# Patient Record
Sex: MALE | Race: BLACK OR AFRICAN AMERICAN | NOT HISPANIC OR LATINO
[De-identification: names, ages, dates, MRNs, and addresses within clinical notes are randomized per-mention and may not be internally consistent; named-entity substitution may affect disease eponyms.]

---

## 2017-01-03 ENCOUNTER — RX RENEWAL (OUTPATIENT)
Age: 77
End: 2017-01-03

## 2017-01-19 ENCOUNTER — APPOINTMENT (OUTPATIENT)
Dept: UROLOGY | Facility: CLINIC | Age: 77
End: 2017-01-19

## 2017-01-19 DIAGNOSIS — N28.1 CYST OF KIDNEY, ACQUIRED: ICD-10-CM

## 2017-01-19 LAB
APPEARANCE: CLEAR
BACTERIA: NEGATIVE
BILIRUBIN URINE: NEGATIVE
BLOOD URINE: NEGATIVE
COLOR: YELLOW
GLUCOSE QUALITATIVE U: NORMAL MG/DL
HYALINE CASTS: 2 /LPF
KETONES URINE: NEGATIVE
LEUKOCYTE ESTERASE URINE: NEGATIVE
MICROSCOPIC-UA: NORMAL
NITRITE URINE: NEGATIVE
PH URINE: 5.5
PROTEIN URINE: 100 MG/DL
RED BLOOD CELLS URINE: 0 /HPF
SPECIFIC GRAVITY URINE: 1.01
SQUAMOUS EPITHELIAL CELLS: 0 /HPF
UROBILINOGEN URINE: NORMAL MG/DL
WHITE BLOOD CELLS URINE: 0 /HPF

## 2017-01-20 LAB
PSA FREE FLD-MCNC: 53.4 %
PSA FREE SERPL-MCNC: 2.54 NG/ML
PSA SERPL-MCNC: 4.76 NG/ML

## 2017-01-24 LAB — CORE LAB FLUID CYTOLOGY: NORMAL

## 2017-02-03 ENCOUNTER — APPOINTMENT (OUTPATIENT)
Dept: NEPHROLOGY | Facility: CLINIC | Age: 77
End: 2017-02-03

## 2017-02-03 ENCOUNTER — MEDICATION RENEWAL (OUTPATIENT)
Age: 77
End: 2017-02-03

## 2017-02-03 VITALS
BODY MASS INDEX: 25.92 KG/M2 | SYSTOLIC BLOOD PRESSURE: 161 MMHG | OXYGEN SATURATION: 98 % | HEIGHT: 69 IN | DIASTOLIC BLOOD PRESSURE: 84 MMHG | WEIGHT: 175 LBS | HEART RATE: 66 BPM

## 2017-02-03 VITALS — DIASTOLIC BLOOD PRESSURE: 80 MMHG | SYSTOLIC BLOOD PRESSURE: 150 MMHG | HEART RATE: 64 BPM

## 2017-03-03 ENCOUNTER — MEDICATION RENEWAL (OUTPATIENT)
Age: 77
End: 2017-03-03

## 2017-03-06 ENCOUNTER — MEDICATION RENEWAL (OUTPATIENT)
Age: 77
End: 2017-03-06

## 2017-03-21 ENCOUNTER — APPOINTMENT (OUTPATIENT)
Dept: INTERNAL MEDICINE | Facility: CLINIC | Age: 77
End: 2017-03-21

## 2017-03-21 ENCOUNTER — NON-APPOINTMENT (OUTPATIENT)
Age: 77
End: 2017-03-21

## 2017-03-21 ENCOUNTER — LABORATORY RESULT (OUTPATIENT)
Age: 77
End: 2017-03-21

## 2017-03-21 VITALS
BODY MASS INDEX: 26.07 KG/M2 | DIASTOLIC BLOOD PRESSURE: 74 MMHG | RESPIRATION RATE: 16 BRPM | OXYGEN SATURATION: 98 % | HEART RATE: 60 BPM | TEMPERATURE: 98 F | WEIGHT: 176 LBS | SYSTOLIC BLOOD PRESSURE: 130 MMHG | HEIGHT: 69 IN

## 2017-03-21 DIAGNOSIS — Z23 ENCOUNTER FOR IMMUNIZATION: ICD-10-CM

## 2017-03-21 DIAGNOSIS — R97.20 ELEVATED PROSTATE, SPECIFIC ANTIGEN [PSA]: ICD-10-CM

## 2017-03-24 LAB
25(OH)D3 SERPL-MCNC: 41 NG/ML
AFP-TM SERPL-MCNC: 1.9 NG/ML
ALBUMIN MFR SERPL ELPH: 56 %
ALBUMIN SERPL ELPH-MCNC: 4.6 G/DL
ALBUMIN SERPL-MCNC: 4.6 G/DL
ALBUMIN/GLOB SERPL: 1.2 RATIO
ALP BLD-CCNC: 50 U/L
ALPHA1 GLOB MFR SERPL ELPH: 3.4 %
ALPHA1 GLOB SERPL ELPH-MCNC: 0.3 G/DL
ALPHA2 GLOB MFR SERPL ELPH: 13.3 %
ALPHA2 GLOB SERPL ELPH-MCNC: 1.1 G/DL
ALT SERPL-CCNC: 18 U/L
ANION GAP SERPL CALC-SCNC: 13 MMOL/L
APPEARANCE: CLEAR
AST SERPL-CCNC: 25 U/L
B-GLOBULIN MFR SERPL ELPH: 10.6 %
B-GLOBULIN SERPL ELPH-MCNC: 0.9 G/DL
BACTERIA: NEGATIVE
BASOPHILS # BLD AUTO: 0.02 K/UL
BASOPHILS NFR BLD AUTO: 0.3 %
BILIRUB SERPL-MCNC: 0.5 MG/DL
BILIRUBIN URINE: NEGATIVE
BLOOD URINE: NEGATIVE
BUN SERPL-MCNC: 27 MG/DL
CALCIUM SERPL-MCNC: 10.1 MG/DL
CALCIUM SERPL-MCNC: 10.1 MG/DL
CHLORIDE SERPL-SCNC: 102 MMOL/L
CHOLEST SERPL-MCNC: 136 MG/DL
CHOLEST/HDLC SERPL: 2.6 RATIO
CO2 SERPL-SCNC: 25 MMOL/L
COLOR: YELLOW
CREAT SERPL-MCNC: 1.74 MG/DL
CREAT SPEC-SCNC: 144 MG/DL
EOSINOPHIL # BLD AUTO: 0.09 K/UL
EOSINOPHIL NFR BLD AUTO: 1.4 %
GAMMA GLOB FLD ELPH-MCNC: 1.4 G/DL
GAMMA GLOB MFR SERPL ELPH: 16.7 %
GLUCOSE QUALITATIVE U: NORMAL MG/DL
GLUCOSE SERPL-MCNC: 112 MG/DL
HBA1C MFR BLD HPLC: 6.3 %
HCT VFR BLD CALC: 44.8 %
HDLC SERPL-MCNC: 53 MG/DL
HGB BLD-MCNC: 14 G/DL
HYALINE CASTS: 2 /LPF
IMM GRANULOCYTES NFR BLD AUTO: 0.2 %
INTERPRETATION SERPL IEP-IMP: NORMAL
IRON SERPL-MCNC: 87 UG/DL
KETONES URINE: NEGATIVE
LDLC SERPL CALC-MCNC: 66 MG/DL
LEUKOCYTE ESTERASE URINE: NEGATIVE
LYMPHOCYTES # BLD AUTO: 1.4 K/UL
LYMPHOCYTES NFR BLD AUTO: 21.8 %
M PROTEIN MFR SERPL ELPH: 4.6 %
MAN DIFF?: NORMAL
MCHC RBC-ENTMCNC: 28.8 PG
MCHC RBC-ENTMCNC: 31.3 GM/DL
MCV RBC AUTO: 92.2 FL
MICROALBUMIN 24H UR DL<=1MG/L-MCNC: 196.3 MG/DL
MICROALBUMIN/CREAT 24H UR-RTO: 1366 UG/MG
MICROSCOPIC-UA: NORMAL
MONOCLON BAND OBS SERPL: 0.4 G/DL
MONOCYTES # BLD AUTO: 0.34 K/UL
MONOCYTES NFR BLD AUTO: 5.3 %
NEUTROPHILS # BLD AUTO: 4.57 K/UL
NEUTROPHILS NFR BLD AUTO: 71 %
NITRITE URINE: NEGATIVE
PARATHYROID HORMONE INTACT: 28 PG/ML
PH URINE: 5.5
PLATELET # BLD AUTO: 154 K/UL
POTASSIUM SERPL-SCNC: 5.1 MMOL/L
PROT SERPL-MCNC: 8.3 G/DL
PROTEIN URINE: 300 MG/DL
PSA SERPL-MCNC: 4.91 NG/ML
RBC # BLD: 4.86 M/UL
RBC # FLD: 14.6 %
RED BLOOD CELLS URINE: 1 /HPF
SODIUM SERPL-SCNC: 140 MMOL/L
SPECIFIC GRAVITY URINE: 1.02
SQUAMOUS EPITHELIAL CELLS: 0 /HPF
TRIGL SERPL-MCNC: 87 MG/DL
TSH SERPL-ACNC: 2.39 UIU/ML
URATE SERPL-MCNC: 8.2 MG/DL
UROBILINOGEN URINE: NORMAL MG/DL
WBC # FLD AUTO: 6.43 K/UL
WHITE BLOOD CELLS URINE: 1 /HPF

## 2017-03-30 ENCOUNTER — OUTPATIENT (OUTPATIENT)
Dept: OUTPATIENT SERVICES | Facility: HOSPITAL | Age: 77
LOS: 1 days | End: 2017-03-30
Payer: MEDICARE

## 2017-03-30 ENCOUNTER — APPOINTMENT (OUTPATIENT)
Dept: CT IMAGING | Facility: IMAGING CENTER | Age: 77
End: 2017-03-30

## 2017-03-30 ENCOUNTER — MEDICATION RENEWAL (OUTPATIENT)
Age: 77
End: 2017-03-30

## 2017-03-30 DIAGNOSIS — N40.1 BENIGN PROSTATIC HYPERPLASIA WITH LOWER URINARY TRACT SYMPTOMS: ICD-10-CM

## 2017-03-30 DIAGNOSIS — Z98.89 OTHER SPECIFIED POSTPROCEDURAL STATES: Chronic | ICD-10-CM

## 2017-03-30 PROCEDURE — 74176 CT ABD & PELVIS W/O CONTRAST: CPT

## 2017-04-08 LAB — HEMOCCULT STL QL IA: NEGATIVE

## 2017-05-23 ENCOUNTER — MEDICATION RENEWAL (OUTPATIENT)
Age: 77
End: 2017-05-23

## 2017-07-20 ENCOUNTER — MEDICATION RENEWAL (OUTPATIENT)
Age: 77
End: 2017-07-20

## 2017-07-24 ENCOUNTER — LABORATORY RESULT (OUTPATIENT)
Age: 77
End: 2017-07-24

## 2017-07-25 ENCOUNTER — APPOINTMENT (OUTPATIENT)
Dept: INTERNAL MEDICINE | Facility: CLINIC | Age: 77
End: 2017-07-25

## 2017-07-25 VITALS
OXYGEN SATURATION: 98 % | HEIGHT: 69 IN | WEIGHT: 175 LBS | SYSTOLIC BLOOD PRESSURE: 120 MMHG | DIASTOLIC BLOOD PRESSURE: 70 MMHG | TEMPERATURE: 98.1 F | BODY MASS INDEX: 25.92 KG/M2 | HEART RATE: 57 BPM

## 2017-07-28 LAB
25(OH)D3 SERPL-MCNC: 53.2 NG/ML
ALBUMIN MFR SERPL ELPH: 56.7 %
ALBUMIN SERPL ELPH-MCNC: 4.7 G/DL
ALBUMIN SERPL-MCNC: 4.6 G/DL
ALBUMIN/GLOB SERPL: 1.3 RATIO
ALP BLD-CCNC: 53 U/L
ALPHA1 GLOB MFR SERPL ELPH: 3.3 %
ALPHA1 GLOB SERPL ELPH-MCNC: 0.3 G/DL
ALPHA2 GLOB MFR SERPL ELPH: 12.3 %
ALPHA2 GLOB SERPL ELPH-MCNC: 1 G/DL
ALT SERPL-CCNC: 14 U/L
ANION GAP SERPL CALC-SCNC: 16 MMOL/L
APPEARANCE: CLEAR
AST SERPL-CCNC: 22 U/L
B-GLOBULIN MFR SERPL ELPH: 10.4 %
B-GLOBULIN SERPL ELPH-MCNC: 0.9 G/DL
BACTERIA: NEGATIVE
BASOPHILS # BLD AUTO: 0.03 K/UL
BASOPHILS NFR BLD AUTO: 0.6 %
BILIRUB SERPL-MCNC: 0.4 MG/DL
BILIRUBIN URINE: NEGATIVE
BLOOD URINE: NEGATIVE
BUN SERPL-MCNC: 24 MG/DL
CALCIUM SERPL-MCNC: 10.7 MG/DL
CHLORIDE SERPL-SCNC: 103 MMOL/L
CHOLEST SERPL-MCNC: 143 MG/DL
CHOLEST/HDLC SERPL: 3 RATIO
CO2 SERPL-SCNC: 23 MMOL/L
COLOR: YELLOW
CREAT SERPL-MCNC: 1.87 MG/DL
CREAT SPEC-SCNC: 137 MG/DL
EOSINOPHIL # BLD AUTO: 0.09 K/UL
EOSINOPHIL NFR BLD AUTO: 1.8 %
GAMMA GLOB FLD ELPH-MCNC: 1.4 G/DL
GAMMA GLOB MFR SERPL ELPH: 17.3 %
GLUCOSE QUALITATIVE U: NORMAL MG/DL
GLUCOSE SERPL-MCNC: 108 MG/DL
HBA1C MFR BLD HPLC: 6.2 %
HCT VFR BLD CALC: 44.8 %
HDLC SERPL-MCNC: 48 MG/DL
HGB BLD-MCNC: 13.8 G/DL
HYALINE CASTS: 0 /LPF
IMM GRANULOCYTES NFR BLD AUTO: 0.2 %
INTERPRETATION SERPL IEP-IMP: NORMAL
KETONES URINE: NEGATIVE
LDLC SERPL CALC-MCNC: 78 MG/DL
LEUKOCYTE ESTERASE URINE: NEGATIVE
LYMPHOCYTES # BLD AUTO: 0.99 K/UL
LYMPHOCYTES NFR BLD AUTO: 20.2 %
M PROTEIN MFR SERPL ELPH: 4.1 %
MAN DIFF?: NORMAL
MCHC RBC-ENTMCNC: 28 PG
MCHC RBC-ENTMCNC: 30.8 GM/DL
MCV RBC AUTO: 91.1 FL
MICROALBUMIN 24H UR DL<=1MG/L-MCNC: 220.8 MG/DL
MICROALBUMIN/CREAT 24H UR-RTO: 1612 MG/G
MICROSCOPIC-UA: NORMAL
MONOCLON BAND OBS SERPL: 0.3 G/DL
MONOCYTES # BLD AUTO: 0.4 K/UL
MONOCYTES NFR BLD AUTO: 8.2 %
NEUTROPHILS # BLD AUTO: 3.38 K/UL
NEUTROPHILS NFR BLD AUTO: 69 %
NITRITE URINE: NEGATIVE
PH URINE: 5.5
PLATELET # BLD AUTO: 159 K/UL
POTASSIUM SERPL-SCNC: 4.4 MMOL/L
PROT SERPL-MCNC: 8.2 G/DL
PROTEIN URINE: 300 MG/DL
RBC # BLD: 4.92 M/UL
RBC # FLD: 15 %
RED BLOOD CELLS URINE: 2 /HPF
SODIUM SERPL-SCNC: 142 MMOL/L
SPECIFIC GRAVITY URINE: 1.02
SQUAMOUS EPITHELIAL CELLS: 0 /HPF
TRIGL SERPL-MCNC: 85 MG/DL
TSH SERPL-ACNC: 2.33 UIU/ML
URATE SERPL-MCNC: 7.6 MG/DL
UROBILINOGEN URINE: NORMAL MG/DL
WBC # FLD AUTO: 4.9 K/UL
WHITE BLOOD CELLS URINE: 1 /HPF

## 2017-07-31 ENCOUNTER — MEDICATION RENEWAL (OUTPATIENT)
Age: 77
End: 2017-07-31

## 2017-07-31 ENCOUNTER — APPOINTMENT (OUTPATIENT)
Dept: CARDIOLOGY | Facility: CLINIC | Age: 77
End: 2017-07-31
Payer: MEDICARE

## 2017-07-31 ENCOUNTER — NON-APPOINTMENT (OUTPATIENT)
Age: 77
End: 2017-07-31

## 2017-07-31 VITALS
BODY MASS INDEX: 25.62 KG/M2 | DIASTOLIC BLOOD PRESSURE: 80 MMHG | WEIGHT: 173 LBS | SYSTOLIC BLOOD PRESSURE: 136 MMHG | HEART RATE: 70 BPM | HEIGHT: 69 IN | OXYGEN SATURATION: 96 %

## 2017-07-31 PROCEDURE — 93000 ELECTROCARDIOGRAM COMPLETE: CPT | Mod: 59

## 2017-07-31 PROCEDURE — 99214 OFFICE O/P EST MOD 30 MIN: CPT

## 2017-07-31 PROCEDURE — 93224 XTRNL ECG REC UP TO 48 HRS: CPT

## 2017-08-02 ENCOUNTER — NON-APPOINTMENT (OUTPATIENT)
Age: 77
End: 2017-08-02

## 2017-08-08 ENCOUNTER — NON-APPOINTMENT (OUTPATIENT)
Age: 77
End: 2017-08-08

## 2017-08-08 ENCOUNTER — MEDICATION RENEWAL (OUTPATIENT)
Age: 77
End: 2017-08-08

## 2017-08-10 ENCOUNTER — APPOINTMENT (OUTPATIENT)
Dept: NEPHROLOGY | Facility: CLINIC | Age: 77
End: 2017-08-10
Payer: MEDICARE

## 2017-08-10 VITALS
DIASTOLIC BLOOD PRESSURE: 74 MMHG | HEART RATE: 60 BPM | OXYGEN SATURATION: 95 % | SYSTOLIC BLOOD PRESSURE: 128 MMHG | HEIGHT: 69 IN | WEIGHT: 175 LBS | BODY MASS INDEX: 25.92 KG/M2

## 2017-08-10 PROCEDURE — 99214 OFFICE O/P EST MOD 30 MIN: CPT

## 2017-08-11 ENCOUNTER — APPOINTMENT (OUTPATIENT)
Dept: CARDIOLOGY | Facility: CLINIC | Age: 77
End: 2017-08-11
Payer: MEDICARE

## 2017-08-11 PROCEDURE — 93306 TTE W/DOPPLER COMPLETE: CPT

## 2017-09-26 ENCOUNTER — MEDICATION RENEWAL (OUTPATIENT)
Age: 77
End: 2017-09-26

## 2017-11-08 ENCOUNTER — MEDICATION RENEWAL (OUTPATIENT)
Age: 77
End: 2017-11-08

## 2017-11-08 ENCOUNTER — APPOINTMENT (OUTPATIENT)
Dept: INTERNAL MEDICINE | Facility: CLINIC | Age: 77
End: 2017-11-08
Payer: MEDICARE

## 2017-11-08 VITALS
HEIGHT: 69 IN | DIASTOLIC BLOOD PRESSURE: 70 MMHG | SYSTOLIC BLOOD PRESSURE: 130 MMHG | WEIGHT: 177 LBS | HEART RATE: 61 BPM | BODY MASS INDEX: 26.22 KG/M2 | OXYGEN SATURATION: 98 % | TEMPERATURE: 98.2 F

## 2017-11-08 DIAGNOSIS — Z23 ENCOUNTER FOR IMMUNIZATION: ICD-10-CM

## 2017-11-08 PROCEDURE — G0008: CPT

## 2017-11-08 PROCEDURE — 90662 IIV NO PRSV INCREASED AG IM: CPT

## 2017-11-08 PROCEDURE — 36415 COLL VENOUS BLD VENIPUNCTURE: CPT

## 2017-11-08 PROCEDURE — 99214 OFFICE O/P EST MOD 30 MIN: CPT | Mod: 25

## 2017-11-10 LAB
25(OH)D3 SERPL-MCNC: 35.6 NG/ML
ALBUMIN SERPL ELPH-MCNC: 4.7 G/DL
ALP BLD-CCNC: 52 U/L
ALT SERPL-CCNC: 15 U/L
ANION GAP SERPL CALC-SCNC: 12 MMOL/L
APPEARANCE: CLEAR
AST SERPL-CCNC: 28 U/L
BACTERIA: NEGATIVE
BASOPHILS # BLD AUTO: 0.04 K/UL
BASOPHILS NFR BLD AUTO: 0.8 %
BILIRUB SERPL-MCNC: 0.3 MG/DL
BILIRUBIN URINE: NEGATIVE
BLOOD URINE: NEGATIVE
BUN SERPL-MCNC: 27 MG/DL
CALCIUM SERPL-MCNC: 10.2 MG/DL
CALCIUM SERPL-MCNC: 10.2 MG/DL
CHLORIDE SERPL-SCNC: 103 MMOL/L
CHOLEST SERPL-MCNC: 127 MG/DL
CHOLEST/HDLC SERPL: 2.8 RATIO
CO2 SERPL-SCNC: 25 MMOL/L
COLOR: YELLOW
CREAT SERPL-MCNC: 1.97 MG/DL
CREAT SPEC-SCNC: 160 MG/DL
EOSINOPHIL # BLD AUTO: 0.1 K/UL
EOSINOPHIL NFR BLD AUTO: 2 %
GLUCOSE QUALITATIVE U: NEGATIVE MG/DL
GLUCOSE SERPL-MCNC: 84 MG/DL
HBA1C MFR BLD HPLC: 6.1 %
HCT VFR BLD CALC: 42.2 %
HDLC SERPL-MCNC: 45 MG/DL
HGB BLD-MCNC: 13.3 G/DL
HYALINE CASTS: 1 /LPF
IMM GRANULOCYTES NFR BLD AUTO: 0.2 %
IRON SERPL-MCNC: 93 UG/DL
KETONES URINE: NEGATIVE
LDLC SERPL CALC-MCNC: 64 MG/DL
LEUKOCYTE ESTERASE URINE: NEGATIVE
LYMPHOCYTES # BLD AUTO: 1.29 K/UL
LYMPHOCYTES NFR BLD AUTO: 26.4 %
MAN DIFF?: NORMAL
MCHC RBC-ENTMCNC: 28.7 PG
MCHC RBC-ENTMCNC: 31.5 GM/DL
MCV RBC AUTO: 90.9 FL
MICROALBUMIN 24H UR DL<=1MG/L-MCNC: 218.2 MG/DL
MICROALBUMIN/CREAT 24H UR-RTO: 1364 MG/G
MICROSCOPIC-UA: NORMAL
MONOCYTES # BLD AUTO: 0.41 K/UL
MONOCYTES NFR BLD AUTO: 8.4 %
NEUTROPHILS # BLD AUTO: 3.03 K/UL
NEUTROPHILS NFR BLD AUTO: 62.2 %
NITRITE URINE: NEGATIVE
PARATHYROID HORMONE INTACT: 43 PG/ML
PH URINE: 5.5
PLATELET # BLD AUTO: 151 K/UL
POTASSIUM SERPL-SCNC: 4.5 MMOL/L
PROT SERPL-MCNC: 8.1 G/DL
PROTEIN URINE: 300 MG/DL
RBC # BLD: 4.64 M/UL
RBC # FLD: 14.9 %
RED BLOOD CELLS URINE: 1 /HPF
SODIUM SERPL-SCNC: 140 MMOL/L
SPECIFIC GRAVITY URINE: 1.02
SQUAMOUS EPITHELIAL CELLS: 1 /HPF
TRIGL SERPL-MCNC: 91 MG/DL
TSH SERPL-ACNC: 2.5 UIU/ML
UROBILINOGEN URINE: NEGATIVE MG/DL
WBC # FLD AUTO: 4.88 K/UL
WHITE BLOOD CELLS URINE: 0 /HPF

## 2018-01-16 ENCOUNTER — MEDICATION RENEWAL (OUTPATIENT)
Age: 78
End: 2018-01-16

## 2018-02-02 ENCOUNTER — APPOINTMENT (OUTPATIENT)
Dept: NEPHROLOGY | Facility: CLINIC | Age: 78
End: 2018-02-02
Payer: MEDICARE

## 2018-02-02 VITALS
HEART RATE: 70 BPM | DIASTOLIC BLOOD PRESSURE: 84 MMHG | BODY MASS INDEX: 28.52 KG/M2 | OXYGEN SATURATION: 98 % | SYSTOLIC BLOOD PRESSURE: 165 MMHG | WEIGHT: 177.47 LBS | HEIGHT: 66 IN

## 2018-02-02 PROCEDURE — 99214 OFFICE O/P EST MOD 30 MIN: CPT

## 2018-02-02 RX ORDER — BIMATOPROST 0.1 MG/ML
0.01 SOLUTION/ DROPS OPHTHALMIC
Qty: 5 | Refills: 0 | Status: DISCONTINUED | COMMUNITY
Start: 2017-12-06

## 2018-02-02 RX ORDER — AMOXICILLIN 875 MG/1
875 TABLET, FILM COATED ORAL
Qty: 14 | Refills: 0 | Status: DISCONTINUED | COMMUNITY
Start: 2017-09-25

## 2018-02-21 DIAGNOSIS — Z20.828 CONTACT WITH AND (SUSPECTED) EXPOSURE TO OTHER VIRAL COMMUNICABLE DISEASES: ICD-10-CM

## 2018-03-12 ENCOUNTER — APPOINTMENT (OUTPATIENT)
Dept: INTERNAL MEDICINE | Facility: CLINIC | Age: 78
End: 2018-03-12
Payer: MEDICARE

## 2018-03-12 VITALS
BODY MASS INDEX: 27.76 KG/M2 | DIASTOLIC BLOOD PRESSURE: 84 MMHG | SYSTOLIC BLOOD PRESSURE: 150 MMHG | HEART RATE: 66 BPM | WEIGHT: 172 LBS | TEMPERATURE: 98 F | OXYGEN SATURATION: 99 %

## 2018-03-12 PROCEDURE — 36415 COLL VENOUS BLD VENIPUNCTURE: CPT

## 2018-03-12 PROCEDURE — 99214 OFFICE O/P EST MOD 30 MIN: CPT | Mod: 25

## 2018-03-12 RX ORDER — OSELTAMIVIR PHOSPHATE 75 MG/1
75 CAPSULE ORAL
Qty: 10 | Refills: 0 | Status: DISCONTINUED | COMMUNITY
Start: 2018-02-21 | End: 2018-03-12

## 2018-03-13 LAB
25(OH)D3 SERPL-MCNC: 34.8 NG/ML
ALBUMIN SERPL ELPH-MCNC: 4.5 G/DL
ALP BLD-CCNC: 48 U/L
ALT SERPL-CCNC: 16 U/L
ANION GAP SERPL CALC-SCNC: 15 MMOL/L
APPEARANCE: CLEAR
AST SERPL-CCNC: 28 U/L
BACTERIA: NEGATIVE
BASOPHILS # BLD AUTO: 0.05 K/UL
BASOPHILS NFR BLD AUTO: 0.9 %
BILIRUB SERPL-MCNC: 0.5 MG/DL
BILIRUBIN URINE: NEGATIVE
BLOOD URINE: NEGATIVE
BUN SERPL-MCNC: 25 MG/DL
CALCIUM SERPL-MCNC: 10.1 MG/DL
CHLORIDE SERPL-SCNC: 101 MMOL/L
CHOLEST SERPL-MCNC: 148 MG/DL
CHOLEST/HDLC SERPL: 3.3 RATIO
CO2 SERPL-SCNC: 24 MMOL/L
COLOR: YELLOW
CREAT SERPL-MCNC: 1.81 MG/DL
CREAT SPEC-SCNC: 23 MG/DL
EOSINOPHIL # BLD AUTO: 0.12 K/UL
EOSINOPHIL NFR BLD AUTO: 2.1 %
GLUCOSE QUALITATIVE U: NEGATIVE MG/DL
GLUCOSE SERPL-MCNC: 108 MG/DL
HBA1C MFR BLD HPLC: 6.2 %
HCT VFR BLD CALC: 44.2 %
HDLC SERPL-MCNC: 45 MG/DL
HGB BLD-MCNC: 13.7 G/DL
HYALINE CASTS: 2 /LPF
IMM GRANULOCYTES NFR BLD AUTO: 0.2 %
KETONES URINE: NEGATIVE
LDLC SERPL CALC-MCNC: 81 MG/DL
LEUKOCYTE ESTERASE URINE: NEGATIVE
LYMPHOCYTES # BLD AUTO: 1.56 K/UL
LYMPHOCYTES NFR BLD AUTO: 27.7 %
MAN DIFF?: NORMAL
MCHC RBC-ENTMCNC: 28.5 PG
MCHC RBC-ENTMCNC: 31 GM/DL
MCV RBC AUTO: 91.9 FL
MICROALBUMIN 24H UR DL<=1MG/L-MCNC: 64.3 MG/DL
MICROALBUMIN/CREAT 24H UR-RTO: 2796 MG/G
MICROSCOPIC-UA: NORMAL
MONOCYTES # BLD AUTO: 0.4 K/UL
MONOCYTES NFR BLD AUTO: 7.1 %
NEUTROPHILS # BLD AUTO: 3.49 K/UL
NEUTROPHILS NFR BLD AUTO: 62 %
NITRITE URINE: NEGATIVE
PH URINE: 5.5
PLATELET # BLD AUTO: 159 K/UL
POTASSIUM SERPL-SCNC: 4.5 MMOL/L
PROT SERPL-MCNC: 8.4 G/DL
PROTEIN URINE: 100 MG/DL
RBC # BLD: 4.81 M/UL
RBC # FLD: 15.1 %
RED BLOOD CELLS URINE: 1 /HPF
SODIUM SERPL-SCNC: 140 MMOL/L
SPECIFIC GRAVITY URINE: 1.01
SQUAMOUS EPITHELIAL CELLS: 0 /HPF
TRIGL SERPL-MCNC: 110 MG/DL
TSH SERPL-ACNC: 2.99 UIU/ML
UROBILINOGEN URINE: NEGATIVE MG/DL
WBC # FLD AUTO: 5.63 K/UL
WHITE BLOOD CELLS URINE: 0 /HPF

## 2018-03-15 ENCOUNTER — MEDICATION RENEWAL (OUTPATIENT)
Age: 78
End: 2018-03-15

## 2018-04-06 ENCOUNTER — MEDICATION RENEWAL (OUTPATIENT)
Age: 78
End: 2018-04-06

## 2018-05-09 ENCOUNTER — RX RENEWAL (OUTPATIENT)
Age: 78
End: 2018-05-09

## 2018-05-21 ENCOUNTER — RX RENEWAL (OUTPATIENT)
Age: 78
End: 2018-05-21

## 2018-07-05 ENCOUNTER — RX RENEWAL (OUTPATIENT)
Age: 78
End: 2018-07-05

## 2018-07-09 ENCOUNTER — NON-APPOINTMENT (OUTPATIENT)
Age: 78
End: 2018-07-09

## 2018-07-09 ENCOUNTER — APPOINTMENT (OUTPATIENT)
Dept: INTERNAL MEDICINE | Facility: CLINIC | Age: 78
End: 2018-07-09
Payer: MEDICARE

## 2018-07-09 VITALS
SYSTOLIC BLOOD PRESSURE: 130 MMHG | HEART RATE: 58 BPM | BODY MASS INDEX: 25.17 KG/M2 | TEMPERATURE: 98.4 F | HEIGHT: 68.5 IN | OXYGEN SATURATION: 98 % | DIASTOLIC BLOOD PRESSURE: 70 MMHG | WEIGHT: 168 LBS

## 2018-07-09 PROCEDURE — G0439: CPT

## 2018-07-09 PROCEDURE — 36415 COLL VENOUS BLD VENIPUNCTURE: CPT

## 2018-07-09 PROCEDURE — 93000 ELECTROCARDIOGRAM COMPLETE: CPT | Mod: 59

## 2018-07-09 PROCEDURE — G0444 DEPRESSION SCREEN ANNUAL: CPT | Mod: 59

## 2018-07-09 NOTE — HISTORY OF PRESENT ILLNESS
[FreeTextEntry1] : Comes in for annual physical. [de-identified] : Comes in for annual physical.\par Feeling well with no complaints.\par Busy gardening and caring for his 2 dogs.

## 2018-07-09 NOTE — ASSESSMENT
[FreeTextEntry1] : See health maintenance assessment and plan outlined below.\par In addition:\par Full labs and urine sent today\par Podiatry consult for diabetic foot care\par Had colonoscopy 1/31/2011 - due in 10 years\par Wishes to do FIT testing - states that he will not do colonoscopy again\par GI f/u \par AFP sent\par Liver US\par Renal f/u\par Urology f/u\par Hematology f/u\par Continue meds, diet, exercise as outlined\par EKG done and reviewed with patient and report scanned\par Cardiology f/u - to do f/u echo there\par CXR declined\par Vaccines d/w patient \par He declined Endocrine evaluation\par To see derm, ophtho, dentist, ENT\par RTC for annual physical \par RTC 3-4 months and as needed\par To call for any medical issues\par

## 2018-07-09 NOTE — COUNSELING
[Weight management counseling provided] : Weight management [Healthy eating counseling provided] : healthy eating [Activity counseling provided] : activity [Low Fat Diet] : Low fat diet [Low Salt Diet] : Low salt diet [None] : None [Good understanding] : Patient has a good understanding of lifestyle changes and the steps needed to achieve self management goals [ - Annual Depression Screening] : Annual Depression Screening

## 2018-07-09 NOTE — HEALTH RISK ASSESSMENT
[Very Good] : ~his/her~  mood as very good [No falls in past year] : Patient reported no falls in the past year [0] : 2) Feeling down, depressed, or hopeless: Not at all (0) [Patient reported colonoscopy was normal] : Patient reported colonoscopy was normal [HIV Test offered] : HIV Test offered [Hepatitis C test offered] : Hepatitis C test offered [None] : None [With Family] : lives with family [# of Members in Household ___] :  household currently consist of [unfilled] member(s) [Retired] : retired [College] : College [] :  [# Of Children ___] : has [unfilled] children [Feels Safe at Home] : Feels safe at home [Fully functional (bathing, dressing, toileting, transferring, walking, feeding)] : Fully functional (bathing, dressing, toileting, transferring, walking, feeding) [Fully functional (using the telephone, shopping, preparing meals, housekeeping, doing laundry, using] : Fully functional and needs no help or supervision to perform IADLs (using the telephone, shopping, preparing meals, housekeeping, doing laundry, using transportation, managing medications and managing finances) [Smoke Detector] : smoke detector [Carbon Monoxide Detector] : carbon monoxide detector [Guns at Home] : guns at home [Seat Belt] :  uses seat belt [Discussed at today's visit] : Advance Directives Discussed at today's visit [DNR] : DNR [FreeTextEntry1] : none [] : No [de-identified] : none [de-identified] : podiatrist; ophtho; dentist; renal; cardiology [[id number]] : 0 [Change in mental status noted] : No change in mental status noted [Language] : denies difficulty with language [Behavior] : denies difficulty with behavior [Learning/Retaining New Information] : denies difficulty learning/retaining new information [Handling Complex Tasks] : denies difficulty handling complex tasks [Reasoning] : denies difficulty with reasoning [Spatial Ability and Orientation] : denies difficulty with spatial ability and orientation [Sexually Active] : not sexually active [Reports changes in hearing] : Reports no changes in hearing [Reports changes in vision] : Reports no changes in vision [Reports changes in dental health] : Reports no changes in dental health [Sunscreen] : does not use sunscreen [Travel to Developing Areas] : does not  travel to developing areas [TB Exposure] : is not being exposed to tuberculosis [Caregiver Concerns] : does not have caregiver concerns [MammogramDate] : NA [PapSmearDate] : NA [BoneDensityDate] : NA [ColonoscopyDate] : 1/2011 [FreeTextEntry4] : Form given to complete

## 2018-07-09 NOTE — PHYSICAL EXAM
[No Acute Distress] : no acute distress [Well Nourished] : well nourished [Well Developed] : well developed [Well-Appearing] : well-appearing [Normal Voice/Communication] : normal voice/communication [Normal Sclera/Conjunctiva] : normal sclera/conjunctiva [PERRL] : pupils equal round and reactive to light [EOMI] : extraocular movements intact [Normal Outer Ear/Nose] : the outer ears and nose were normal in appearance [Normal Oropharynx] : the oropharynx was normal [No JVD] : no jugular venous distention [Supple] : supple [No Respiratory Distress] : no respiratory distress  [Clear to Auscultation] : lungs were clear to auscultation bilaterally [No Accessory Muscle Use] : no accessory muscle use [Normal Rate] : normal rate  [Regular Rhythm] : with a regular rhythm [Normal S1, S2] : normal S1 and S2 [No Carotid Bruits] : no carotid bruits [No Edema] : there was no peripheral edema [No Extremity Clubbing/Cyanosis] : no extremity clubbing/cyanosis [Soft] : abdomen soft [Non Tender] : non-tender [Normal Bowel Sounds] : normal bowel sounds [No CVA Tenderness] : no CVA  tenderness [No Spinal Tenderness] : no spinal tenderness [Grossly Normal Strength/Tone] : grossly normal strength/tone [No Rash] : no rash [Normal Gait] : normal gait [Coordination Grossly Intact] : coordination grossly intact [No Focal Deficits] : no focal deficits [Speech Grossly Normal] : speech grossly normal [Normal Affect] : the affect was normal [Alert and Oriented x3] : oriented to person, place, and time [Normal TMs] : both tympanic membranes were normal [No Lymphadenopathy] : no lymphadenopathy [Thyroid Normal, No Nodules] : the thyroid was normal and there were no nodules present [Pedal Pulses Present] : the pedal pulses are present [Normal Appearance] : normal in appearance [No Nipple Discharge] : no nipple discharge [No Axillary Lymphadenopathy] : no axillary lymphadenopathy [Non-distended] : non-distended [No Masses] : no abdominal mass palpated [No HSM] : no HSM [Normal Supraclavicular Nodes] : no supraclavicular lymphadenopathy [Normal Axillary Nodes] : no axillary lymphadenopathy [Normal Posterior Cervical Nodes] : no posterior cervical lymphadenopathy [Normal Anterior Cervical Nodes] : no anterior cervical lymphadenopathy [Scoliosis] : scoliosis [Deep Tendon Reflexes (DTR)] : deep tendon reflexes were 2+ and symmetric [de-identified] : no ST [de-identified] : no stridor [de-identified] : R=16 [de-identified] : murmur unchanged [de-identified] : no cords; normal radial pulses [de-identified] : sees podiatry

## 2018-07-11 ENCOUNTER — RX RENEWAL (OUTPATIENT)
Age: 78
End: 2018-07-11

## 2018-07-13 LAB
25(OH)D3 SERPL-MCNC: 38.6 NG/ML
AFP-TM SERPL-MCNC: 1.9 NG/ML
ALBUMIN MFR SERPL ELPH: 57.4 %
ALBUMIN SERPL ELPH-MCNC: 4.9 G/DL
ALBUMIN SERPL-MCNC: 4.9 G/DL
ALBUMIN/GLOB SERPL: 1.3 RATIO
ALP BLD-CCNC: 53 U/L
ALPHA1 GLOB MFR SERPL ELPH: 3.1 %
ALPHA1 GLOB SERPL ELPH-MCNC: 0.3 G/DL
ALPHA2 GLOB MFR SERPL ELPH: 12.3 %
ALPHA2 GLOB SERPL ELPH-MCNC: 1.1 G/DL
ALT SERPL-CCNC: 18 U/L
ANION GAP SERPL CALC-SCNC: 15 MMOL/L
APPEARANCE: CLEAR
AST SERPL-CCNC: 26 U/L
B-GLOBULIN MFR SERPL ELPH: 10.4 %
B-GLOBULIN SERPL ELPH-MCNC: 0.9 G/DL
BACTERIA: NEGATIVE
BASOPHILS # BLD AUTO: 0.03 K/UL
BASOPHILS NFR BLD AUTO: 0.5 %
BILIRUB SERPL-MCNC: 0.5 MG/DL
BILIRUBIN URINE: NEGATIVE
BLOOD URINE: NEGATIVE
BUN SERPL-MCNC: 37 MG/DL
CALCIUM SERPL-MCNC: 10.2 MG/DL
CALCIUM SERPL-MCNC: 10.3 MG/DL
CHLORIDE SERPL-SCNC: 103 MMOL/L
CHOLEST SERPL-MCNC: 138 MG/DL
CHOLEST/HDLC SERPL: 2.5 RATIO
CK SERPL-CCNC: 120 U/L
CO2 SERPL-SCNC: 21 MMOL/L
COLOR: YELLOW
CREAT SERPL-MCNC: 2.21 MG/DL
CREAT SPEC-SCNC: 155 MG/DL
DEPRECATED KAPPA LC FREE/LAMBDA SER: 1.29 RATIO
EOSINOPHIL # BLD AUTO: 0.11 K/UL
EOSINOPHIL NFR BLD AUTO: 1.7 %
FOLATE SERPL-MCNC: >20 NG/ML
GAMMA GLOB FLD ELPH-MCNC: 1.4 G/DL
GAMMA GLOB MFR SERPL ELPH: 16.8 %
GLUCOSE QUALITATIVE U: NEGATIVE MG/DL
GLUCOSE SERPL-MCNC: 115 MG/DL
HAV IGM SER QL: NONREACTIVE
HBA1C MFR BLD HPLC: 6.3 %
HBV CORE IGM SER QL: NONREACTIVE
HBV SURFACE AG SER QL: NONREACTIVE
HCT VFR BLD CALC: 44.1 %
HCV AB SER QL: NONREACTIVE
HCV S/CO RATIO: 0.2 S/CO
HDLC SERPL-MCNC: 55 MG/DL
HGB BLD-MCNC: 14 G/DL
HIV1+2 AB SPEC QL IA.RAPID: NONREACTIVE
HYALINE CASTS: 4 /LPF
IGA SER QL IEP: 243 MG/DL
IGG SER QL IEP: 1520 MG/DL
IGM SER QL IEP: 77 MG/DL
IMM GRANULOCYTES NFR BLD AUTO: 0.3 %
INTERPRETATION SERPL IEP-IMP: NORMAL
IRON SERPL-MCNC: 91 UG/DL
KAPPA LC CSF-MCNC: 2.53 MG/DL
KAPPA LC SERPL-MCNC: 3.27 MG/DL
KETONES URINE: NEGATIVE
LDLC SERPL CALC-MCNC: 66 MG/DL
LEUKOCYTE ESTERASE URINE: NEGATIVE
LYMPHOCYTES # BLD AUTO: 1.44 K/UL
LYMPHOCYTES NFR BLD AUTO: 22.8 %
M PROTEIN MFR SERPL ELPH: NORMAL %
M PROTEIN SPEC IFE-MCNC: NORMAL
MAN DIFF?: NORMAL
MCHC RBC-ENTMCNC: 29 PG
MCHC RBC-ENTMCNC: 31.7 GM/DL
MCV RBC AUTO: 91.3 FL
MICROALBUMIN 24H UR DL<=1MG/L-MCNC: 147.5 MG/DL
MICROALBUMIN/CREAT 24H UR-RTO: 952 MG/G
MICROSCOPIC-UA: NORMAL
MONOCLON BAND OBS SERPL: NORMAL G/DL
MONOCYTES # BLD AUTO: 0.55 K/UL
MONOCYTES NFR BLD AUTO: 8.7 %
NEUTROPHILS # BLD AUTO: 4.16 K/UL
NEUTROPHILS NFR BLD AUTO: 66 %
NITRITE URINE: NEGATIVE
PARATHYROID HORMONE INTACT: 39 PG/ML
PH URINE: 5
PLATELET # BLD AUTO: 150 K/UL
POTASSIUM SERPL-SCNC: 4.9 MMOL/L
PROT SERPL-MCNC: 8.6 G/DL
PROTEIN URINE: 300 MG/DL
PSA SERPL-MCNC: 5.91 NG/ML
RBC # BLD: 4.83 M/UL
RBC # FLD: 15.4 %
RED BLOOD CELLS URINE: 2 /HPF
SODIUM SERPL-SCNC: 139 MMOL/L
SPECIFIC GRAVITY URINE: 1.02
SQUAMOUS EPITHELIAL CELLS: 0 /HPF
TRIGL SERPL-MCNC: 83 MG/DL
TSH SERPL-ACNC: 3.12 UIU/ML
URATE SERPL-MCNC: 9.6 MG/DL
UROBILINOGEN URINE: NEGATIVE MG/DL
VIT B12 SERPL-MCNC: 706 PG/ML
WBC # FLD AUTO: 6.31 K/UL
WHITE BLOOD CELLS URINE: 1 /HPF

## 2018-07-25 LAB — HEMOCCULT STL QL IA: NEGATIVE

## 2018-08-10 ENCOUNTER — APPOINTMENT (OUTPATIENT)
Dept: NEPHROLOGY | Facility: CLINIC | Age: 78
End: 2018-08-10
Payer: MEDICARE

## 2018-08-10 VITALS
SYSTOLIC BLOOD PRESSURE: 132 MMHG | WEIGHT: 167 LBS | HEART RATE: 64 BPM | DIASTOLIC BLOOD PRESSURE: 84 MMHG | BODY MASS INDEX: 25.02 KG/M2 | HEIGHT: 68.5 IN

## 2018-08-10 PROCEDURE — 36415 COLL VENOUS BLD VENIPUNCTURE: CPT

## 2018-08-10 PROCEDURE — 99214 OFFICE O/P EST MOD 30 MIN: CPT | Mod: 25

## 2018-08-10 RX ORDER — LATANOPROST/PF 0.005 %
0.01 DROPS OPHTHALMIC (EYE)
Qty: 2 | Refills: 0 | Status: ACTIVE | COMMUNITY
Start: 2018-07-03

## 2018-08-15 LAB
ALBUMIN SERPL ELPH-MCNC: 4.7 G/DL
ANION GAP SERPL CALC-SCNC: 15 MMOL/L
BUN SERPL-MCNC: 31 MG/DL
CALCIUM SERPL-MCNC: 9.9 MG/DL
CHLORIDE SERPL-SCNC: 101 MMOL/L
CO2 SERPL-SCNC: 24 MMOL/L
CREAT SERPL-MCNC: 2.24 MG/DL
GLUCOSE SERPL-MCNC: 117 MG/DL
PHOSPHATE SERPL-MCNC: 2.9 MG/DL
POTASSIUM SERPL-SCNC: 5.4 MMOL/L
SODIUM SERPL-SCNC: 140 MMOL/L

## 2018-09-05 LAB
ALBUMIN SERPL ELPH-MCNC: 4.7 G/DL
ANION GAP SERPL CALC-SCNC: 14 MMOL/L
BUN SERPL-MCNC: 28 MG/DL
CALCIUM SERPL-MCNC: 9.6 MG/DL
CHLORIDE SERPL-SCNC: 103 MMOL/L
CO2 SERPL-SCNC: 23 MMOL/L
CREAT SERPL-MCNC: 2.01 MG/DL
GLUCOSE SERPL-MCNC: 102 MG/DL
PHOSPHATE SERPL-MCNC: 3 MG/DL
POTASSIUM SERPL-SCNC: 4.6 MMOL/L
SODIUM SERPL-SCNC: 140 MMOL/L

## 2018-09-12 ENCOUNTER — RX RENEWAL (OUTPATIENT)
Age: 78
End: 2018-09-12

## 2018-09-18 ENCOUNTER — APPOINTMENT (OUTPATIENT)
Dept: ORTHOPEDIC SURGERY | Facility: CLINIC | Age: 78
End: 2018-09-18
Payer: MEDICARE

## 2018-09-18 VITALS
DIASTOLIC BLOOD PRESSURE: 65 MMHG | SYSTOLIC BLOOD PRESSURE: 126 MMHG | HEIGHT: 68.5 IN | WEIGHT: 167 LBS | BODY MASS INDEX: 25.02 KG/M2 | HEART RATE: 65 BPM

## 2018-09-18 PROCEDURE — 99214 OFFICE O/P EST MOD 30 MIN: CPT

## 2018-09-18 PROCEDURE — 73564 X-RAY EXAM KNEE 4 OR MORE: CPT | Mod: RT

## 2018-10-10 ENCOUNTER — NON-APPOINTMENT (OUTPATIENT)
Age: 78
End: 2018-10-10

## 2018-10-10 ENCOUNTER — APPOINTMENT (OUTPATIENT)
Dept: CARDIOLOGY | Facility: CLINIC | Age: 78
End: 2018-10-10
Payer: MEDICARE

## 2018-10-10 VITALS
WEIGHT: 174 LBS | TEMPERATURE: 98 F | HEART RATE: 60 BPM | HEIGHT: 68.5 IN | DIASTOLIC BLOOD PRESSURE: 73 MMHG | OXYGEN SATURATION: 97 % | SYSTOLIC BLOOD PRESSURE: 144 MMHG | BODY MASS INDEX: 26.07 KG/M2

## 2018-10-10 VITALS — SYSTOLIC BLOOD PRESSURE: 140 MMHG | DIASTOLIC BLOOD PRESSURE: 80 MMHG

## 2018-10-10 DIAGNOSIS — R01.1 CARDIAC MURMUR, UNSPECIFIED: ICD-10-CM

## 2018-10-10 PROCEDURE — 99214 OFFICE O/P EST MOD 30 MIN: CPT

## 2018-10-10 PROCEDURE — 93000 ELECTROCARDIOGRAM COMPLETE: CPT

## 2018-10-19 ENCOUNTER — APPOINTMENT (OUTPATIENT)
Dept: CARDIOLOGY | Facility: CLINIC | Age: 78
End: 2018-10-19
Payer: MEDICARE

## 2018-10-19 DIAGNOSIS — R00.2 PALPITATIONS: ICD-10-CM

## 2018-10-19 PROCEDURE — 93971 EXTREMITY STUDY: CPT

## 2018-10-19 PROCEDURE — 93923 UPR/LXTR ART STDY 3+ LVLS: CPT

## 2018-10-19 PROCEDURE — 93306 TTE W/DOPPLER COMPLETE: CPT

## 2018-10-26 ENCOUNTER — INBOUND DOCUMENT (OUTPATIENT)
Age: 78
End: 2018-10-26

## 2018-11-05 ENCOUNTER — APPOINTMENT (OUTPATIENT)
Dept: INTERNAL MEDICINE | Facility: CLINIC | Age: 78
End: 2018-11-05
Payer: MEDICARE

## 2018-11-05 ENCOUNTER — RX RENEWAL (OUTPATIENT)
Age: 78
End: 2018-11-05

## 2018-11-05 VITALS
WEIGHT: 173 LBS | HEIGHT: 69 IN | SYSTOLIC BLOOD PRESSURE: 146 MMHG | DIASTOLIC BLOOD PRESSURE: 76 MMHG | BODY MASS INDEX: 25.62 KG/M2 | HEART RATE: 60 BPM | TEMPERATURE: 97.9 F | OXYGEN SATURATION: 98 %

## 2018-11-05 DIAGNOSIS — M25.561 PAIN IN RIGHT KNEE: ICD-10-CM

## 2018-11-05 PROCEDURE — 99214 OFFICE O/P EST MOD 30 MIN: CPT | Mod: 25

## 2018-11-05 PROCEDURE — G0008: CPT

## 2018-11-05 PROCEDURE — 90662 IIV NO PRSV INCREASED AG IM: CPT

## 2018-11-05 PROCEDURE — 36415 COLL VENOUS BLD VENIPUNCTURE: CPT

## 2018-11-06 PROBLEM — M25.561 ACUTE PAIN OF RIGHT KNEE: Status: ACTIVE | Noted: 2018-09-19

## 2018-11-06 NOTE — ASSESSMENT
[FreeTextEntry1] : HTN\par BP usually well -controlled - a bit high today\par Continue present regimen\par Renal f/u\par Low sodium diet\par \par Hyperlipidemia\par Lipid profile sent today\par Low fat diet\par Continue Lipitor\par \par Diabetes\par HGBA1c sent today\par ADA diet\par Weight control\par Exercise\par Monitor FSG's\par Renal f/u\par Consider endocrine f/u\par To see podiatry \par To see ophtho for diabetic eye exam = appointment made\par Continue present meds\par \par Right leg pain and ? weakness\par ?radicular pain\par Neuro evaluation = referrals given\par Consider PT\par \par Full labs sent today\par Urine sent today\par Flu vaccine given\par Continue meds, diet, exercise as outlined\par F/U with all consulting MD's \par RTC 3 months and as needed \par To call for any medical issues

## 2018-11-06 NOTE — PHYSICAL EXAM
[No Acute Distress] : no acute distress [Well Nourished] : well nourished [Well Developed] : well developed [Well-Appearing] : well-appearing [Normal Voice/Communication] : normal voice/communication [Normal Sclera/Conjunctiva] : normal sclera/conjunctiva [PERRL] : pupils equal round and reactive to light [EOMI] : extraocular movements intact [Normal Outer Ear/Nose] : the outer ears and nose were normal in appearance [Normal Oropharynx] : the oropharynx was normal [No JVD] : no jugular venous distention [Supple] : supple [No Respiratory Distress] : no respiratory distress  [Clear to Auscultation] : lungs were clear to auscultation bilaterally [No Accessory Muscle Use] : no accessory muscle use [Normal Rate] : normal rate  [Regular Rhythm] : with a regular rhythm [Normal S1, S2] : normal S1 and S2 [No Carotid Bruits] : no carotid bruits [No Edema] : there was no peripheral edema [No Extremity Clubbing/Cyanosis] : no extremity clubbing/cyanosis [Soft] : abdomen soft [Non Tender] : non-tender [Normal Bowel Sounds] : normal bowel sounds [No CVA Tenderness] : no CVA  tenderness [No Spinal Tenderness] : no spinal tenderness [Grossly Normal Strength/Tone] : grossly normal strength/tone [No Rash] : no rash [Normal Gait] : normal gait [Coordination Grossly Intact] : coordination grossly intact [No Focal Deficits] : no focal deficits [Speech Grossly Normal] : speech grossly normal [Normal Affect] : the affect was normal [Alert and Oriented x3] : oriented to person, place, and time [de-identified] : no ST [de-identified] : no stridor [de-identified] : R=16 [de-identified] : no cords

## 2018-11-06 NOTE — HEALTH RISK ASSESSMENT
[No falls in past year] : Patient reported no falls in the past year [0] : 2) Feeling down, depressed, or hopeless: Not at all (0) [] : No [de-identified] : ortho, cardio, renal, ophtho [[id number]] : 0

## 2018-11-06 NOTE — HISTORY OF PRESENT ILLNESS
[de-identified] : See narrative below. [FreeTextEntry1] : Feeling well.  No f/c/s.\par No edema, orthopnea or PND.\par Normal BM/UO.\par Denies abdominal pain, n/v.\par Denies CP, SOB, cough, hemoptysis.\par Denies palpitations.\par Denies h/a or visual/speech disturbance.\par No new complaints.\par Still having issues with right leg pain and ?weakness.\par Saw ortho.\par Saw cardio.\par Had negative doppler.\par Saw renal.\par

## 2018-11-07 LAB
25(OH)D3 SERPL-MCNC: 37.9 NG/ML
ALBUMIN SERPL ELPH-MCNC: 4.3 G/DL
ALP BLD-CCNC: 54 U/L
ALT SERPL-CCNC: 21 U/L
ANION GAP SERPL CALC-SCNC: 14 MMOL/L
APPEARANCE: CLEAR
AST SERPL-CCNC: 29 U/L
BACTERIA: NEGATIVE
BASOPHILS # BLD AUTO: 0.03 K/UL
BASOPHILS NFR BLD AUTO: 0.5 %
BILIRUB SERPL-MCNC: 0.4 MG/DL
BILIRUBIN URINE: NEGATIVE
BLOOD URINE: NEGATIVE
BUN SERPL-MCNC: 31 MG/DL
CALCIUM SERPL-MCNC: 9.8 MG/DL
CHLORIDE SERPL-SCNC: 105 MMOL/L
CHOLEST SERPL-MCNC: 130 MG/DL
CHOLEST/HDLC SERPL: 3 RATIO
CO2 SERPL-SCNC: 25 MMOL/L
COLOR: YELLOW
CREAT SERPL-MCNC: 1.78 MG/DL
CREAT SPEC-SCNC: 149 MG/DL
EOSINOPHIL # BLD AUTO: 0.11 K/UL
EOSINOPHIL NFR BLD AUTO: 1.8 %
GLUCOSE QUALITATIVE U: NEGATIVE MG/DL
GLUCOSE SERPL-MCNC: 108 MG/DL
HBA1C MFR BLD HPLC: 6 %
HCT VFR BLD CALC: 43.3 %
HDLC SERPL-MCNC: 44 MG/DL
HGB BLD-MCNC: 13.3 G/DL
HYALINE CASTS: 2 /LPF
IMM GRANULOCYTES NFR BLD AUTO: 0.3 %
KETONES URINE: NEGATIVE
LDLC SERPL CALC-MCNC: 60 MG/DL
LEUKOCYTE ESTERASE URINE: NEGATIVE
LYMPHOCYTES # BLD AUTO: 1.46 K/UL
LYMPHOCYTES NFR BLD AUTO: 23.5 %
MAN DIFF?: NORMAL
MCHC RBC-ENTMCNC: 28.9 PG
MCHC RBC-ENTMCNC: 30.7 GM/DL
MCV RBC AUTO: 94.1 FL
MICROALBUMIN 24H UR DL<=1MG/L-MCNC: 221.8 MG/DL
MICROALBUMIN/CREAT 24H UR-RTO: 1484 MG/G
MICROSCOPIC-UA: NORMAL
MONOCYTES # BLD AUTO: 0.45 K/UL
MONOCYTES NFR BLD AUTO: 7.3 %
NEUTROPHILS # BLD AUTO: 4.13 K/UL
NEUTROPHILS NFR BLD AUTO: 66.6 %
NITRITE URINE: NEGATIVE
PH URINE: 5.5
PLATELET # BLD AUTO: 161 K/UL
POTASSIUM SERPL-SCNC: 4.4 MMOL/L
PROT SERPL-MCNC: 7.9 G/DL
PROTEIN URINE: 300 MG/DL
RBC # BLD: 4.6 M/UL
RBC # FLD: 15.1 %
RED BLOOD CELLS URINE: 1 /HPF
SODIUM SERPL-SCNC: 144 MMOL/L
SPECIFIC GRAVITY URINE: 1.02
SQUAMOUS EPITHELIAL CELLS: 0 /HPF
TRIGL SERPL-MCNC: 131 MG/DL
URATE SERPL-MCNC: 8.1 MG/DL
UROBILINOGEN URINE: NEGATIVE MG/DL
WBC # FLD AUTO: 6.2 K/UL
WHITE BLOOD CELLS URINE: 1 /HPF

## 2019-01-03 ENCOUNTER — RX RENEWAL (OUTPATIENT)
Age: 79
End: 2019-01-03

## 2019-01-08 ENCOUNTER — APPOINTMENT (OUTPATIENT)
Dept: OPHTHALMOLOGY | Facility: CLINIC | Age: 79
End: 2019-01-08

## 2019-02-08 ENCOUNTER — APPOINTMENT (OUTPATIENT)
Dept: NEPHROLOGY | Facility: CLINIC | Age: 79
End: 2019-02-08
Payer: MEDICARE

## 2019-02-08 VITALS
BODY MASS INDEX: 25.8 KG/M2 | OXYGEN SATURATION: 98 % | HEIGHT: 69 IN | WEIGHT: 174.16 LBS | SYSTOLIC BLOOD PRESSURE: 150 MMHG | HEART RATE: 57 BPM | DIASTOLIC BLOOD PRESSURE: 78 MMHG

## 2019-02-08 PROCEDURE — 99214 OFFICE O/P EST MOD 30 MIN: CPT

## 2019-02-08 NOTE — ASSESSMENT
[FreeTextEntry1] : 78-year-old with history of chronic kidney disease stage III, hypertension, diabetes, MGUS\par Chronic kidney disease stage III: Renal function at baseline.\par Mild hypercalcemia: improved\par Hypertension: BP elevated in office.  Whitecoat component.\par Diabetes: Improved\par Proteinuria: . Modest protein diet. Continue ACE. \par Hyperlipidemia: per Dr. Baca\par MGUS: Followup with Dr. Horton as scheduled. Recent Kappa lambda ratio from summer acceptable\par Consider gout as possible cause of right knee pain.  Patient will discuss with Dr. Baca regarding implementation of therapy repeat uric acid.\par 6 months follow-up with me

## 2019-02-08 NOTE — HISTORY OF PRESENT ILLNESS
[FreeTextEntry1] : 78-year-old with history of long-standing hypertension, diabetes, chronic kidney disease stage III here for follow-up. \par He is doing very well.\par He was in [place] for 3 months visiting his daughter.  Right knee intermittent discomfort and pain.  He had seen Angie O.  He also saw cardiology.\par He does have a history of gout however he was reluctant to try to take any medications.\par

## 2019-02-08 NOTE — PHYSICAL EXAM
[General Appearance - Alert] : alert [General Appearance - In No Acute Distress] : in no acute distress [Sclera] : the sclera and conjunctiva were normal [Outer Ear] : the ears and nose were normal in appearance [Neck Appearance] : the appearance of the neck was normal [Neck Cervical Mass (___cm)] : no neck mass was observed [Auscultation Breath Sounds / Voice Sounds] : lungs were clear to auscultation bilaterally [Heart Rate And Rhythm] : heart rate was normal and rhythm regular [Heart Sounds] : normal S1 and S2 [Heart Sounds Gallop] : no gallops [Heart Sounds Pericardial Friction Rub] : no pericardial rub [Edema] : there was no peripheral edema [Bowel Sounds] : normal bowel sounds [Abdomen Soft] : soft [No CVA Tenderness] : no ~M costovertebral angle tenderness [] : no rash [No Focal Deficits] : no focal deficits [Oriented To Time, Place, And Person] : oriented to person, place, and time [Affect] : the affect was normal [Mood] : the mood was normal

## 2019-02-13 ENCOUNTER — MEDICATION RENEWAL (OUTPATIENT)
Age: 79
End: 2019-02-13

## 2019-03-11 ENCOUNTER — APPOINTMENT (OUTPATIENT)
Dept: INTERNAL MEDICINE | Facility: CLINIC | Age: 79
End: 2019-03-11
Payer: MEDICARE

## 2019-03-11 VITALS
TEMPERATURE: 97.9 F | WEIGHT: 173 LBS | BODY MASS INDEX: 24.77 KG/M2 | HEART RATE: 64 BPM | SYSTOLIC BLOOD PRESSURE: 144 MMHG | DIASTOLIC BLOOD PRESSURE: 80 MMHG | HEIGHT: 70 IN | OXYGEN SATURATION: 97 %

## 2019-03-11 PROCEDURE — 36415 COLL VENOUS BLD VENIPUNCTURE: CPT

## 2019-03-11 PROCEDURE — 99214 OFFICE O/P EST MOD 30 MIN: CPT | Mod: 25

## 2019-03-11 RX ORDER — OSELTAMIVIR PHOSPHATE 75 MG/1
75 CAPSULE ORAL
Qty: 10 | Refills: 0 | Status: DISCONTINUED | COMMUNITY
Start: 2019-02-13 | End: 2019-03-11

## 2019-03-11 NOTE — PHYSICAL EXAM
[No Acute Distress] : no acute distress [Well Nourished] : well nourished [Well Developed] : well developed [Well-Appearing] : well-appearing [Normal Voice/Communication] : normal voice/communication [Normal Sclera/Conjunctiva] : normal sclera/conjunctiva [PERRL] : pupils equal round and reactive to light [EOMI] : extraocular movements intact [Normal Outer Ear/Nose] : the outer ears and nose were normal in appearance [Normal Oropharynx] : the oropharynx was normal [No JVD] : no jugular venous distention [Supple] : supple [No Respiratory Distress] : no respiratory distress  [Clear to Auscultation] : lungs were clear to auscultation bilaterally [No Accessory Muscle Use] : no accessory muscle use [Normal Rate] : normal rate  [Regular Rhythm] : with a regular rhythm [Normal S1, S2] : normal S1 and S2 [No Carotid Bruits] : no carotid bruits [No Edema] : there was no peripheral edema [No Extremity Clubbing/Cyanosis] : no extremity clubbing/cyanosis [Soft] : abdomen soft [Non Tender] : non-tender [Normal Bowel Sounds] : normal bowel sounds [No CVA Tenderness] : no CVA  tenderness [No Spinal Tenderness] : no spinal tenderness [Grossly Normal Strength/Tone] : grossly normal strength/tone [No Rash] : no rash [Normal Gait] : normal gait [Coordination Grossly Intact] : coordination grossly intact [No Focal Deficits] : no focal deficits [Speech Grossly Normal] : speech grossly normal [Normal Affect] : the affect was normal [Alert and Oriented x3] : oriented to person, place, and time [de-identified] : no ST [de-identified] : no stridor [de-identified] : R=16

## 2019-03-11 NOTE — ASSESSMENT
[FreeTextEntry1] : HTN\par BP usually well -controlled - a bit high today\par Continue present regimen\par Renal f/u\par Low sodium diet\par \par Hyperlipidemia\par Lipid profile sent today\par Low fat diet\par Continue Lipitor\par \par Diabetes\par HGBA1c sent today\par ADA diet\par Weight control\par Exercise\par Monitor FSG's\par Renal f/u\par Consider endocrine f/u\par To see podiatry \par To see ophtho for diabetic eye exam \par Continue present meds\par \par Full labs sent today\par Urine sent today\par Flu vaccine given 2018\par Continue meds, diet, exercise as outlined\par F/U with all consulting MD's \par RTC 3-4 months for CPE and as needed \par To call for any medical issues

## 2019-03-11 NOTE — HEALTH RISK ASSESSMENT
[No falls in past year] : Patient reported no falls in the past year [0] : 2) Feeling down, depressed, or hopeless: Not at all (0) [] : No [de-identified] : renal [de-identified] : goes to gym [de-identified] : low salt/low fat/ADA [[id number]] : 0

## 2019-03-11 NOTE — HISTORY OF PRESENT ILLNESS
[de-identified] : See narrative below. [FreeTextEntry1] : Feeling well.  No f/c/s.\par No edema, orthopnea or PND.\par Normal BM/UO.\par Denies abdominal pain, n/v.\par Denies CP, SOB, cough, hemoptysis.\par Denies palpitations.\par Denies h/a or visual/speech disturbance.\par No new complaints.\par Was in [place] for 3.5 months visiting family.\par Enjoys his 2 dogs.\par Never got the flu.\par

## 2019-03-13 ENCOUNTER — TRANSCRIPTION ENCOUNTER (OUTPATIENT)
Age: 79
End: 2019-03-13

## 2019-03-15 LAB
25(OH)D3 SERPL-MCNC: 37.5 NG/ML
ALBUMIN SERPL ELPH-MCNC: 4.6 G/DL
ALP BLD-CCNC: 57 U/L
ALT SERPL-CCNC: 17 U/L
ANION GAP SERPL CALC-SCNC: 11 MMOL/L
APPEARANCE: CLEAR
AST SERPL-CCNC: 20 U/L
BACTERIA: NEGATIVE
BASOPHILS # BLD AUTO: 0.06 K/UL
BASOPHILS NFR BLD AUTO: 1.1 %
BILIRUB SERPL-MCNC: 0.4 MG/DL
BILIRUBIN URINE: NEGATIVE
BLOOD URINE: NEGATIVE
BUN SERPL-MCNC: 27 MG/DL
CALCIUM SERPL-MCNC: 10 MG/DL
CHLORIDE SERPL-SCNC: 102 MMOL/L
CHOLEST SERPL-MCNC: 199 MG/DL
CHOLEST/HDLC SERPL: 4.3 RATIO
CO2 SERPL-SCNC: 26 MMOL/L
COLOR: YELLOW
CREAT SERPL-MCNC: 2.02 MG/DL
CREAT SPEC-SCNC: 174 MG/DL
EOSINOPHIL # BLD AUTO: 0.12 K/UL
EOSINOPHIL NFR BLD AUTO: 2.2 %
GLUCOSE QUALITATIVE U: NEGATIVE
GLUCOSE SERPL-MCNC: 133 MG/DL
HBA1C MFR BLD HPLC: 6.2 %
HCT VFR BLD CALC: 45.6 %
HDLC SERPL-MCNC: 46 MG/DL
HGB BLD-MCNC: 13.8 G/DL
HYALINE CASTS: 3 /LPF
IMM GRANULOCYTES NFR BLD AUTO: 0.2 %
KETONES URINE: NEGATIVE
LDLC SERPL CALC-MCNC: 129 MG/DL
LEUKOCYTE ESTERASE URINE: NEGATIVE
LYMPHOCYTES # BLD AUTO: 1.13 K/UL
LYMPHOCYTES NFR BLD AUTO: 20.5 %
MAN DIFF?: NORMAL
MCHC RBC-ENTMCNC: 28.5 PG
MCHC RBC-ENTMCNC: 30.3 GM/DL
MCV RBC AUTO: 94.2 FL
MICROALBUMIN 24H UR DL<=1MG/L-MCNC: 266.6 MG/DL
MICROALBUMIN/CREAT 24H UR-RTO: 1532 MG/G
MICROSCOPIC-UA: NORMAL
MONOCYTES # BLD AUTO: 0.43 K/UL
MONOCYTES NFR BLD AUTO: 7.8 %
NEUTROPHILS # BLD AUTO: 3.75 K/UL
NEUTROPHILS NFR BLD AUTO: 68.2 %
NITRITE URINE: NEGATIVE
PH URINE: 6
PLATELET # BLD AUTO: 167 K/UL
POTASSIUM SERPL-SCNC: 4.9 MMOL/L
PROT SERPL-MCNC: 7.6 G/DL
PROTEIN URINE: ABNORMAL
RBC # BLD: 4.84 M/UL
RBC # FLD: 15.1 %
RED BLOOD CELLS URINE: 1 /HPF
SODIUM SERPL-SCNC: 139 MMOL/L
SPECIFIC GRAVITY URINE: 1.02
SQUAMOUS EPITHELIAL CELLS: 1 /HPF
TRIGL SERPL-MCNC: 122 MG/DL
UROBILINOGEN URINE: NORMAL
WBC # FLD AUTO: 5.5 K/UL
WHITE BLOOD CELLS URINE: 1 /HPF

## 2019-03-22 ENCOUNTER — RX RENEWAL (OUTPATIENT)
Age: 79
End: 2019-03-22

## 2019-04-01 ENCOUNTER — RX RENEWAL (OUTPATIENT)
Age: 79
End: 2019-04-01

## 2019-04-02 ENCOUNTER — RX RENEWAL (OUTPATIENT)
Age: 79
End: 2019-04-02

## 2019-04-30 ENCOUNTER — RX RENEWAL (OUTPATIENT)
Age: 79
End: 2019-04-30

## 2019-05-02 ENCOUNTER — RX RENEWAL (OUTPATIENT)
Age: 79
End: 2019-05-02

## 2019-05-13 ENCOUNTER — RX RENEWAL (OUTPATIENT)
Age: 79
End: 2019-05-13

## 2019-06-21 ENCOUNTER — RX RENEWAL (OUTPATIENT)
Age: 79
End: 2019-06-21

## 2019-07-12 ENCOUNTER — RX RENEWAL (OUTPATIENT)
Age: 79
End: 2019-07-12

## 2019-07-18 ENCOUNTER — APPOINTMENT (OUTPATIENT)
Dept: INTERNAL MEDICINE | Facility: CLINIC | Age: 79
End: 2019-07-18
Payer: MEDICARE

## 2019-07-18 ENCOUNTER — NON-APPOINTMENT (OUTPATIENT)
Age: 79
End: 2019-07-18

## 2019-07-18 VITALS
SYSTOLIC BLOOD PRESSURE: 156 MMHG | HEIGHT: 68.75 IN | TEMPERATURE: 97.9 F | WEIGHT: 166.1 LBS | DIASTOLIC BLOOD PRESSURE: 80 MMHG | BODY MASS INDEX: 24.6 KG/M2 | OXYGEN SATURATION: 99 % | HEART RATE: 59 BPM

## 2019-07-18 DIAGNOSIS — I49.9 CARDIAC ARRHYTHMIA, UNSPECIFIED: ICD-10-CM

## 2019-07-18 PROCEDURE — G0439: CPT

## 2019-07-18 PROCEDURE — G0444 DEPRESSION SCREEN ANNUAL: CPT | Mod: 59

## 2019-07-18 PROCEDURE — 36415 COLL VENOUS BLD VENIPUNCTURE: CPT

## 2019-07-18 PROCEDURE — 93000 ELECTROCARDIOGRAM COMPLETE: CPT | Mod: 59

## 2019-07-18 NOTE — PHYSICAL EXAM
[No Acute Distress] : no acute distress [Well Nourished] : well nourished [Well Developed] : well developed [Well-Appearing] : well-appearing [Normal Voice/Communication] : normal voice/communication [Normal Sclera/Conjunctiva] : normal sclera/conjunctiva [PERRL] : pupils equal round and reactive to light [EOMI] : extraocular movements intact [Normal Outer Ear/Nose] : the outer ears and nose were normal in appearance [Normal Oropharynx] : the oropharynx was normal [Normal TMs] : both tympanic membranes were normal [No JVD] : no jugular venous distention [Supple] : supple [No Lymphadenopathy] : no lymphadenopathy [Thyroid Normal, No Nodules] : the thyroid was normal and there were no nodules present [No Respiratory Distress] : no respiratory distress  [Clear to Auscultation] : lungs were clear to auscultation bilaterally [No Accessory Muscle Use] : no accessory muscle use [Normal Rate] : normal rate  [Regular Rhythm] : with a regular rhythm [Normal S1, S2] : normal S1 and S2 [No Carotid Bruits] : no carotid bruits [Pedal Pulses Present] : the pedal pulses are present [No Edema] : there was no peripheral edema [No Extremity Clubbing/Cyanosis] : no extremity clubbing/cyanosis [Normal Appearance] : normal in appearance [No Nipple Discharge] : no nipple discharge [No Axillary Lymphadenopathy] : no axillary lymphadenopathy [Soft] : abdomen soft [Non Tender] : non-tender [Non-distended] : non-distended [No Masses] : no abdominal mass palpated [No HSM] : no HSM [Normal Bowel Sounds] : normal bowel sounds [Normal Supraclavicular Nodes] : no supraclavicular lymphadenopathy [Normal Axillary Nodes] : no axillary lymphadenopathy [Normal Posterior Cervical Nodes] : no posterior cervical lymphadenopathy [Normal Anterior Cervical Nodes] : no anterior cervical lymphadenopathy [No CVA Tenderness] : no CVA  tenderness [No Spinal Tenderness] : no spinal tenderness [Scoliosis] : scoliosis [Grossly Normal Strength/Tone] : grossly normal strength/tone [No Rash] : no rash [Normal Gait] : normal gait [Coordination Grossly Intact] : coordination grossly intact [No Focal Deficits] : no focal deficits [Deep Tendon Reflexes (DTR)] : deep tendon reflexes were 2+ and symmetric [Speech Grossly Normal] : speech grossly normal [Normal Affect] : the affect was normal [Alert and Oriented x3] : oriented to person, place, and time [de-identified] : no ST [de-identified] : no stridor [de-identified] : R=16 [de-identified] : murmur unchanged; extra beat on occasion [de-identified] : no cords; normal radial pulses [de-identified] : sees podiatry

## 2019-07-18 NOTE — HEALTH RISK ASSESSMENT
[No falls in past year] : Patient reported no falls in the past year [0] : 2) Feeling down, depressed, or hopeless: Not at all (0) [Patient reported colonoscopy was normal] : Patient reported colonoscopy was normal [None] : None [With Family] : lives with family [# of Members in Household ___] :  household currently consist of [unfilled] member(s) [Retired] : retired [College] : College [] :  [# Of Children ___] : has [unfilled] children [Feels Safe at Home] : Feels safe at home [Fully functional (bathing, dressing, toileting, transferring, walking, feeding)] : Fully functional (bathing, dressing, toileting, transferring, walking, feeding) [Fully functional (using the telephone, shopping, preparing meals, housekeeping, doing laundry, using] : Fully functional and needs no help or supervision to perform IADLs (using the telephone, shopping, preparing meals, housekeeping, doing laundry, using transportation, managing medications and managing finances) [Smoke Detector] : smoke detector [Carbon Monoxide Detector] : carbon monoxide detector [Guns at Home] : guns at home [Seat Belt] :  uses seat belt [DNR] : DNR [Good] : ~his/her~ current health as good [Excellent] : ~his/her~  mood as  excellent [No] : In the past 12 months have you used drugs other than those required for medical reasons? No [No Retinopathy] : No retinopathy [HIV test declined] : HIV test declined [Hepatitis C test declined] : Hepatitis C test declined [With Patient/Caregiver] : With Patient/Caregiver [Designated Healthcare Proxy] : Designated healthcare proxy [Name: ___] : Health Care Proxy's Name: [unfilled]  [Relationship: ___] : Relationship: [unfilled] [FreeTextEntry1] : irregular heartbeat on FIT bit [] : No [de-identified] : none [de-identified] : ophtho; dentist; renal; cardiology,ortho [de-identified] : exercises daily [de-identified] : regular [[id number]] : 0 [EyeExamDate] : 2018 [Change in mental status noted] : No change in mental status noted [Language] : denies difficulty with language [Behavior] : denies difficulty with behavior [Learning/Retaining New Information] : denies difficulty learning/retaining new information [Handling Complex Tasks] : denies difficulty handling complex tasks [Reasoning] : denies difficulty with reasoning [Spatial Ability and Orientation] : denies difficulty with spatial ability and orientation [Sexually Active] : not sexually active [Reports changes in hearing] : Reports no changes in hearing [Reports changes in vision] : Reports no changes in vision [Reports changes in dental health] : Reports no changes in dental health [Sunscreen] : does not use sunscreen [Travel to Developing Areas] : does not  travel to developing areas [TB Exposure] : is not being exposed to tuberculosis [Caregiver Concerns] : does not have caregiver concerns [MammogramDate] : NA [PapSmearDate] : NA [BoneDensityDate] : NA [ColonoscopyDate] : 1/2011 [AdvancecareDate] : 07/19

## 2019-07-18 NOTE — COUNSELING
[Healthy eating counseling provided] : healthy eating [Activity counseling provided] : activity [Low Fat Diet] : Low fat diet [Low Salt Diet] : Low salt diet [None] : None [Walking] : Walking [ - Annual Depression Screening] : Annual Depression Screening

## 2019-07-18 NOTE — ASSESSMENT
[FreeTextEntry1] : See health maintenance assessment and plan outlined below.\par In addition:\par Full labs and urine sent today\par Podiatry consult for diabetic foot care\par Ortho f/u as needed\par Had colonoscopy 1/31/2011 - due in 10 years\par Wishes to do FIT testing - states that he will not do colonoscopy again\par Hepatology f/u with  f/u liver US\par Renal f/u\par Urology f/u\par Hematology f/u\par Continue meds, diet, exercise as outlined\par EKG done and reviewed with patient and report scanned\par Cardiology f/u - to do f/u echo there\par CXR declined\par Vaccines d/w patient \par He declined Endocrine evaluation\par To see derm, ophtho, dentist, ENT\par RTC for annual physical \par RTC 3-4 months and as needed\par To call for any medical issues\par

## 2019-07-18 NOTE — HISTORY OF PRESENT ILLNESS
[FreeTextEntry1] : Comes in for annual physical. [de-identified] : Comes in for annual physical.\par Feeling well.\par Feels palpitation/irregular heartbeat.\par Busy gardening and caring for his 2 dogs.

## 2019-07-19 LAB
25(OH)D3 SERPL-MCNC: 40.3 NG/ML
AFP-TM SERPL-MCNC: <1.8 NG/ML
ALBUMIN SERPL ELPH-MCNC: 4.5 G/DL
ALP BLD-CCNC: 57 U/L
ALT SERPL-CCNC: 13 U/L
ANION GAP SERPL CALC-SCNC: 11 MMOL/L
APPEARANCE: CLEAR
AST SERPL-CCNC: 17 U/L
BACTERIA: NEGATIVE
BASOPHILS # BLD AUTO: 0.06 K/UL
BASOPHILS NFR BLD AUTO: 1 %
BILIRUB SERPL-MCNC: 0.4 MG/DL
BILIRUBIN URINE: NEGATIVE
BLOOD URINE: NEGATIVE
BUN SERPL-MCNC: 31 MG/DL
CALCIUM SERPL-MCNC: 9.7 MG/DL
CHLORIDE SERPL-SCNC: 104 MMOL/L
CHOLEST SERPL-MCNC: 136 MG/DL
CHOLEST/HDLC SERPL: 2.9 RATIO
CO2 SERPL-SCNC: 24 MMOL/L
COLOR: NORMAL
CREAT SERPL-MCNC: 2.01 MG/DL
EOSINOPHIL # BLD AUTO: 0.16 K/UL
EOSINOPHIL NFR BLD AUTO: 2.7 %
ESTIMATED AVERAGE GLUCOSE: 123 MG/DL
FOLATE SERPL-MCNC: >20 NG/ML
GLUCOSE QUALITATIVE U: NEGATIVE
GLUCOSE SERPL-MCNC: 114 MG/DL
HBA1C MFR BLD HPLC: 5.9 %
HCT VFR BLD CALC: 42.2 %
HDLC SERPL-MCNC: 47 MG/DL
HGB BLD-MCNC: 12.9 G/DL
HYALINE CASTS: 1 /LPF
IMM GRANULOCYTES NFR BLD AUTO: 0.3 %
IRON SERPL-MCNC: 77 UG/DL
KETONES URINE: NEGATIVE
LDLC SERPL CALC-MCNC: 69 MG/DL
LEUKOCYTE ESTERASE URINE: NEGATIVE
LYMPHOCYTES # BLD AUTO: 1.29 K/UL
LYMPHOCYTES NFR BLD AUTO: 21.5 %
MAN DIFF?: NORMAL
MCHC RBC-ENTMCNC: 28.9 PG
MCHC RBC-ENTMCNC: 30.6 GM/DL
MCV RBC AUTO: 94.4 FL
MICROSCOPIC-UA: NORMAL
MONOCYTES # BLD AUTO: 0.56 K/UL
MONOCYTES NFR BLD AUTO: 9.3 %
NEUTROPHILS # BLD AUTO: 3.92 K/UL
NEUTROPHILS NFR BLD AUTO: 65.2 %
NITRITE URINE: NEGATIVE
PH URINE: 6
PLATELET # BLD AUTO: 132 K/UL
POTASSIUM SERPL-SCNC: 4.8 MMOL/L
PROT SERPL-MCNC: 7.6 G/DL
PROTEIN URINE: ABNORMAL
PSA SERPL-MCNC: 5.78 NG/ML
RBC # BLD: 4.47 M/UL
RBC # FLD: 15.1 %
RED BLOOD CELLS URINE: 1 /HPF
SODIUM SERPL-SCNC: 139 MMOL/L
SPECIFIC GRAVITY URINE: 1.02
SQUAMOUS EPITHELIAL CELLS: 1 /HPF
TRIGL SERPL-MCNC: 98 MG/DL
TSH SERPL-ACNC: 2.52 UIU/ML
UROBILINOGEN URINE: NORMAL
VIT B12 SERPL-MCNC: 807 PG/ML
WBC # FLD AUTO: 6.01 K/UL
WHITE BLOOD CELLS URINE: 1 /HPF

## 2019-07-29 ENCOUNTER — RX RENEWAL (OUTPATIENT)
Age: 79
End: 2019-07-29

## 2019-07-29 ENCOUNTER — MEDICATION RENEWAL (OUTPATIENT)
Age: 79
End: 2019-07-29

## 2019-08-06 ENCOUNTER — APPOINTMENT (OUTPATIENT)
Dept: INTERNAL MEDICINE | Facility: CLINIC | Age: 79
End: 2019-08-06
Payer: MEDICARE

## 2019-08-06 PROCEDURE — 36415 COLL VENOUS BLD VENIPUNCTURE: CPT

## 2019-08-09 ENCOUNTER — APPOINTMENT (OUTPATIENT)
Dept: NEPHROLOGY | Facility: CLINIC | Age: 79
End: 2019-08-09
Payer: MEDICARE

## 2019-08-09 VITALS
HEART RATE: 58 BPM | SYSTOLIC BLOOD PRESSURE: 122 MMHG | WEIGHT: 165.34 LBS | OXYGEN SATURATION: 96 % | HEIGHT: 68 IN | BODY MASS INDEX: 25.06 KG/M2 | DIASTOLIC BLOOD PRESSURE: 70 MMHG

## 2019-08-09 LAB
ANION GAP SERPL CALC-SCNC: 11 MMOL/L
BASOPHILS # BLD AUTO: 0.05 K/UL
BASOPHILS NFR BLD AUTO: 0.5 %
BUN SERPL-MCNC: 31 MG/DL
CALCIUM SERPL-MCNC: 9.5 MG/DL
CHLORIDE SERPL-SCNC: 103 MMOL/L
CO2 SERPL-SCNC: 25 MMOL/L
CREAT SERPL-MCNC: 2.02 MG/DL
EOSINOPHIL # BLD AUTO: 0.15 K/UL
EOSINOPHIL NFR BLD AUTO: 1.6 %
GLUCOSE SERPL-MCNC: 130 MG/DL
HCT VFR BLD CALC: 41.2 %
HGB BLD-MCNC: 12.6 G/DL
IMM GRANULOCYTES NFR BLD AUTO: 1.1 %
LYMPHOCYTES # BLD AUTO: 2.02 K/UL
LYMPHOCYTES NFR BLD AUTO: 21 %
MAN DIFF?: NORMAL
MCHC RBC-ENTMCNC: 28.3 PG
MCHC RBC-ENTMCNC: 30.6 GM/DL
MCV RBC AUTO: 92.4 FL
MONOCYTES # BLD AUTO: 1.17 K/UL
MONOCYTES NFR BLD AUTO: 12.1 %
NEUTROPHILS # BLD AUTO: 6.14 K/UL
NEUTROPHILS NFR BLD AUTO: 63.7 %
PLATELET # BLD AUTO: 213 K/UL
POTASSIUM SERPL-SCNC: 4.8 MMOL/L
RBC # BLD: 4.46 M/UL
RBC # FLD: 13.8 %
SODIUM SERPL-SCNC: 139 MMOL/L
URATE SERPL-MCNC: 7.9 MG/DL
WBC # FLD AUTO: 9.64 K/UL

## 2019-08-09 PROCEDURE — 99214 OFFICE O/P EST MOD 30 MIN: CPT

## 2019-08-09 NOTE — HISTORY OF PRESENT ILLNESS
[FreeTextEntry1] : 79-year-old with history of long-standing hypertension, diabetes, chronic kidney disease stage III here for follow-up. \par He had a recent attack of gout in both of his feet.  Was given steroid pack by Dr. Baca.  He was also started on allopurinol 100 mg daily.

## 2019-08-09 NOTE — PHYSICAL EXAM
[General Appearance - In No Acute Distress] : in no acute distress [General Appearance - Alert] : alert [Sclera] : the sclera and conjunctiva were normal [Outer Ear] : the ears and nose were normal in appearance [Neck Appearance] : the appearance of the neck was normal [Neck Cervical Mass (___cm)] : no neck mass was observed [Heart Rate And Rhythm] : heart rate was normal and rhythm regular [Auscultation Breath Sounds / Voice Sounds] : lungs were clear to auscultation bilaterally [Heart Sounds] : normal S1 and S2 [Heart Sounds Pericardial Friction Rub] : no pericardial rub [Heart Sounds Gallop] : no gallops [Edema] : there was no peripheral edema [Bowel Sounds] : normal bowel sounds [Abdomen Soft] : soft [No CVA Tenderness] : no ~M costovertebral angle tenderness [] : no rash [No Focal Deficits] : no focal deficits [Oriented To Time, Place, And Person] : oriented to person, place, and time [Affect] : the affect was normal [Mood] : the mood was normal

## 2019-08-09 NOTE — ASSESSMENT
[FreeTextEntry1] : 79-year-old with history of chronic kidney disease stage III, hypertension, diabetes, MGUS\par Chronic kidney disease stage III: Renal function at baseline.\par Mild hypercalcemia: improved\par Hypertension: BP well controlled\par Diabetes: Improved\par Proteinuria: . Modest protein diet. Continue ACE. \par Hyperlipidemia: per Dr. Baca\par MGUS: Followup with Dr. Horton\par Gout: Now on allopurinol.  Increase hydration and also may decrease diuretic dose during the acute flare.\par 6 months follow-up with me

## 2019-09-09 ENCOUNTER — RX RENEWAL (OUTPATIENT)
Age: 79
End: 2019-09-09

## 2019-09-16 ENCOUNTER — RX RENEWAL (OUTPATIENT)
Age: 79
End: 2019-09-16

## 2019-10-11 ENCOUNTER — APPOINTMENT (OUTPATIENT)
Dept: UROLOGY | Facility: CLINIC | Age: 79
End: 2019-10-11
Payer: MEDICARE

## 2019-10-11 PROCEDURE — 99213 OFFICE O/P EST LOW 20 MIN: CPT

## 2019-10-11 NOTE — ASSESSMENT
[FreeTextEntry1] : Patient is a 80 yo M who presents with BPH/LUTS and elevated PSA\par \par -instructed pt to see PCP re LNs, appears small/subcentimeter and nontender\par -benign JASON\par -relatively stable PSA\par -d/w pt further intervention such as prostate biopsy.  MRI is an option but often not covered by insurance \par -after discussion, will plan for f/u 1 yr and serial PSA testing at this time

## 2019-10-11 NOTE — REVIEW OF SYSTEMS
[Eyesight Problems] : eyesight problems [Poor quality erections] : Poor quality erections [Seen by urologist before (Name)  ___] : Preciously seen by a urologist: [unfilled] [Joint Pain] : joint pain [Wake up at night to urinate  How many times?  ___] : wakes up to urinate [unfilled] times during the night [Negative] : Heme/Lymph

## 2019-10-11 NOTE — PHYSICAL EXAM
[General Appearance - Well Developed] : well developed [General Appearance - Well Nourished] : well nourished [Normal Appearance] : normal appearance [Well Groomed] : well groomed [General Appearance - In No Acute Distress] : no acute distress [Urethral Meatus] : meatus normal [Urinary Bladder Findings] : the bladder was normal on palpation [Scrotum] : the scrotum was normal [Testes Tenderness] : no tenderness of the testes [Prostate Tenderness] : the prostate was not tender [Testes Mass (___cm)] : there were no testicular masses [No Prostate Nodules] : no prostate nodules [Prostate Size ___ gm] : prostate size [unfilled] gm [FreeTextEntry1] : shotty subcentimeter b/l inguinal LNs

## 2019-10-11 NOTE — HISTORY OF PRESENT ILLNESS
[FreeTextEntry1] : Patient is a 78 yo M who presents with BPH/LUTS and elevated PSA.  He is a pt of Dr Hines but unable to make appt for months to see him and therefore presents to me today.  LUTS well controlled with doxazosin, mainly nocturia 2.  AUA SS 6, bother 2. \par \par He is followed by Dr Hinse for elevated PSA in the past, range ~5.\par \par Most recently PSA in 7/2019 5.78, however has been 5 in 2017 and 2016.\par \par No dysuria, incontinence, retention, hematuria.

## 2019-10-28 ENCOUNTER — RX RENEWAL (OUTPATIENT)
Age: 79
End: 2019-10-28

## 2019-11-12 ENCOUNTER — RX RENEWAL (OUTPATIENT)
Age: 79
End: 2019-11-12

## 2019-11-13 ENCOUNTER — APPOINTMENT (OUTPATIENT)
Dept: INTERNAL MEDICINE | Facility: CLINIC | Age: 79
End: 2019-11-13
Payer: MEDICARE

## 2019-11-13 VITALS
HEART RATE: 60 BPM | HEIGHT: 69 IN | SYSTOLIC BLOOD PRESSURE: 146 MMHG | BODY MASS INDEX: 25.03 KG/M2 | DIASTOLIC BLOOD PRESSURE: 70 MMHG | OXYGEN SATURATION: 99 % | TEMPERATURE: 97.8 F | WEIGHT: 169 LBS

## 2019-11-13 PROCEDURE — G0008: CPT

## 2019-11-13 PROCEDURE — 36415 COLL VENOUS BLD VENIPUNCTURE: CPT

## 2019-11-13 PROCEDURE — 90653 IIV ADJUVANT VACCINE IM: CPT

## 2019-11-13 PROCEDURE — 99214 OFFICE O/P EST MOD 30 MIN: CPT | Mod: 25

## 2019-11-13 NOTE — PHYSICAL EXAM
[No Acute Distress] : no acute distress [Well Nourished] : well nourished [Well Developed] : well developed [Well-Appearing] : well-appearing [Normal Voice/Communication] : normal voice/communication [Normal Sclera/Conjunctiva] : normal sclera/conjunctiva [PERRL] : pupils equal round and reactive to light [EOMI] : extraocular movements intact [Normal Outer Ear/Nose] : the outer ears and nose were normal in appearance [Normal Oropharynx] : the oropharynx was normal [No JVD] : no jugular venous distention [Supple] : supple [No Respiratory Distress] : no respiratory distress  [Clear to Auscultation] : lungs were clear to auscultation bilaterally [No Accessory Muscle Use] : no accessory muscle use [Normal Rate] : normal rate  [Regular Rhythm] : with a regular rhythm [Normal S1, S2] : normal S1 and S2 [No Carotid Bruits] : no carotid bruits [No Edema] : there was no peripheral edema [No Extremity Clubbing/Cyanosis] : no extremity clubbing/cyanosis [Soft] : abdomen soft [Non Tender] : non-tender [Normal Bowel Sounds] : normal bowel sounds [No CVA Tenderness] : no CVA  tenderness [No Spinal Tenderness] : no spinal tenderness [Grossly Normal Strength/Tone] : grossly normal strength/tone [No Rash] : no rash [Normal Gait] : normal gait [Coordination Grossly Intact] : coordination grossly intact [No Focal Deficits] : no focal deficits [Speech Grossly Normal] : speech grossly normal [Normal Affect] : the affect was normal [Alert and Oriented x3] : oriented to person, place, and time [Normal TMs] : both tympanic membranes were normal [de-identified] : no ST [de-identified] : no stridor [de-identified] : R=16

## 2019-11-13 NOTE — COUNSELING
[Weight management counseling provided] : Weight management [Healthy eating counseling provided] : healthy eating [Activity counseling provided] : activity [Needs reinforcement, provided] : Patient needs reinforcement on understanding of disease, goals and obesity follow-up plan; reinforcement was provided [Weight Self Once Weekly] : Weight self once weekly [Low Salt Diet] : Low salt diet [Low Fat Diet] : Low fat diet

## 2019-11-13 NOTE — ASSESSMENT
[FreeTextEntry1] : HTN\par BP fairly well -controlled \par Continue present regimen\par Renal f/u\par Low sodium diet\par \par Hyperlipidemia\par Lipid profile sent today\par Low fat diet\par Continue Lipitor\par \par Diabetes\par HGBA1c sent today\par ADA diet\par Weight control\par Exercise\par Monitor FSG's\par Renal f/u\par Consider endocrine f/u\par To see podiatry \par To see ophtho for diabetic eye exam \par Continue present meds\par \par Full labs sent today\par Urine sent today\par Flu vaccine given 2019\par Continue meds, diet, exercise as outlined\par F/U with all consulting MD's \par RTC 3-4 months  and as needed \par To call for any medical issues

## 2019-11-13 NOTE — HISTORY OF PRESENT ILLNESS
[FreeTextEntry1] : Feeling well.  No f/c/s.\par No edema, orthopnea or PND.\par Normal BM/UO.\par Denies abdominal pain, n/v.\par Denies CP, SOB, cough, hemoptysis.\par Denies palpitations.\par Denies h/a or visual/speech disturbance.\par No new complaints.\par Enjoys his 2 dogs.\par Has seen Renal and urology in f/u.\par  [de-identified] : See narrative below.

## 2019-11-13 NOTE — HEALTH RISK ASSESSMENT
[No falls in past year] : Patient reported no falls in the past year [0] : 2) Feeling down, depressed, or hopeless: Not at all (0) [No] : In the past 12 months have you used drugs other than those required for medical reasons? No [de-identified] : renal, urology [] : No [de-identified] : goes to gym [de-identified] : low salt/low fat/ADA [[id number]] : 0

## 2019-11-15 LAB
25(OH)D3 SERPL-MCNC: 42.2 NG/ML
ALBUMIN SERPL ELPH-MCNC: 4.6 G/DL
ALP BLD-CCNC: 58 U/L
ALT SERPL-CCNC: 20 U/L
ANION GAP SERPL CALC-SCNC: 14 MMOL/L
APPEARANCE: CLEAR
AST SERPL-CCNC: 21 U/L
BACTERIA: NEGATIVE
BASOPHILS # BLD AUTO: 0.05 K/UL
BASOPHILS NFR BLD AUTO: 0.9 %
BILIRUB SERPL-MCNC: 0.3 MG/DL
BILIRUBIN URINE: NEGATIVE
BLOOD URINE: NEGATIVE
BUN SERPL-MCNC: 30 MG/DL
CALCIUM SERPL-MCNC: 9.8 MG/DL
CHLORIDE SERPL-SCNC: 105 MMOL/L
CHOLEST SERPL-MCNC: 136 MG/DL
CHOLEST/HDLC SERPL: 2.6 RATIO
CO2 SERPL-SCNC: 22 MMOL/L
COLOR: NORMAL
CREAT SERPL-MCNC: 2 MG/DL
CREAT SPEC-SCNC: 104 MG/DL
EOSINOPHIL # BLD AUTO: 0.14 K/UL
EOSINOPHIL NFR BLD AUTO: 2.4 %
ESTIMATED AVERAGE GLUCOSE: 126 MG/DL
GLUCOSE QUALITATIVE U: NEGATIVE
GLUCOSE SERPL-MCNC: 105 MG/DL
HBA1C MFR BLD HPLC: 6 %
HCT VFR BLD CALC: 40.4 %
HDLC SERPL-MCNC: 53 MG/DL
HGB BLD-MCNC: 12.4 G/DL
HYALINE CASTS: 1 /LPF
IMM GRANULOCYTES NFR BLD AUTO: 0.2 %
KETONES URINE: NEGATIVE
LDLC SERPL CALC-MCNC: 67 MG/DL
LEUKOCYTE ESTERASE URINE: NEGATIVE
LYMPHOCYTES # BLD AUTO: 1.3 K/UL
LYMPHOCYTES NFR BLD AUTO: 22.6 %
MAN DIFF?: NORMAL
MCHC RBC-ENTMCNC: 28.7 PG
MCHC RBC-ENTMCNC: 30.7 GM/DL
MCV RBC AUTO: 93.5 FL
MICROALBUMIN 24H UR DL<=1MG/L-MCNC: 233.5 MG/DL
MICROALBUMIN/CREAT 24H UR-RTO: 2247 MG/G
MICROSCOPIC-UA: NORMAL
MONOCYTES # BLD AUTO: 0.47 K/UL
MONOCYTES NFR BLD AUTO: 8.2 %
NEUTROPHILS # BLD AUTO: 3.78 K/UL
NEUTROPHILS NFR BLD AUTO: 65.7 %
NITRITE URINE: NEGATIVE
PH URINE: 6
PLATELET # BLD AUTO: 154 K/UL
POTASSIUM SERPL-SCNC: 4.7 MMOL/L
PROT SERPL-MCNC: 7.3 G/DL
PROTEIN URINE: ABNORMAL
RBC # BLD: 4.32 M/UL
RBC # FLD: 16.4 %
RED BLOOD CELLS URINE: 0 /HPF
SODIUM SERPL-SCNC: 141 MMOL/L
SPECIFIC GRAVITY URINE: 1.02
SQUAMOUS EPITHELIAL CELLS: 1 /HPF
TRIGL SERPL-MCNC: 82 MG/DL
TSH SERPL-ACNC: 2.79 UIU/ML
URATE SERPL-MCNC: 5.9 MG/DL
UROBILINOGEN URINE: NORMAL
WBC # FLD AUTO: 5.75 K/UL
WHITE BLOOD CELLS URINE: 1 /HPF

## 2019-12-10 ENCOUNTER — MEDICATION RENEWAL (OUTPATIENT)
Age: 79
End: 2019-12-10

## 2020-01-02 ENCOUNTER — MEDICATION RENEWAL (OUTPATIENT)
Age: 80
End: 2020-01-02

## 2020-01-07 ENCOUNTER — RX RENEWAL (OUTPATIENT)
Age: 80
End: 2020-01-07

## 2020-01-26 ENCOUNTER — RX RENEWAL (OUTPATIENT)
Age: 80
End: 2020-01-26

## 2020-02-04 ENCOUNTER — APPOINTMENT (OUTPATIENT)
Dept: NEPHROLOGY | Facility: CLINIC | Age: 80
End: 2020-02-04
Payer: MEDICARE

## 2020-02-04 VITALS
SYSTOLIC BLOOD PRESSURE: 145 MMHG | HEART RATE: 68 BPM | WEIGHT: 169.75 LBS | BODY MASS INDEX: 25.07 KG/M2 | OXYGEN SATURATION: 98 % | DIASTOLIC BLOOD PRESSURE: 80 MMHG

## 2020-02-04 VITALS — DIASTOLIC BLOOD PRESSURE: 80 MMHG | HEART RATE: 70 BPM | SYSTOLIC BLOOD PRESSURE: 140 MMHG

## 2020-02-04 PROCEDURE — 99214 OFFICE O/P EST MOD 30 MIN: CPT

## 2020-02-04 NOTE — PHYSICAL EXAM
[General Appearance - In No Acute Distress] : in no acute distress [General Appearance - Alert] : alert [Neck Appearance] : the appearance of the neck was normal [Sclera] : the sclera and conjunctiva were normal [Outer Ear] : the ears and nose were normal in appearance [Neck Cervical Mass (___cm)] : no neck mass was observed [Auscultation Breath Sounds / Voice Sounds] : lungs were clear to auscultation bilaterally [Heart Sounds] : normal S1 and S2 [Heart Rate And Rhythm] : heart rate was normal and rhythm regular [Edema] : there was no peripheral edema [Heart Sounds Pericardial Friction Rub] : no pericardial rub [Bowel Sounds] : normal bowel sounds [Abdomen Soft] : soft [Involuntary Movements] : no involuntary movements were seen [] : no rash [Affect] : the affect was normal [Oriented To Time, Place, And Person] : oriented to person, place, and time [No Focal Deficits] : no focal deficits [Mood] : the mood was normal

## 2020-02-04 NOTE — HISTORY OF PRESENT ILLNESS
[FreeTextEntry1] : 79-year-old with history of long-standing hypertension, diabetes, chronic kidney disease stage III here for follow-up. \par One of his dogs is diabetic and he is upset and giving insulin

## 2020-02-04 NOTE — ASSESSMENT
[FreeTextEntry1] : 79-year-old with history of chronic kidney disease stage III, hypertension, diabetes, MGUS\par Chronic kidney disease stage III: Renal function at baseline.\par Mild hypercalcemia: improved\par Hypertension: BP slightly higher than baseline\par Diabetes: stable\par Proteinuria: Trend -Modest protein diet. Continue ACE. \par Hyperlipidemia: per Dr. Baca\par MGUS: Followup with Dr. Horton\par Gout: Now on allopurinol.  Increase hydration and also may decrease diuretic dose during the acute flare.\par 6 months follow-up with me

## 2020-02-12 ENCOUNTER — RX RENEWAL (OUTPATIENT)
Age: 80
End: 2020-02-12

## 2020-03-03 ENCOUNTER — RX RENEWAL (OUTPATIENT)
Age: 80
End: 2020-03-03

## 2020-03-10 ENCOUNTER — APPOINTMENT (OUTPATIENT)
Dept: INTERNAL MEDICINE | Facility: CLINIC | Age: 80
End: 2020-03-10

## 2020-03-23 ENCOUNTER — RX RENEWAL (OUTPATIENT)
Age: 80
End: 2020-03-23

## 2020-04-17 ENCOUNTER — RX RENEWAL (OUTPATIENT)
Age: 80
End: 2020-04-17

## 2020-04-20 ENCOUNTER — RX RENEWAL (OUTPATIENT)
Age: 80
End: 2020-04-20

## 2020-04-22 ENCOUNTER — APPOINTMENT (OUTPATIENT)
Dept: UROLOGY | Facility: CLINIC | Age: 80
End: 2020-04-22

## 2020-05-05 ENCOUNTER — RX RENEWAL (OUTPATIENT)
Age: 80
End: 2020-05-05

## 2020-06-16 ENCOUNTER — RX RENEWAL (OUTPATIENT)
Age: 80
End: 2020-06-16

## 2020-07-05 ENCOUNTER — RX RENEWAL (OUTPATIENT)
Age: 80
End: 2020-07-05

## 2020-07-06 ENCOUNTER — RX RENEWAL (OUTPATIENT)
Age: 80
End: 2020-07-06

## 2020-07-08 ENCOUNTER — RX RENEWAL (OUTPATIENT)
Age: 80
End: 2020-07-08

## 2020-08-03 ENCOUNTER — RX RENEWAL (OUTPATIENT)
Age: 80
End: 2020-08-03

## 2020-08-25 ENCOUNTER — APPOINTMENT (OUTPATIENT)
Dept: NEPHROLOGY | Facility: CLINIC | Age: 80
End: 2020-08-25
Payer: MEDICARE

## 2020-08-25 VITALS — HEART RATE: 60 BPM | DIASTOLIC BLOOD PRESSURE: 80 MMHG | SYSTOLIC BLOOD PRESSURE: 142 MMHG

## 2020-08-25 VITALS
BODY MASS INDEX: 24.91 KG/M2 | WEIGHT: 168.65 LBS | HEART RATE: 59 BPM | OXYGEN SATURATION: 98 % | SYSTOLIC BLOOD PRESSURE: 153 MMHG | DIASTOLIC BLOOD PRESSURE: 79 MMHG

## 2020-08-25 PROCEDURE — 99214 OFFICE O/P EST MOD 30 MIN: CPT

## 2020-08-25 NOTE — ASSESSMENT
[FreeTextEntry1] : 80-year-old with history of chronic kidney disease stage III, hypertension, diabetes, MGUS\par Chronic kidney disease stage III: Renal function at baseline.  Will check labs today.\par Mild hypercalcemia: Trend\par Hypertension: BP stable\par Diabetes: stable\par Proteinuria: Trend -Modest protein diet. Continue ACE. \par Hyperlipidemia: per Dr. Baca\par MGUS: Has not followed up with Dr. Horton recently he would like to hold off.\par Gout: Now on allopurinol.  Check uric acid level.\par 6 months follow-up with me

## 2020-08-25 NOTE — HISTORY OF PRESENT ILLNESS
[FreeTextEntry1] : 80-year-old with history of long-standing hypertension, diabetes, chronic kidney disease stage III here for follow-up. \par He has been doing well.  Staying safe in the pandemic.  He is taking care of his aging dogs.

## 2020-08-25 NOTE — PHYSICAL EXAM
[General Appearance - Alert] : alert [General Appearance - In No Acute Distress] : in no acute distress [Outer Ear] : the ears and nose were normal in appearance [Neck Appearance] : the appearance of the neck was normal [Sclera] : the sclera and conjunctiva were normal [Neck Cervical Mass (___cm)] : no neck mass was observed [Auscultation Breath Sounds / Voice Sounds] : lungs were clear to auscultation bilaterally [Heart Sounds Pericardial Friction Rub] : no pericardial rub [Heart Rate And Rhythm] : heart rate was normal and rhythm regular [Heart Sounds] : normal S1 and S2 [Abdomen Soft] : soft [Edema] : there was no peripheral edema [Bowel Sounds] : normal bowel sounds [Involuntary Movements] : no involuntary movements were seen [] : no rash [No Focal Deficits] : no focal deficits [Oriented To Time, Place, And Person] : oriented to person, place, and time [Affect] : the affect was normal [Mood] : the mood was normal

## 2020-08-27 LAB
25(OH)D3 SERPL-MCNC: 45.3 NG/ML
ALBUMIN SERPL ELPH-MCNC: 4.9 G/DL
ALP BLD-CCNC: 61 U/L
ALT SERPL-CCNC: 14 U/L
ANION GAP SERPL CALC-SCNC: 13 MMOL/L
APPEARANCE: CLEAR
AST SERPL-CCNC: 17 U/L
BACTERIA: NEGATIVE
BASOPHILS # BLD AUTO: 0.06 K/UL
BASOPHILS NFR BLD AUTO: 0.9 %
BILIRUB SERPL-MCNC: 0.4 MG/DL
BILIRUBIN URINE: NEGATIVE
BLOOD URINE: NEGATIVE
BUN SERPL-MCNC: 29 MG/DL
CALCIUM SERPL-MCNC: 10 MG/DL
CALCIUM SERPL-MCNC: 10 MG/DL
CHLORIDE SERPL-SCNC: 105 MMOL/L
CHOLEST SERPL-MCNC: 126 MG/DL
CHOLEST/HDLC SERPL: 2.9 RATIO
CO2 SERPL-SCNC: 23 MMOL/L
COLOR: YELLOW
CREAT SERPL-MCNC: 2.41 MG/DL
CREAT SPEC-SCNC: 206 MG/DL
DEPRECATED KAPPA LC FREE/LAMBDA SER: 1.92 RATIO
EOSINOPHIL # BLD AUTO: 0.2 K/UL
EOSINOPHIL NFR BLD AUTO: 3 %
ESTIMATED AVERAGE GLUCOSE: 120 MG/DL
GLUCOSE QUALITATIVE U: NEGATIVE
GLUCOSE SERPL-MCNC: 124 MG/DL
HBA1C MFR BLD HPLC: 5.8 %
HCT VFR BLD CALC: 42 %
HDLC SERPL-MCNC: 44 MG/DL
HGB BLD-MCNC: 13 G/DL
HYALINE CASTS: 4 /LPF
IMM GRANULOCYTES NFR BLD AUTO: 0.3 %
KAPPA LC CSF-MCNC: 2.6 MG/DL
KAPPA LC SERPL-MCNC: 4.98 MG/DL
KETONES URINE: NEGATIVE
LDLC SERPL CALC-MCNC: 59 MG/DL
LEUKOCYTE ESTERASE URINE: NEGATIVE
LYMPHOCYTES # BLD AUTO: 1.38 K/UL
LYMPHOCYTES NFR BLD AUTO: 20.5 %
MAGNESIUM SERPL-MCNC: 2.6 MG/DL
MAN DIFF?: NORMAL
MCHC RBC-ENTMCNC: 28.8 PG
MCHC RBC-ENTMCNC: 31 GM/DL
MCV RBC AUTO: 93.1 FL
MICROALBUMIN 24H UR DL<=1MG/L-MCNC: 244.8 MG/DL
MICROALBUMIN/CREAT 24H UR-RTO: 1187 MG/G
MICROSCOPIC-UA: NORMAL
MONOCYTES # BLD AUTO: 0.42 K/UL
MONOCYTES NFR BLD AUTO: 6.2 %
NEUTROPHILS # BLD AUTO: 4.66 K/UL
NEUTROPHILS NFR BLD AUTO: 69.1 %
NITRITE URINE: NEGATIVE
PARATHYROID HORMONE INTACT: 47 PG/ML
PH URINE: 6
PHOSPHATE SERPL-MCNC: 3.3 MG/DL
PLATELET # BLD AUTO: 158 K/UL
POTASSIUM SERPL-SCNC: 5.3 MMOL/L
PROT SERPL-MCNC: 7.6 G/DL
PROTEIN URINE: ABNORMAL
PSA SERPL-MCNC: 9.16 NG/ML
RBC # BLD: 4.51 M/UL
RBC # FLD: 14.7 %
RED BLOOD CELLS URINE: 1 /HPF
SODIUM SERPL-SCNC: 141 MMOL/L
SPECIFIC GRAVITY URINE: >=1.03
SQUAMOUS EPITHELIAL CELLS: 1 /HPF
TRIGL SERPL-MCNC: 122 MG/DL
URATE SERPL-MCNC: 5.7 MG/DL
UROBILINOGEN URINE: NORMAL
WBC # FLD AUTO: 6.74 K/UL
WHITE BLOOD CELLS URINE: 2 /HPF

## 2020-09-08 ENCOUNTER — RX RENEWAL (OUTPATIENT)
Age: 80
End: 2020-09-08

## 2020-10-28 ENCOUNTER — NON-APPOINTMENT (OUTPATIENT)
Age: 80
End: 2020-10-28

## 2020-10-28 ENCOUNTER — APPOINTMENT (OUTPATIENT)
Dept: INTERNAL MEDICINE | Facility: CLINIC | Age: 80
End: 2020-10-28
Payer: MEDICARE

## 2020-10-28 VITALS
DIASTOLIC BLOOD PRESSURE: 74 MMHG | HEART RATE: 57 BPM | HEIGHT: 68.2 IN | SYSTOLIC BLOOD PRESSURE: 144 MMHG | OXYGEN SATURATION: 98 % | WEIGHT: 164.2 LBS | BODY MASS INDEX: 24.89 KG/M2 | TEMPERATURE: 97.4 F

## 2020-10-28 DIAGNOSIS — M54.2 CERVICALGIA: ICD-10-CM

## 2020-10-28 PROCEDURE — G0444 DEPRESSION SCREEN ANNUAL: CPT | Mod: 59

## 2020-10-28 PROCEDURE — G0439: CPT

## 2020-10-28 PROCEDURE — 93000 ELECTROCARDIOGRAM COMPLETE: CPT | Mod: 59

## 2020-10-28 PROCEDURE — 36415 COLL VENOUS BLD VENIPUNCTURE: CPT

## 2020-10-28 RX ORDER — METHYLPREDNISOLONE 4 MG/1
4 TABLET ORAL
Qty: 1 | Refills: 1 | Status: DISCONTINUED | COMMUNITY
Start: 2019-12-10 | End: 2020-10-28

## 2020-10-28 RX ORDER — METHYLPREDNISOLONE 4 MG/1
4 TABLET ORAL
Qty: 1 | Refills: 1 | Status: DISCONTINUED | COMMUNITY
Start: 2019-07-29 | End: 2020-10-28

## 2020-10-28 NOTE — ASSESSMENT
[FreeTextEntry1] : See health maintenance assessment and plan outlined below.\par In addition:\par Full labs and urine sent today\par Podiatry consult for diabetic foot care\par Ortho f/u for right knee pain\par Had colonoscopy 1/31/2011 - due in 10 years\par Wishes to do FIT testing - states that he will not do colonoscopy again\par Hepatology f/u with  f/u liver US\par Renal f/u\par Urology f/u\par Hematology f/u\par Continue meds, diet, exercise as outlined\par EKG done and reviewed with patient and report scanned\par Cardiology f/u - to do f/u echo there\par CXR declined\par Vaccines d/w patient \par He declined Endocrine evaluation\par Consider neck US\par To see derm, ophtho, dentist, ENT\par RTC for annual physical \par RTC 3-4 months and as needed\par To call for any medical issues\par

## 2020-10-28 NOTE — PHYSICAL EXAM
[No Acute Distress] : no acute distress [Well Nourished] : well nourished [Well Developed] : well developed [Well-Appearing] : well-appearing [Normal Voice/Communication] : normal voice/communication [Normal Sclera/Conjunctiva] : normal sclera/conjunctiva [PERRL] : pupils equal round and reactive to light [EOMI] : extraocular movements intact [Normal Outer Ear/Nose] : the outer ears and nose were normal in appearance [Normal TMs] : both tympanic membranes were normal [No JVD] : no jugular venous distention [Supple] : supple [No Lymphadenopathy] : no lymphadenopathy [Thyroid Normal, No Nodules] : the thyroid was normal and there were no nodules present [No Respiratory Distress] : no respiratory distress  [Clear to Auscultation] : lungs were clear to auscultation bilaterally [No Accessory Muscle Use] : no accessory muscle use [Normal Rate] : normal rate  [Regular Rhythm] : with a regular rhythm [Normal S1, S2] : normal S1 and S2 [No Carotid Bruits] : no carotid bruits [Pedal Pulses Present] : the pedal pulses are present [No Edema] : there was no peripheral edema [No Extremity Clubbing/Cyanosis] : no extremity clubbing/cyanosis [Normal Appearance] : normal in appearance [No Nipple Discharge] : no nipple discharge [No Axillary Lymphadenopathy] : no axillary lymphadenopathy [Soft] : abdomen soft [Non Tender] : non-tender [Non-distended] : non-distended [No Masses] : no abdominal mass palpated [No HSM] : no HSM [Normal Bowel Sounds] : normal bowel sounds [Normal Supraclavicular Nodes] : no supraclavicular lymphadenopathy [Normal Axillary Nodes] : no axillary lymphadenopathy [Normal Posterior Cervical Nodes] : no posterior cervical lymphadenopathy [Normal Anterior Cervical Nodes] : no anterior cervical lymphadenopathy [No CVA Tenderness] : no CVA  tenderness [No Spinal Tenderness] : no spinal tenderness [Scoliosis] : scoliosis [Grossly Normal Strength/Tone] : grossly normal strength/tone [No Rash] : no rash [Normal Gait] : normal gait [Coordination Grossly Intact] : coordination grossly intact [No Focal Deficits] : no focal deficits [Deep Tendon Reflexes (DTR)] : deep tendon reflexes were 2+ and symmetric [Speech Grossly Normal] : speech grossly normal [Normal Affect] : the affect was normal [Alert and Oriented x3] : oriented to person, place, and time [Declined Rectal Exam] : declined rectal exam [de-identified] : no ST; wearing full facemask; right TM blocked with cerumen; left TM clear [de-identified] : no stridor [de-identified] : R=16 [de-identified] : murmur unchanged [de-identified] : no cords; normal radial pulses [de-identified] : As per urology [de-identified] : sees podiatry

## 2020-10-28 NOTE — REVIEW OF SYSTEMS
[Vision Problems] : vision problems [Negative] : Heme/Lymph [Joint Pain] : joint pain [Back Pain] : back pain

## 2020-10-28 NOTE — HEALTH RISK ASSESSMENT
[Good] : ~his/her~  mood as  good [No] : In the past 12 months have you used drugs other than those required for medical reasons? No [No falls in past year] : Patient reported no falls in the past year [0] : 2) Feeling down, depressed, or hopeless: Not at all (0) [No Retinopathy] : No retinopathy [Patient declined colonoscopy] : Patient declined colonoscopy [HIV Test offered] : HIV Test offered [Hepatitis C test offered] : Hepatitis C test offered [Behavioral] : behavioral [With Family] : lives with family [# of Members in Household ___] :  household currently consist of [unfilled] member(s) [Retired] : retired [High School] : high school [] :  [# Of Children ___] : has [unfilled] children [Feels Safe at Home] : Feels safe at home [Fully functional (bathing, dressing, toileting, transferring, walking, feeding)] : Fully functional (bathing, dressing, toileting, transferring, walking, feeding) [Fully functional (using the telephone, shopping, preparing meals, housekeeping, doing laundry, using] : Fully functional and needs no help or supervision to perform IADLs (using the telephone, shopping, preparing meals, housekeeping, doing laundry, using transportation, managing medications and managing finances) [Reports changes in dental health] : Reports changes in dental health [Smoke Detector] : smoke detector [Carbon Monoxide Detector] : carbon monoxide detector [Guns at Home] : guns at home [Seat Belt] :  uses seat belt [With Patient/Caregiver] : With Patient/Caregiver [Designated Healthcare Proxy] : Designated healthcare proxy [Name: ___] : Health Care Proxy's Name: [unfilled]  [Relationship: ___] : Relationship: [unfilled] [FreeTextEntry1] : right neck pain -worried about carotid disease; right knee pain [] : No [de-identified] : none [de-identified] : ophtho; dentist; renal; urology [de-identified] : exercises daily [de-identified] : regular [[id number]] : 0 [EyeExamDate] : 2018 [Change in mental status noted] : No change in mental status noted [Language] : denies difficulty with language [Behavior] : denies difficulty with behavior [Learning/Retaining New Information] : denies difficulty learning/retaining new information [Handling Complex Tasks] : denies difficulty handling complex tasks [Reasoning] : denies difficulty with reasoning [Spatial Ability and Orientation] : denies difficulty with spatial ability and orientation [Sexually Active] : not sexually active [Reports changes in hearing] : Reports no changes in hearing [Reports changes in vision] : Reports no changes in vision [Sunscreen] : does not use sunscreen [Travel to Developing Areas] : does not  travel to developing areas [TB Exposure] : is not being exposed to tuberculosis [Caregiver Concerns] : does not have caregiver concerns [MammogramDate] : NA [PapSmearDate] : NA [BoneDensityDate] : NA [ColonoscopyDate] : 1/2011 [AdvancecareDate] : 10/20

## 2020-10-28 NOTE — HISTORY OF PRESENT ILLNESS
[FreeTextEntry1] : Comes in for annual physical. [de-identified] : Feeling well.\par Denies COVID 19 illness.

## 2020-10-30 LAB
25(OH)D3 SERPL-MCNC: 48.6 NG/ML
ABO + RH PNL BLD: NORMAL
ALBUMIN SERPL ELPH-MCNC: 4.6 G/DL
ALP BLD-CCNC: 65 U/L
ALT SERPL-CCNC: 16 U/L
ANION GAP SERPL CALC-SCNC: 13 MMOL/L
APPEARANCE: CLEAR
AST SERPL-CCNC: 23 U/L
BACTERIA: NEGATIVE
BASOPHILS # BLD AUTO: 0.06 K/UL
BASOPHILS NFR BLD AUTO: 1 %
BILIRUB SERPL-MCNC: 0.4 MG/DL
BILIRUBIN URINE: NEGATIVE
BLOOD URINE: NEGATIVE
BUN SERPL-MCNC: 33 MG/DL
CALCIUM SERPL-MCNC: 9.7 MG/DL
CHLORIDE SERPL-SCNC: 104 MMOL/L
CHOLEST SERPL-MCNC: 138 MG/DL
CO2 SERPL-SCNC: 20 MMOL/L
COLOR: NORMAL
CREAT SERPL-MCNC: 2.44 MG/DL
CREAT SPEC-SCNC: 107 MG/DL
EOSINOPHIL # BLD AUTO: 0.19 K/UL
EOSINOPHIL NFR BLD AUTO: 3.1 %
ESTIMATED AVERAGE GLUCOSE: 123 MG/DL
FOLATE SERPL-MCNC: >20 NG/ML
GLUCOSE QUALITATIVE U: NEGATIVE
GLUCOSE SERPL-MCNC: 108 MG/DL
HBA1C MFR BLD HPLC: 5.9 %
HCT VFR BLD CALC: 40.2 %
HCV AB SER QL: NONREACTIVE
HCV S/CO RATIO: 0.13 S/CO
HDLC SERPL-MCNC: 47 MG/DL
HGB BLD-MCNC: 12.3 G/DL
HIV1+2 AB SPEC QL IA.RAPID: NONREACTIVE
HYALINE CASTS: 2 /LPF
IMM GRANULOCYTES NFR BLD AUTO: 0.3 %
IRON SERPL-MCNC: 78 UG/DL
KETONES URINE: NEGATIVE
LDLC SERPL CALC-MCNC: 69 MG/DL
LEUKOCYTE ESTERASE URINE: NEGATIVE
LYMPHOCYTES # BLD AUTO: 1.26 K/UL
LYMPHOCYTES NFR BLD AUTO: 20.7 %
MAN DIFF?: NORMAL
MCHC RBC-ENTMCNC: 28.8 PG
MCHC RBC-ENTMCNC: 30.6 GM/DL
MCV RBC AUTO: 94.1 FL
MICROALBUMIN 24H UR DL<=1MG/L-MCNC: 175.5 MG/DL
MICROALBUMIN/CREAT 24H UR-RTO: 1648 MG/G
MICROSCOPIC-UA: NORMAL
MONOCYTES # BLD AUTO: 0.56 K/UL
MONOCYTES NFR BLD AUTO: 9.2 %
NEUTROPHILS # BLD AUTO: 4 K/UL
NEUTROPHILS NFR BLD AUTO: 65.7 %
NITRITE URINE: NEGATIVE
NONHDLC SERPL-MCNC: 91 MG/DL
PH URINE: 6
PLATELET # BLD AUTO: 164 K/UL
POTASSIUM SERPL-SCNC: 5.4 MMOL/L
PROT SERPL-MCNC: 7.2 G/DL
PROTEIN URINE: ABNORMAL
PSA SERPL-MCNC: 7.09 NG/ML
RBC # BLD: 4.27 M/UL
RBC # FLD: 15.1 %
RED BLOOD CELLS URINE: 1 /HPF
SARS-COV-2 IGG SERPL IA-ACNC: <0.1 INDEX
SARS-COV-2 IGG SERPL QL IA: NEGATIVE
SODIUM SERPL-SCNC: 137 MMOL/L
SPECIFIC GRAVITY URINE: 1.01
SQUAMOUS EPITHELIAL CELLS: 1 /HPF
TRIGL SERPL-MCNC: 111 MG/DL
TSH SERPL-ACNC: 2.53 UIU/ML
URATE SERPL-MCNC: 6.1 MG/DL
UROBILINOGEN URINE: NORMAL
VIT B12 SERPL-MCNC: 864 PG/ML
WBC # FLD AUTO: 6.09 K/UL
WHITE BLOOD CELLS URINE: 1 /HPF

## 2020-12-01 ENCOUNTER — RX RENEWAL (OUTPATIENT)
Age: 80
End: 2020-12-01

## 2020-12-14 ENCOUNTER — RX RENEWAL (OUTPATIENT)
Age: 80
End: 2020-12-14

## 2020-12-29 ENCOUNTER — RX RENEWAL (OUTPATIENT)
Age: 80
End: 2020-12-29

## 2021-01-08 ENCOUNTER — RX RENEWAL (OUTPATIENT)
Age: 81
End: 2021-01-08

## 2021-02-16 ENCOUNTER — RX RENEWAL (OUTPATIENT)
Age: 81
End: 2021-02-16

## 2021-02-23 ENCOUNTER — APPOINTMENT (OUTPATIENT)
Dept: NEPHROLOGY | Facility: CLINIC | Age: 81
End: 2021-02-23
Payer: MEDICARE

## 2021-02-23 ENCOUNTER — APPOINTMENT (OUTPATIENT)
Dept: INTERNAL MEDICINE | Facility: CLINIC | Age: 81
End: 2021-02-23
Payer: MEDICARE

## 2021-02-23 VITALS
WEIGHT: 170 LBS | HEIGHT: 68 IN | BODY MASS INDEX: 25.76 KG/M2 | OXYGEN SATURATION: 99 % | SYSTOLIC BLOOD PRESSURE: 172 MMHG | HEART RATE: 62 BPM | DIASTOLIC BLOOD PRESSURE: 86 MMHG

## 2021-02-23 VITALS — HEART RATE: 62 BPM | SYSTOLIC BLOOD PRESSURE: 140 MMHG | DIASTOLIC BLOOD PRESSURE: 68 MMHG

## 2021-02-23 VITALS
DIASTOLIC BLOOD PRESSURE: 72 MMHG | HEART RATE: 69 BPM | HEIGHT: 69 IN | BODY MASS INDEX: 24.59 KG/M2 | OXYGEN SATURATION: 98 % | TEMPERATURE: 97.7 F | WEIGHT: 166 LBS | SYSTOLIC BLOOD PRESSURE: 148 MMHG

## 2021-02-23 DIAGNOSIS — N18.30 CHRONIC KIDNEY DISEASE, STAGE 3 UNSPECIFIED: ICD-10-CM

## 2021-02-23 DIAGNOSIS — E87.5 HYPERKALEMIA: ICD-10-CM

## 2021-02-23 PROCEDURE — 36415 COLL VENOUS BLD VENIPUNCTURE: CPT

## 2021-02-23 PROCEDURE — 99214 OFFICE O/P EST MOD 30 MIN: CPT | Mod: 25

## 2021-02-23 PROCEDURE — 99214 OFFICE O/P EST MOD 30 MIN: CPT

## 2021-02-23 NOTE — ASSESSMENT
[FreeTextEntry1] : 80-year-old with history of chronic kidney disease stage III, hypertension, diabetes, MGUS\par Chronic kidney disease stage III: Renal function at baseline.  Will check labs today with Dr Baca\par Mild hypercalcemia: Trend, Check PTH\par Mild hyperkalemia: info about K diet given to pt\par Hypertension: BP mildly elevated but home BP have been stable\par Diabetes: stable\par Proteinuria: Trend -Modest protein diet. Continue ACE. \par Hyperlipidemia: per Dr. Baca\par MGUS: follow with Heme\par Gout: Now on allopurinol. Followup uric acid level.\par 6 months follow-up with me

## 2021-02-23 NOTE — PHYSICAL EXAM
[General Appearance - Alert] : alert [General Appearance - In No Acute Distress] : in no acute distress [Sclera] : the sclera and conjunctiva were normal [Outer Ear] : the ears and nose were normal in appearance [Neck Appearance] : the appearance of the neck was normal [Neck Cervical Mass (___cm)] : no neck mass was observed [Auscultation Breath Sounds / Voice Sounds] : lungs were clear to auscultation bilaterally [Heart Rate And Rhythm] : heart rate was normal and rhythm regular [Heart Sounds] : normal S1 and S2 [Heart Sounds Pericardial Friction Rub] : no pericardial rub [Edema] : there was no peripheral edema [Bowel Sounds] : normal bowel sounds [Abdomen Soft] : soft [Involuntary Movements] : no involuntary movements were seen [] : no rash [No Focal Deficits] : no focal deficits [Oriented To Time, Place, And Person] : oriented to person, place, and time [Affect] : the affect was normal [Mood] : the mood was normal

## 2021-02-23 NOTE — PHYSICAL EXAM
[No Acute Distress] : no acute distress [Well Nourished] : well nourished [Well Developed] : well developed [Well-Appearing] : well-appearing [Normal Voice/Communication] : normal voice/communication [Normal Sclera/Conjunctiva] : normal sclera/conjunctiva [PERRL] : pupils equal round and reactive to light [EOMI] : extraocular movements intact [Normal Outer Ear/Nose] : the outer ears and nose were normal in appearance [Normal TMs] : both tympanic membranes were normal [No JVD] : no jugular venous distention [Supple] : supple [No Respiratory Distress] : no respiratory distress  [Clear to Auscultation] : lungs were clear to auscultation bilaterally [No Accessory Muscle Use] : no accessory muscle use [Normal Rate] : normal rate  [Regular Rhythm] : with a regular rhythm [Normal S1, S2] : normal S1 and S2 [No Carotid Bruits] : no carotid bruits [No Edema] : there was no peripheral edema [No Extremity Clubbing/Cyanosis] : no extremity clubbing/cyanosis [Soft] : abdomen soft [Non Tender] : non-tender [Normal Bowel Sounds] : normal bowel sounds [No CVA Tenderness] : no CVA  tenderness [No Spinal Tenderness] : no spinal tenderness [No Rash] : no rash [Normal Gait] : normal gait [Coordination Grossly Intact] : coordination grossly intact [No Focal Deficits] : no focal deficits [Speech Grossly Normal] : speech grossly normal [Normal Affect] : the affect was normal [Alert and Oriented x3] : oriented to person, place, and time [de-identified] : no ST; wearing full facemask [de-identified] : no stridor [de-identified] : R=16

## 2021-02-23 NOTE — HEALTH RISK ASSESSMENT
[No] : In the past 12 months have you used drugs other than those required for medical reasons? No [No falls in past year] : Patient reported no falls in the past year [0] : 2) Feeling down, depressed, or hopeless: Not at all (0) [] : No [de-identified] : renal [de-identified] : exercises [de-identified] : low salt/low fat/ADA [[id number]] : 0

## 2021-02-23 NOTE — ASSESSMENT
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

## 2021-02-23 NOTE — HISTORY OF PRESENT ILLNESS
[de-identified] : See narrative below. [FreeTextEntry1] : Feeling well.  No f/c/s.\par No edema, orthopnea or PND.\par Normal BM/UO.\par Denies abdominal pain, n/v.\par Denies CP, SOB, cough, hemoptysis.\par Denies palpitations.\par Denies h/a or visual/speech disturbance.\par No new complaints.\par Enjoys his 3 dogs.\par Has seen Renal in f/u today.\par Denies covid illness - trouble scheduling vaccine.

## 2021-02-23 NOTE — HISTORY OF PRESENT ILLNESS
[FreeTextEntry1] : 80-year-old with history of long-standing hypertension, diabetes, chronic kidney disease stage IV here for follow-up. \par He has been doing well.  Staying safe in the pandemic.  He is taking care of 3 dogs- got a Chihuahua named Yenifer\par BP at home have been 120-130/80s

## 2021-02-26 LAB
25(OH)D3 SERPL-MCNC: 42.2 NG/ML
ALBUMIN SERPL ELPH-MCNC: 4.5 G/DL
ALP BLD-CCNC: 71 U/L
ALT SERPL-CCNC: 18 U/L
ANION GAP SERPL CALC-SCNC: 14 MMOL/L
APPEARANCE: CLEAR
AST SERPL-CCNC: 19 U/L
BACTERIA: NEGATIVE
BASOPHILS # BLD AUTO: 0.09 K/UL
BASOPHILS NFR BLD AUTO: 1.3 %
BILIRUB SERPL-MCNC: 0.4 MG/DL
BILIRUBIN URINE: NEGATIVE
BLOOD URINE: NEGATIVE
BUN SERPL-MCNC: 36 MG/DL
CALCIUM SERPL-MCNC: 9.6 MG/DL
CALCIUM SERPL-MCNC: 9.6 MG/DL
CHLORIDE SERPL-SCNC: 104 MMOL/L
CHOLEST SERPL-MCNC: 151 MG/DL
CO2 SERPL-SCNC: 21 MMOL/L
COLOR: YELLOW
CREAT SERPL-MCNC: 2.69 MG/DL
CREAT SPEC-SCNC: 152 MG/DL
EOSINOPHIL # BLD AUTO: 0.13 K/UL
EOSINOPHIL NFR BLD AUTO: 1.9 %
ESTIMATED AVERAGE GLUCOSE: 123 MG/DL
FOLATE SERPL-MCNC: >20 NG/ML
GLUCOSE QUALITATIVE U: NEGATIVE
GLUCOSE SERPL-MCNC: 113 MG/DL
HBA1C MFR BLD HPLC: 5.9 %
HCT VFR BLD CALC: 42.2 %
HDLC SERPL-MCNC: 51 MG/DL
HGB BLD-MCNC: 12.8 G/DL
HYALINE CASTS: 0 /LPF
IMM GRANULOCYTES NFR BLD AUTO: 0.3 %
IRON SERPL-MCNC: 92 UG/DL
KETONES URINE: NEGATIVE
LDLC SERPL CALC-MCNC: 76 MG/DL
LEUKOCYTE ESTERASE URINE: NEGATIVE
LYMPHOCYTES # BLD AUTO: 1.22 K/UL
LYMPHOCYTES NFR BLD AUTO: 18.2 %
M PROTEIN SPEC IFE-MCNC: NORMAL
MAN DIFF?: NORMAL
MCHC RBC-ENTMCNC: 29 PG
MCHC RBC-ENTMCNC: 30.3 GM/DL
MCV RBC AUTO: 95.5 FL
MICROALBUMIN 24H UR DL<=1MG/L-MCNC: 393.4 MG/DL
MICROALBUMIN/CREAT 24H UR-RTO: 2593 MG/G
MICROSCOPIC-UA: NORMAL
MONOCYTES # BLD AUTO: 0.55 K/UL
MONOCYTES NFR BLD AUTO: 8.2 %
NEUTROPHILS # BLD AUTO: 4.71 K/UL
NEUTROPHILS NFR BLD AUTO: 70.1 %
NITRITE URINE: NEGATIVE
NONHDLC SERPL-MCNC: 100 MG/DL
PARATHYROID HORMONE INTACT: 71 PG/ML
PH URINE: 6
PLATELET # BLD AUTO: 165 K/UL
POTASSIUM SERPL-SCNC: 4.7 MMOL/L
PROT SERPL-MCNC: 7.4 G/DL
PROTEIN URINE: ABNORMAL
RBC # BLD: 4.42 M/UL
RBC # FLD: 15.3 %
RED BLOOD CELLS URINE: 1 /HPF
SODIUM SERPL-SCNC: 140 MMOL/L
SPECIFIC GRAVITY URINE: 1.02
SQUAMOUS EPITHELIAL CELLS: 2 /HPF
TRIGL SERPL-MCNC: 118 MG/DL
TSH SERPL-ACNC: 2.6 UIU/ML
URATE SERPL-MCNC: 5.4 MG/DL
UROBILINOGEN URINE: NORMAL
VIT B12 SERPL-MCNC: 858 PG/ML
WBC # FLD AUTO: 6.72 K/UL
WHITE BLOOD CELLS URINE: 2 /HPF

## 2021-03-02 ENCOUNTER — RX RENEWAL (OUTPATIENT)
Age: 81
End: 2021-03-02

## 2021-03-12 ENCOUNTER — OUTPATIENT (OUTPATIENT)
Dept: OUTPATIENT SERVICES | Facility: HOSPITAL | Age: 81
LOS: 1 days | End: 2021-03-12
Payer: MEDICARE

## 2021-03-12 ENCOUNTER — APPOINTMENT (OUTPATIENT)
Dept: ULTRASOUND IMAGING | Facility: IMAGING CENTER | Age: 81
End: 2021-03-12
Payer: MEDICARE

## 2021-03-12 DIAGNOSIS — N18.4 CHRONIC KIDNEY DISEASE, STAGE 4 (SEVERE): ICD-10-CM

## 2021-03-12 DIAGNOSIS — Z98.89 OTHER SPECIFIED POSTPROCEDURAL STATES: Chronic | ICD-10-CM

## 2021-03-12 PROCEDURE — 76770 US EXAM ABDO BACK WALL COMP: CPT | Mod: 26

## 2021-03-12 PROCEDURE — 76770 US EXAM ABDO BACK WALL COMP: CPT

## 2021-03-29 ENCOUNTER — LABORATORY RESULT (OUTPATIENT)
Age: 81
End: 2021-03-29

## 2021-04-01 LAB
ALBUMIN MFR SERPL ELPH: 57 %
ALBUMIN SERPL ELPH-MCNC: 4.4 G/DL
ALBUMIN SERPL-MCNC: 4.2 G/DL
ALBUMIN/GLOB SERPL: 1.4 RATIO
ALPHA1 GLOB MFR SERPL ELPH: 3.7 %
ALPHA1 GLOB SERPL ELPH-MCNC: 0.3 G/DL
ALPHA2 GLOB MFR SERPL ELPH: 13.5 %
ALPHA2 GLOB SERPL ELPH-MCNC: 1 G/DL
ANION GAP SERPL CALC-SCNC: 14 MMOL/L
B-GLOBULIN MFR SERPL ELPH: 10.7 %
B-GLOBULIN SERPL ELPH-MCNC: 0.8 G/DL
BUN SERPL-MCNC: 43 MG/DL
CALCIUM SERPL-MCNC: 10 MG/DL
CHLORIDE SERPL-SCNC: 104 MMOL/L
CO2 SERPL-SCNC: 22 MMOL/L
CREAT SERPL-MCNC: 3.04 MG/DL
CREAT SPEC-SCNC: 136 MG/DL
CREAT SPEC-SCNC: 136 MG/DL
CREAT/PROT UR: 2.9 RATIO
DEPRECATED KAPPA LC FREE/LAMBDA SER: 1.57 RATIO
DEPRECATED KAPPA LC FREE/LAMBDA SER: 1.57 RATIO
GAMMA GLOB FLD ELPH-MCNC: 1.1 G/DL
GAMMA GLOB MFR SERPL ELPH: 15.1 %
GLUCOSE SERPL-MCNC: 128 MG/DL
IGA SER QL IEP: 196 MG/DL
IGG SER QL IEP: 1159 MG/DL
IGM SER QL IEP: 65 MG/DL
INTERPRETATION SERPL IEP-IMP: NORMAL
KAPPA LC CSF-MCNC: 2.67 MG/DL
KAPPA LC CSF-MCNC: 2.67 MG/DL
KAPPA LC SERPL-MCNC: 4.18 MG/DL
KAPPA LC SERPL-MCNC: 4.18 MG/DL
M PROTEIN MFR SERPL ELPH: 4.7 %
M PROTEIN SPEC IFE-MCNC: NORMAL
MICROALBUMIN 24H UR DL<=1MG/L-MCNC: 243.3 MG/DL
MICROALBUMIN/CREAT 24H UR-RTO: 1794 MG/G
MONOCLON BAND OBS SERPL: 0.3 G/DL
PHOSPHATE SERPL-MCNC: 4 MG/DL
POTASSIUM SERPL-SCNC: 5 MMOL/L
PROT SERPL-MCNC: 7.3 G/DL
PROT SERPL-MCNC: 7.3 G/DL
PROT UR-MCNC: 387 MG/DL
SODIUM SERPL-SCNC: 141 MMOL/L

## 2021-04-02 LAB — KAPPA LC 24H UR QL: NORMAL

## 2021-04-08 ENCOUNTER — LABORATORY RESULT (OUTPATIENT)
Age: 81
End: 2021-04-08

## 2021-04-09 LAB
APTT BLD: 36.3 SEC
BASOPHILS # BLD AUTO: 0.04 K/UL
BASOPHILS NFR BLD AUTO: 0.6 %
C3 SERPL-MCNC: 134 MG/DL
C4 SERPL-MCNC: 33 MG/DL
EOSINOPHIL # BLD AUTO: 0.16 K/UL
EOSINOPHIL NFR BLD AUTO: 2.3 %
HCT VFR BLD CALC: 39.5 %
HGB BLD-MCNC: 12.1 G/DL
IMM GRANULOCYTES NFR BLD AUTO: 0.4 %
INR PPP: 1 RATIO
LYMPHOCYTES # BLD AUTO: 1.93 K/UL
LYMPHOCYTES NFR BLD AUTO: 27.4 %
MAN DIFF?: NORMAL
MCHC RBC-ENTMCNC: 28.7 PG
MCHC RBC-ENTMCNC: 30.6 GM/DL
MCV RBC AUTO: 93.6 FL
MONOCYTES # BLD AUTO: 0.62 K/UL
MONOCYTES NFR BLD AUTO: 8.8 %
NEUTROPHILS # BLD AUTO: 4.26 K/UL
NEUTROPHILS NFR BLD AUTO: 60.5 %
PLATELET # BLD AUTO: 166 K/UL
PT BLD: 11.9 SEC
RBC # BLD: 4.22 M/UL
RBC # FLD: 15 %
WBC # FLD AUTO: 7.04 K/UL

## 2021-04-10 LAB
DSDNA AB SER-ACNC: <12 IU/ML
MPO AB + PR3 PNL SER: NORMAL

## 2021-04-12 LAB
ANA SER IF-ACNC: NEGATIVE
GBM AB TITR SER IF: 7

## 2021-04-13 LAB — PHOSPHOLIPASE A2 RECEPTOR ELISA: <1.8 RU/ML

## 2021-04-27 ENCOUNTER — OUTPATIENT (OUTPATIENT)
Dept: OUTPATIENT SERVICES | Facility: HOSPITAL | Age: 81
LOS: 1 days | End: 2021-04-27
Payer: MEDICARE

## 2021-04-27 DIAGNOSIS — Z98.89 OTHER SPECIFIED POSTPROCEDURAL STATES: Chronic | ICD-10-CM

## 2021-04-27 DIAGNOSIS — Z11.52 ENCOUNTER FOR SCREENING FOR COVID-19: ICD-10-CM

## 2021-04-27 LAB — SARS-COV-2 RNA SPEC QL NAA+PROBE: SIGNIFICANT CHANGE UP

## 2021-04-27 PROCEDURE — U0005: CPT

## 2021-04-27 PROCEDURE — U0003: CPT

## 2021-04-27 PROCEDURE — C9803: CPT

## 2021-04-30 ENCOUNTER — RESULT REVIEW (OUTPATIENT)
Age: 81
End: 2021-04-30

## 2021-04-30 ENCOUNTER — APPOINTMENT (OUTPATIENT)
Dept: ULTRASOUND IMAGING | Facility: HOSPITAL | Age: 81
End: 2021-04-30
Payer: MEDICARE

## 2021-04-30 ENCOUNTER — OUTPATIENT (OUTPATIENT)
Dept: OUTPATIENT SERVICES | Facility: HOSPITAL | Age: 81
LOS: 1 days | End: 2021-04-30
Payer: MEDICARE

## 2021-04-30 DIAGNOSIS — N17.9 ACUTE KIDNEY FAILURE, UNSPECIFIED: ICD-10-CM

## 2021-04-30 DIAGNOSIS — Z00.00 ENCOUNTER FOR GENERAL ADULT MEDICAL EXAMINATION WITHOUT ABNORMAL FINDINGS: ICD-10-CM

## 2021-04-30 DIAGNOSIS — Z98.89 OTHER SPECIFIED POSTPROCEDURAL STATES: Chronic | ICD-10-CM

## 2021-04-30 DIAGNOSIS — N18.4 CHRONIC KIDNEY DISEASE, STAGE 4 (SEVERE): ICD-10-CM

## 2021-04-30 PROCEDURE — 76942 ECHO GUIDE FOR BIOPSY: CPT | Mod: 26

## 2021-04-30 PROCEDURE — 88305 TISSUE EXAM BY PATHOLOGIST: CPT

## 2021-04-30 PROCEDURE — 50200 RENAL BIOPSY PERQ: CPT

## 2021-04-30 PROCEDURE — 88350 IMFLUOR EA ADDL 1ANTB STN PX: CPT

## 2021-04-30 PROCEDURE — 88305 TISSUE EXAM BY PATHOLOGIST: CPT | Mod: 26

## 2021-04-30 PROCEDURE — 88346 IMFLUOR 1ST 1ANTB STAIN PX: CPT | Mod: 26

## 2021-04-30 PROCEDURE — 88346 IMFLUOR 1ST 1ANTB STAIN PX: CPT

## 2021-04-30 PROCEDURE — 88348 ELECTRON MICROSCOPY DX: CPT

## 2021-04-30 PROCEDURE — 88313 SPECIAL STAINS GROUP 2: CPT

## 2021-04-30 PROCEDURE — 76942 ECHO GUIDE FOR BIOPSY: CPT

## 2021-04-30 PROCEDURE — 88313 SPECIAL STAINS GROUP 2: CPT | Mod: 26

## 2021-04-30 PROCEDURE — 88312 SPECIAL STAINS GROUP 1: CPT | Mod: 26

## 2021-04-30 PROCEDURE — 88312 SPECIAL STAINS GROUP 1: CPT

## 2021-04-30 PROCEDURE — 88348 ELECTRON MICROSCOPY DX: CPT | Mod: 26

## 2021-04-30 PROCEDURE — 88350 IMFLUOR EA ADDL 1ANTB STN PX: CPT | Mod: 26

## 2021-04-30 PROCEDURE — 50200 RENAL BIOPSY PERQ: CPT | Mod: RT

## 2021-05-04 LAB — SURGICAL PATHOLOGY STUDY: SIGNIFICANT CHANGE UP

## 2021-05-24 ENCOUNTER — RX RENEWAL (OUTPATIENT)
Age: 81
End: 2021-05-24

## 2021-06-08 ENCOUNTER — RX RENEWAL (OUTPATIENT)
Age: 81
End: 2021-06-08

## 2021-06-22 ENCOUNTER — APPOINTMENT (OUTPATIENT)
Dept: INTERNAL MEDICINE | Facility: CLINIC | Age: 81
End: 2021-06-22
Payer: MEDICARE

## 2021-06-22 VITALS
WEIGHT: 164 LBS | HEIGHT: 69 IN | BODY MASS INDEX: 24.29 KG/M2 | HEART RATE: 66 BPM | DIASTOLIC BLOOD PRESSURE: 84 MMHG | SYSTOLIC BLOOD PRESSURE: 130 MMHG | TEMPERATURE: 98.2 F | OXYGEN SATURATION: 99 %

## 2021-06-22 DIAGNOSIS — D47.2 MONOCLONAL GAMMOPATHY: ICD-10-CM

## 2021-06-22 PROCEDURE — 36415 COLL VENOUS BLD VENIPUNCTURE: CPT

## 2021-06-22 PROCEDURE — 99214 OFFICE O/P EST MOD 30 MIN: CPT | Mod: 25

## 2021-06-22 NOTE — ASSESSMENT
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

## 2021-06-22 NOTE — PHYSICAL EXAM
[No Acute Distress] : no acute distress [Well Nourished] : well nourished [Well Developed] : well developed [Well-Appearing] : well-appearing [Normal Voice/Communication] : normal voice/communication [Normal Sclera/Conjunctiva] : normal sclera/conjunctiva [PERRL] : pupils equal round and reactive to light [EOMI] : extraocular movements intact [Normal Outer Ear/Nose] : the outer ears and nose were normal in appearance [Normal TMs] : both tympanic membranes were normal [No JVD] : no jugular venous distention [Supple] : supple [No Respiratory Distress] : no respiratory distress  [Clear to Auscultation] : lungs were clear to auscultation bilaterally [No Accessory Muscle Use] : no accessory muscle use [Normal Rate] : normal rate  [Regular Rhythm] : with a regular rhythm [Normal S1, S2] : normal S1 and S2 [No Carotid Bruits] : no carotid bruits [No Edema] : there was no peripheral edema [No Extremity Clubbing/Cyanosis] : no extremity clubbing/cyanosis [Soft] : abdomen soft [Non Tender] : non-tender [Normal Bowel Sounds] : normal bowel sounds [No CVA Tenderness] : no CVA  tenderness [No Spinal Tenderness] : no spinal tenderness [No Rash] : no rash [Normal Gait] : normal gait [Coordination Grossly Intact] : coordination grossly intact [No Focal Deficits] : no focal deficits [Speech Grossly Normal] : speech grossly normal [Normal Affect] : the affect was normal [Alert and Oriented x3] : oriented to person, place, and time [de-identified] : no ST; wearing full facemask [de-identified] : no stridor [de-identified] : R=16

## 2021-06-22 NOTE — HISTORY OF PRESENT ILLNESS
[de-identified] : See narrative below. [FreeTextEntry1] : Feeling well.  No f/c/s.\par No edema, orthopnea or PND.\par Normal BM/UO.\par Denies abdominal pain, n/v.\par Denies CP, SOB, cough, hemoptysis.\par Denies palpitations.\par Denies h/a or visual/speech disturbance.\par No new complaints.\par Does notice increased fatigue after activities such as dog-walking or gardening.\par Must either nap after activity or spread out the amount of activity done in one sitting.\par Denies covid illness. Had both Pfizer vaccines.\par Recently had renal biopsy.\par On low potassium diet as per Dr. Holloway.

## 2021-06-22 NOTE — HEALTH RISK ASSESSMENT
[No] : In the past 12 months have you used drugs other than those required for medical reasons? No [No falls in past year] : Patient reported no falls in the past year [0] : 2) Feeling down, depressed, or hopeless: Not at all (0) [] : No [de-identified] : renal [de-identified] : exercises [de-identified] : low salt/low fat/ADA/ low potassium [[id number]] : 0 [Reviewed no changes] : Reviewed no changes [AdvancecareDate] : 06/21

## 2021-06-23 ENCOUNTER — RX RENEWAL (OUTPATIENT)
Age: 81
End: 2021-06-23

## 2021-06-25 LAB
25(OH)D3 SERPL-MCNC: 48.8 NG/ML
ALBUMIN SERPL ELPH-MCNC: 4.4 G/DL
ALP BLD-CCNC: 80 U/L
ALT SERPL-CCNC: 15 U/L
ANION GAP SERPL CALC-SCNC: 12 MMOL/L
APPEARANCE: CLEAR
AST SERPL-CCNC: 17 U/L
BACTERIA: NEGATIVE
BASOPHILS # BLD AUTO: 0.05 K/UL
BASOPHILS NFR BLD AUTO: 0.9 %
BILIRUB SERPL-MCNC: 0.3 MG/DL
BILIRUBIN URINE: NEGATIVE
BLOOD URINE: NEGATIVE
BUN SERPL-MCNC: 34 MG/DL
CALCIUM SERPL-MCNC: 9.5 MG/DL
CALCIUM SERPL-MCNC: 9.5 MG/DL
CHLORIDE SERPL-SCNC: 105 MMOL/L
CHOLEST SERPL-MCNC: 121 MG/DL
CO2 SERPL-SCNC: 22 MMOL/L
COLOR: YELLOW
CREAT SERPL-MCNC: 3.09 MG/DL
CREAT SPEC-SCNC: 145 MG/DL
EOSINOPHIL # BLD AUTO: 0.17 K/UL
EOSINOPHIL NFR BLD AUTO: 2.9 %
ESTIMATED AVERAGE GLUCOSE: 120 MG/DL
FOLATE SERPL-MCNC: >20 NG/ML
GLUCOSE QUALITATIVE U: NEGATIVE
GLUCOSE SERPL-MCNC: 109 MG/DL
HBA1C MFR BLD HPLC: 5.8 %
HCT VFR BLD CALC: 39.1 %
HDLC SERPL-MCNC: 47 MG/DL
HGB BLD-MCNC: 11.9 G/DL
HYALINE CASTS: 2 /LPF
IMM GRANULOCYTES NFR BLD AUTO: 0.5 %
IRON SERPL-MCNC: 63 UG/DL
KETONES URINE: NEGATIVE
LDLC SERPL CALC-MCNC: 55 MG/DL
LEUKOCYTE ESTERASE URINE: NEGATIVE
LYMPHOCYTES # BLD AUTO: 1.14 K/UL
LYMPHOCYTES NFR BLD AUTO: 19.7 %
MAGNESIUM SERPL-MCNC: 2.5 MG/DL
MAN DIFF?: NORMAL
MCHC RBC-ENTMCNC: 29 PG
MCHC RBC-ENTMCNC: 30.4 GM/DL
MCV RBC AUTO: 95.1 FL
MICROALBUMIN 24H UR DL<=1MG/L-MCNC: 176.3 MG/DL
MICROALBUMIN/CREAT 24H UR-RTO: 1215 MG/G
MICROSCOPIC-UA: NORMAL
MONOCYTES # BLD AUTO: 0.5 K/UL
MONOCYTES NFR BLD AUTO: 8.6 %
NEUTROPHILS # BLD AUTO: 3.9 K/UL
NEUTROPHILS NFR BLD AUTO: 67.4 %
NITRITE URINE: NEGATIVE
NONHDLC SERPL-MCNC: 74 MG/DL
PARATHYROID HORMONE INTACT: 92 PG/ML
PH URINE: 6
PHOSPHATE SERPL-MCNC: 3 MG/DL
PLATELET # BLD AUTO: 168 K/UL
POTASSIUM SERPL-SCNC: 4.9 MMOL/L
PROT SERPL-MCNC: 7 G/DL
PROTEIN URINE: ABNORMAL
RBC # BLD: 4.11 M/UL
RBC # FLD: 15.5 %
RED BLOOD CELLS URINE: 0 /HPF
SODIUM SERPL-SCNC: 139 MMOL/L
SPECIFIC GRAVITY URINE: 1.02
SQUAMOUS EPITHELIAL CELLS: 1 /HPF
TRIGL SERPL-MCNC: 94 MG/DL
TSH SERPL-ACNC: 2.33 UIU/ML
URATE SERPL-MCNC: 6.1 MG/DL
UROBILINOGEN URINE: NORMAL
VIT B12 SERPL-MCNC: 822 PG/ML
WBC # FLD AUTO: 5.79 K/UL
WHITE BLOOD CELLS URINE: 2 /HPF

## 2021-08-10 ENCOUNTER — APPOINTMENT (OUTPATIENT)
Dept: NEPHROLOGY | Facility: CLINIC | Age: 81
End: 2021-08-10
Payer: MEDICARE

## 2021-08-10 PROCEDURE — 99214 OFFICE O/P EST MOD 30 MIN: CPT | Mod: 95

## 2021-08-10 NOTE — ASSESSMENT
[FreeTextEntry1] : 81-year-old with history of chronic kidney disease stage IV, hypertension, diabetes, MGUS\par Chronic kidney disease stage IV: Renal function progressive. Will have him go for repeat labs at [facility]\par Discussed his degree of CKD and the concerns I have for the progressive nature of his CKD\par Anemia: check hb again to trend CBC\par Mild hypercalcemia: Trend PTH ok\par Mild hyperkalemia: info about K diet given to pt\par Hypertension: BP have been stable\par Diabetes: stable\par Proteinuria: Trend -Modest protein diet. Continue ACE. \par Hyperlipidemia: per Dr. Baca\par MGUS: follow with Heme\par Gout: Now on allopurinol. No gout\par 4 months follow-up with me\par Agreeable to healthy transition program

## 2021-08-10 NOTE — HISTORY OF PRESENT ILLNESS
[Home] : at home, [unfilled] , at the time of the visit. [Medical Office: ([facility])___] : at the medical office located in  [Verbal consent obtained from patient] : the patient, [unfilled] [FreeTextEntry1] : 81-year-old with history of long-standing hypertension, diabetes, chronic kidney disease stage IV on a telehealth for follow-up. \par The video failed to function on his end.\par He had his renal bx which we discussed in April with 50% global sclerosis, HTN adaptive changes\par He has been feeling fatigued.\par Not working out as much. He is not depressed \par He is still with dogs\par -130/80s pulse 58\par Wt 164

## 2021-08-16 ENCOUNTER — RX RENEWAL (OUTPATIENT)
Age: 81
End: 2021-08-16

## 2021-08-16 ENCOUNTER — LABORATORY RESULT (OUTPATIENT)
Age: 81
End: 2021-08-16

## 2021-08-24 LAB
ALBUMIN SERPL ELPH-MCNC: 4.6 G/DL
ANION GAP SERPL CALC-SCNC: 11 MMOL/L
BASOPHILS # BLD AUTO: 0.04 K/UL
BASOPHILS NFR BLD AUTO: 0.6 %
BUN SERPL-MCNC: 55 MG/DL
CALCIUM SERPL-MCNC: 9.5 MG/DL
CHLORIDE SERPL-SCNC: 104 MMOL/L
CO2 SERPL-SCNC: 22 MMOL/L
CREAT SERPL-MCNC: 3.47 MG/DL
DEPRECATED KAPPA LC FREE/LAMBDA SER: 1.55 RATIO
EOSINOPHIL # BLD AUTO: 0.1 K/UL
EOSINOPHIL NFR BLD AUTO: 1.6 %
GLUCOSE SERPL-MCNC: 107 MG/DL
HBV CORE IGG+IGM SER QL: REACTIVE
HBV SURFACE AB SER QL: REACTIVE
HBV SURFACE AG SER QL: NONREACTIVE
HCT VFR BLD CALC: 38.3 %
HCV AB SER QL: NONREACTIVE
HCV S/CO RATIO: 0.15 S/CO
HGB BLD-MCNC: 11.9 G/DL
IMM GRANULOCYTES NFR BLD AUTO: 0.3 %
KAPPA LC CSF-MCNC: 3.19 MG/DL
KAPPA LC SERPL-MCNC: 4.95 MG/DL
LYMPHOCYTES # BLD AUTO: 1.74 K/UL
LYMPHOCYTES NFR BLD AUTO: 28.2 %
M PROTEIN SPEC IFE-MCNC: NORMAL
MAN DIFF?: NORMAL
MCHC RBC-ENTMCNC: 29 PG
MCHC RBC-ENTMCNC: 31.1 GM/DL
MCV RBC AUTO: 93.4 FL
MONOCYTES # BLD AUTO: 0.52 K/UL
MONOCYTES NFR BLD AUTO: 8.4 %
NEUTROPHILS # BLD AUTO: 3.76 K/UL
NEUTROPHILS NFR BLD AUTO: 60.9 %
PHOSPHATE SERPL-MCNC: 4 MG/DL
PLATELET # BLD AUTO: 163 K/UL
POTASSIUM SERPL-SCNC: 5.4 MMOL/L
RBC # BLD: 4.1 M/UL
RBC # FLD: 14.7 %
SODIUM SERPL-SCNC: 137 MMOL/L
URATE SERPL-MCNC: 6.2 MG/DL
WBC # FLD AUTO: 6.18 K/UL

## 2021-09-02 ENCOUNTER — RX RENEWAL (OUTPATIENT)
Age: 81
End: 2021-09-02

## 2021-09-07 ENCOUNTER — RX RENEWAL (OUTPATIENT)
Age: 81
End: 2021-09-07

## 2021-09-07 ENCOUNTER — NON-APPOINTMENT (OUTPATIENT)
Age: 81
End: 2021-09-07

## 2021-09-07 ENCOUNTER — APPOINTMENT (OUTPATIENT)
Dept: CARDIOLOGY | Facility: CLINIC | Age: 81
End: 2021-09-07
Payer: MEDICARE

## 2021-09-07 VITALS
DIASTOLIC BLOOD PRESSURE: 74 MMHG | WEIGHT: 160 LBS | SYSTOLIC BLOOD PRESSURE: 152 MMHG | HEART RATE: 62 BPM | HEIGHT: 69 IN | OXYGEN SATURATION: 100 % | BODY MASS INDEX: 23.7 KG/M2

## 2021-09-07 VITALS — SYSTOLIC BLOOD PRESSURE: 140 MMHG | DIASTOLIC BLOOD PRESSURE: 78 MMHG

## 2021-09-07 PROCEDURE — 93000 ELECTROCARDIOGRAM COMPLETE: CPT

## 2021-09-07 PROCEDURE — 99204 OFFICE O/P NEW MOD 45 MIN: CPT

## 2021-09-09 LAB
ALBUMIN SERPL ELPH-MCNC: 4.6 G/DL
ANION GAP SERPL CALC-SCNC: 11 MMOL/L
BUN SERPL-MCNC: 35 MG/DL
CALCIUM SERPL-MCNC: 9.6 MG/DL
CHLORIDE SERPL-SCNC: 104 MMOL/L
CO2 SERPL-SCNC: 26 MMOL/L
CREAT SERPL-MCNC: 2.96 MG/DL
GLUCOSE SERPL-MCNC: 107 MG/DL
PHOSPHATE SERPL-MCNC: 2.6 MG/DL
POTASSIUM SERPL-SCNC: 4.2 MMOL/L
SODIUM SERPL-SCNC: 141 MMOL/L

## 2021-09-11 NOTE — PHYSICAL EXAM
[Well Developed] : well developed [Well Nourished] : well nourished [No Acute Distress] : no acute distress [Normal Venous Pressure] : normal venous pressure [Normal Conjunctiva] : normal conjunctiva [No Carotid Bruit] : no carotid bruit [Normal S1, S2] : normal S1, S2 [No Murmur] : no murmur [No Rub] : no rub [No Gallop] : no gallop [Clear Lung Fields] : clear lung fields [Good Air Entry] : good air entry [No Respiratory Distress] : no respiratory distress  [Soft] : abdomen soft [Non Tender] : non-tender [No Masses/organomegaly] : no masses/organomegaly [Normal Bowel Sounds] : normal bowel sounds [Normal Gait] : normal gait [No Edema] : no edema [No Cyanosis] : no cyanosis [No Clubbing] : no clubbing [No Varicosities] : no varicosities [No Rash] : no rash [No Skin Lesions] : no skin lesions [Moves all extremities] : moves all extremities [No Focal Deficits] : no focal deficits [Normal Speech] : normal speech [Normal memory] : normal memory [Alert and Oriented] : alert and oriented

## 2021-09-11 NOTE — HISTORY OF PRESENT ILLNESS
[FreeTextEntry1] : Albert is a 81-year-old gentleman hypertension, CKD who presents for evaluation. He had htn from young age. BP was 109 the other day with weakness. Normally 130s. Nephrologist told him to decrease amlodipine to once a day.

## 2021-09-13 ENCOUNTER — RX RENEWAL (OUTPATIENT)
Age: 81
End: 2021-09-13

## 2021-09-14 ENCOUNTER — APPOINTMENT (OUTPATIENT)
Dept: CARDIOLOGY | Facility: CLINIC | Age: 81
End: 2021-09-14

## 2021-09-16 ENCOUNTER — APPOINTMENT (OUTPATIENT)
Dept: CARDIOLOGY | Facility: CLINIC | Age: 81
End: 2021-09-16
Payer: MEDICARE

## 2021-09-16 PROCEDURE — 93306 TTE W/DOPPLER COMPLETE: CPT

## 2021-10-26 ENCOUNTER — APPOINTMENT (OUTPATIENT)
Dept: INTERNAL MEDICINE | Facility: CLINIC | Age: 81
End: 2021-10-26
Payer: MEDICARE

## 2021-10-26 VITALS
TEMPERATURE: 96.26 F | DIASTOLIC BLOOD PRESSURE: 74 MMHG | WEIGHT: 159 LBS | HEART RATE: 62 BPM | SYSTOLIC BLOOD PRESSURE: 132 MMHG | OXYGEN SATURATION: 97 % | HEIGHT: 68.5 IN | BODY MASS INDEX: 23.82 KG/M2

## 2021-10-26 PROCEDURE — 90662 IIV NO PRSV INCREASED AG IM: CPT

## 2021-10-26 PROCEDURE — G0444 DEPRESSION SCREEN ANNUAL: CPT | Mod: 59

## 2021-10-26 PROCEDURE — G0439: CPT

## 2021-10-26 PROCEDURE — G0008: CPT

## 2021-10-26 NOTE — CURRENT MEDS
[Takes medication as prescribed] : takes [None] : Patient does not have any barriers to medication adherence [Yes] : Reviewed medication list for presence of high-risk medications. depression

## 2021-10-26 NOTE — HISTORY OF PRESENT ILLNESS
[FreeTextEntry1] : Comes in for annual physical. [de-identified] : Feeling well.\par Denies COVID 19 illness.\par Was vaccinated.

## 2021-10-26 NOTE — ASSESSMENT
[FreeTextEntry1] : See health maintenance assessment and plan outlined below.\par In addition:\par Full labs and urine testing done today\par Podiatry consult for diabetic foot care\par Ortho f/u as needed\par Had colonoscopy 1/31/2011 - due in 10 years =2021 = advised and he refuses further colonoscopy or FIT testing\par Hepatology f/u with  f/u liver US 2021\par Renal f/u 2021\par Urology f/u 2021\par Hematology f/u 2021\par Continue meds, diet, exercise as outlined\par EKG declined here today as just had with Dr. Garcia\par Cardiology f/u 2021\par CXR declined\par Vaccines d/w patient \par He declined Endocrine evaluation\par To see ENT 2021 for cerumen removal\par To see derm, ophtho, dentist 2021\par RTC for annual physical \par RTC 3-4 months and as needed\par To call for any medical issues\par All of the above was discussed in detail with the patient and all of his questions were answered\par He verbally confirmed understanding of all of the above and agreement with the above plan

## 2021-10-26 NOTE — PHYSICAL EXAM
[No Acute Distress] : no acute distress [Well Nourished] : well nourished [Well Developed] : well developed [Well-Appearing] : well-appearing [Normal Voice/Communication] : normal voice/communication [Normal Sclera/Conjunctiva] : normal sclera/conjunctiva [PERRL] : pupils equal round and reactive to light [EOMI] : extraocular movements intact [Normal Outer Ear/Nose] : the outer ears and nose were normal in appearance [No JVD] : no jugular venous distention [Supple] : supple [No Lymphadenopathy] : no lymphadenopathy [No Respiratory Distress] : no respiratory distress  [Clear to Auscultation] : lungs were clear to auscultation bilaterally [No Accessory Muscle Use] : no accessory muscle use [Normal Rate] : normal rate  [Regular Rhythm] : with a regular rhythm [Normal S1, S2] : normal S1 and S2 [No Carotid Bruits] : no carotid bruits [Pedal Pulses Present] : the pedal pulses are present [No Edema] : there was no peripheral edema [No Extremity Clubbing/Cyanosis] : no extremity clubbing/cyanosis [Normal Appearance] : normal in appearance [No Nipple Discharge] : no nipple discharge [No Axillary Lymphadenopathy] : no axillary lymphadenopathy [Soft] : abdomen soft [Non Tender] : non-tender [Non-distended] : non-distended [No Masses] : no abdominal mass palpated [No HSM] : no HSM [Normal Bowel Sounds] : normal bowel sounds [Declined Rectal Exam] : declined rectal exam [Normal Supraclavicular Nodes] : no supraclavicular lymphadenopathy [Normal Axillary Nodes] : no axillary lymphadenopathy [Normal Posterior Cervical Nodes] : no posterior cervical lymphadenopathy [Normal Anterior Cervical Nodes] : no anterior cervical lymphadenopathy [No CVA Tenderness] : no CVA  tenderness [No Spinal Tenderness] : no spinal tenderness [Scoliosis] : scoliosis [Grossly Normal Strength/Tone] : grossly normal strength/tone [No Rash] : no rash [Normal Gait] : normal gait [Coordination Grossly Intact] : coordination grossly intact [No Focal Deficits] : no focal deficits [Speech Grossly Normal] : speech grossly normal [Normal Affect] : the affect was normal [Alert and Oriented x3] : oriented to person, place, and time [de-identified] : no ST; wearing full facemask; right TM blocked with cerumen; left TM clear [de-identified] : no stridor or TM [de-identified] : R=16 [de-identified] : murmur unchanged [de-identified] : no cords; normal radial pulses [de-identified] : As per urology [de-identified] : sees podiatry

## 2021-10-26 NOTE — HEALTH RISK ASSESSMENT
[Good] : ~his/her~ current health as good [No] : In the past 12 months have you used drugs other than those required for medical reasons? No [No falls in past year] : Patient reported no falls in the past year [0] : 2) Feeling down, depressed, or hopeless: Not at all (0) [Patient declined colonoscopy] : Patient declined colonoscopy [HIV Test offered] : HIV Test offered [Hepatitis C test offered] : Hepatitis C test offered [Behavioral] : behavioral [Retired] : retired [High School] : high school [] :  [# Of Children ___] : has [unfilled] children [Feels Safe at Home] : Feels safe at home [Fully functional (bathing, dressing, toileting, transferring, walking, feeding)] : Fully functional (bathing, dressing, toileting, transferring, walking, feeding) [Fully functional (using the telephone, shopping, preparing meals, housekeeping, doing laundry, using] : Fully functional and needs no help or supervision to perform IADLs (using the telephone, shopping, preparing meals, housekeeping, doing laundry, using transportation, managing medications and managing finances) [Smoke Detector] : smoke detector [Carbon Monoxide Detector] : carbon monoxide detector [Guns at Home] : guns at home [Seat Belt] :  uses seat belt [Very Good] : ~his/her~  mood as very good [PHQ-2 Negative - No further assessment needed] : PHQ-2 Negative - No further assessment needed [Patient declined Retinal Exam] : Patient declined Retinal Exam. [Alone] : lives alone [# of Members in Household ___] :  household currently consist of [unfilled] member(s) [With Patient/Caregiver] : , with patient/caregiver [Designated Healthcare Proxy] : Designated healthcare proxy [Name: ___] : Health Care Proxy's Name: [unfilled]  [Relationship: ___] : Relationship: [unfilled] [FreeTextEntry1] : none [] : No [de-identified] : none [de-identified] : renal, cardiology [de-identified] : exercises daily [de-identified] : regular [[id number]] : 0 [Change in mental status noted] : No change in mental status noted [Language] : denies difficulty with language [Behavior] : denies difficulty with behavior [Learning/Retaining New Information] : denies difficulty learning/retaining new information [Handling Complex Tasks] : denies difficulty handling complex tasks [Reasoning] : denies difficulty with reasoning [Spatial Ability and Orientation] : denies difficulty with spatial ability and orientation [Reports changes in hearing] : Reports no changes in hearing [Reports changes in vision] : Reports no changes in vision [Reports changes in dental health] : Reports no changes in dental health [Sunscreen] : does not use sunscreen [Travel to Developing Areas] : does not  travel to developing areas [TB Exposure] : is not being exposed to tuberculosis [Caregiver Concerns] : does not have caregiver concerns [MammogramDate] : NA [PapSmearDate] : NA [ColonoscopyDate] : 1/2011 [BoneDensityDate] : NA [AdvancecareDate] : 10/21

## 2021-10-27 ENCOUNTER — RX RENEWAL (OUTPATIENT)
Age: 81
End: 2021-10-27

## 2021-10-29 LAB
25(OH)D3 SERPL-MCNC: 51.1 NG/ML
ALBUMIN SERPL ELPH-MCNC: 4.6 G/DL
ALP BLD-CCNC: 77 U/L
ALT SERPL-CCNC: 14 U/L
ANION GAP SERPL CALC-SCNC: 13 MMOL/L
APPEARANCE: CLEAR
AST SERPL-CCNC: 18 U/L
BACTERIA: NEGATIVE
BASOPHILS # BLD AUTO: 0.08 K/UL
BASOPHILS NFR BLD AUTO: 1.2 %
BILIRUB SERPL-MCNC: 0.4 MG/DL
BILIRUBIN URINE: NEGATIVE
BLOOD URINE: NEGATIVE
BUN SERPL-MCNC: 45 MG/DL
CALCIUM SERPL-MCNC: 9.9 MG/DL
CALCIUM SERPL-MCNC: 9.9 MG/DL
CHLORIDE SERPL-SCNC: 104 MMOL/L
CHOLEST SERPL-MCNC: 158 MG/DL
CO2 SERPL-SCNC: 23 MMOL/L
COLOR: NORMAL
CREAT SERPL-MCNC: 3.21 MG/DL
CREAT SPEC-SCNC: 60 MG/DL
EOSINOPHIL # BLD AUTO: 0.16 K/UL
EOSINOPHIL NFR BLD AUTO: 2.4 %
ESTIMATED AVERAGE GLUCOSE: 111 MG/DL
FOLATE SERPL-MCNC: >20 NG/ML
GLUCOSE QUALITATIVE U: NEGATIVE
GLUCOSE SERPL-MCNC: 116 MG/DL
HBA1C MFR BLD HPLC: 5.5 %
HCT VFR BLD CALC: 38 %
HDLC SERPL-MCNC: 54 MG/DL
HGB BLD-MCNC: 11.8 G/DL
HYALINE CASTS: 2 /LPF
IMM GRANULOCYTES NFR BLD AUTO: 0.5 %
IRON SERPL-MCNC: 73 UG/DL
KETONES URINE: NEGATIVE
LDLC SERPL CALC-MCNC: 87 MG/DL
LEUKOCYTE ESTERASE URINE: NEGATIVE
LYMPHOCYTES # BLD AUTO: 1.2 K/UL
LYMPHOCYTES NFR BLD AUTO: 18.2 %
MAN DIFF?: NORMAL
MCHC RBC-ENTMCNC: 28.6 PG
MCHC RBC-ENTMCNC: 31.1 GM/DL
MCV RBC AUTO: 92.2 FL
MICROALBUMIN 24H UR DL<=1MG/L-MCNC: 115.4 MG/DL
MICROALBUMIN/CREAT 24H UR-RTO: 1933 MG/G
MICROSCOPIC-UA: NORMAL
MONOCYTES # BLD AUTO: 0.43 K/UL
MONOCYTES NFR BLD AUTO: 6.5 %
NEUTROPHILS # BLD AUTO: 4.71 K/UL
NEUTROPHILS NFR BLD AUTO: 71.2 %
NITRITE URINE: NEGATIVE
NONHDLC SERPL-MCNC: 105 MG/DL
PARATHYROID HORMONE INTACT: 102 PG/ML
PH URINE: 6
PLATELET # BLD AUTO: 183 K/UL
POTASSIUM SERPL-SCNC: 4.6 MMOL/L
PROT SERPL-MCNC: 7.5 G/DL
PROTEIN URINE: ABNORMAL
PSA SERPL-MCNC: 9.24 NG/ML
RBC # BLD: 4.12 M/UL
RBC # FLD: 15.5 %
RED BLOOD CELLS URINE: 0 /HPF
SODIUM SERPL-SCNC: 140 MMOL/L
SPECIFIC GRAVITY URINE: 1.01
SQUAMOUS EPITHELIAL CELLS: 0 /HPF
TRIGL SERPL-MCNC: 87 MG/DL
TSH SERPL-ACNC: 3.06 UIU/ML
URATE SERPL-MCNC: 6.7 MG/DL
UROBILINOGEN URINE: NORMAL
VIT B12 SERPL-MCNC: 969 PG/ML
WBC # FLD AUTO: 6.61 K/UL
WHITE BLOOD CELLS URINE: 1 /HPF

## 2021-11-08 ENCOUNTER — RESULT CHARGE (OUTPATIENT)
Age: 81
End: 2021-11-08

## 2021-11-09 ENCOUNTER — APPOINTMENT (OUTPATIENT)
Dept: CARDIOLOGY | Facility: CLINIC | Age: 81
End: 2021-11-09
Payer: MEDICARE

## 2021-11-09 ENCOUNTER — NON-APPOINTMENT (OUTPATIENT)
Age: 81
End: 2021-11-09

## 2021-11-09 VITALS
HEART RATE: 66 BPM | SYSTOLIC BLOOD PRESSURE: 138 MMHG | OXYGEN SATURATION: 100 % | BODY MASS INDEX: 23.82 KG/M2 | WEIGHT: 159 LBS | HEIGHT: 68.5 IN | DIASTOLIC BLOOD PRESSURE: 69 MMHG

## 2021-11-09 PROCEDURE — 93000 ELECTROCARDIOGRAM COMPLETE: CPT

## 2021-11-09 PROCEDURE — 99214 OFFICE O/P EST MOD 30 MIN: CPT

## 2021-11-09 NOTE — HISTORY OF PRESENT ILLNESS
[FreeTextEntry1] : Albert exercises daily walking and elliptical. BP low after. No CP, palpitations or SOB.

## 2021-11-09 NOTE — DISCUSSION/SUMMARY
[FreeTextEntry1] : The patient is a 81-year-old gentleman hypertension, CKD, DM with low BP after exercise\par #1 CV- no acute ischemia, echo normal, exercise stress test\par #2 Htn- continue amlodipine/benzapril 5/20, torsemide 20 day,atenolol 100mg, continue doxazosin\par #3 Lipids- continue atorvastatin, coQ 10\par #4 DM- on januvia\par #5 Gout- on allopurinol\par #6 Renal- f/u Dr. Holloway, increased torsemide and reverted back to prior dosing of meds

## 2021-11-09 NOTE — REVIEW OF SYSTEMS
[Negative] : Heme/Lymph [SOB] : no shortness of breath [Dyspnea on exertion] : not dyspnea during exertion [Chest Discomfort] : no chest discomfort [Lower Ext Edema] : no extremity edema [Leg Claudication] : no intermittent leg claudication [Syncope] : no syncope

## 2021-11-19 ENCOUNTER — APPOINTMENT (OUTPATIENT)
Dept: UROLOGY | Facility: CLINIC | Age: 81
End: 2021-11-19
Payer: MEDICARE

## 2021-11-19 PROCEDURE — 99213 OFFICE O/P EST LOW 20 MIN: CPT

## 2021-11-23 NOTE — HISTORY OF PRESENT ILLNESS
[FreeTextEntry1] : Patient is a 80 yo M who presents with BPH/LUTS and elevated PSA.  He is a pt of Dr Hines but unable to make appt for months to see him and therefore presents to me today.  LUTS well controlled with doxazosin, mainly nocturia 2.  AUA SS 6, bother 2.  He recalls a prostate procedure in the past.\par He is followed by Dr Hines for elevated PSA in the past, range ~5.\par He was last seen by me once in 2019, he did not follow up with Dr Hines.\par \par Most recently PSA in 10/2021 -9.24.  Cr 3.21, eGFR 20 \par Recent renal ULT showed renal parenchymal disease and b/l renal cysts.\par Prostatomegaly.\par No dysuria, incontinence, retention, hematuria.

## 2021-11-23 NOTE — ASSESSMENT
[FreeTextEntry1] : Patient is a 82 yo M who presents with BPH/LUTS and elevated PSA\par \par -given rising PSA, would obtain prostate MRI.  His Cr is elevated, eGFR is 20. However based on ACR recommendation, group 2 agents would be extremely low risk for NSF.  D/w pt that there is a potential concern for NSF and instructed pt to discuss with radiology. But would plan for no contrast\par -f/u after MRI

## 2021-11-23 NOTE — PHYSICAL EXAM
[General Appearance - Well Developed] : well developed [General Appearance - Well Nourished] : well nourished [Normal Appearance] : normal appearance [Well Groomed] : well groomed [General Appearance - In No Acute Distress] : no acute distress [Scrotum] : the scrotum was normal [Testes Mass (___cm)] : there were no testicular masses [Prostate Tenderness] : the prostate was not tender [Prostate Size ___ gm] : prostate size [unfilled] gm [Inguinal Lymph Nodes Enlarged Bilaterally] : inguinal [FreeTextEntry1] : JASON with small nodularity at L apex

## 2021-11-23 NOTE — ADDENDUM
[FreeTextEntry1] : Emailed with Dr Farhad Taylor of [facility] - ok for MRI with contrast with his Cr/GFR

## 2021-12-01 ENCOUNTER — RX RENEWAL (OUTPATIENT)
Age: 81
End: 2021-12-01

## 2021-12-01 ENCOUNTER — APPOINTMENT (OUTPATIENT)
Dept: MRI IMAGING | Facility: IMAGING CENTER | Age: 81
End: 2021-12-01
Payer: MEDICARE

## 2021-12-01 ENCOUNTER — OUTPATIENT (OUTPATIENT)
Dept: OUTPATIENT SERVICES | Facility: HOSPITAL | Age: 81
LOS: 1 days | End: 2021-12-01
Payer: MEDICARE

## 2021-12-01 ENCOUNTER — RESULT REVIEW (OUTPATIENT)
Age: 81
End: 2021-12-01

## 2021-12-01 DIAGNOSIS — R97.20 ELEVATED PROSTATE SPECIFIC ANTIGEN [PSA]: ICD-10-CM

## 2021-12-01 DIAGNOSIS — Z98.89 OTHER SPECIFIED POSTPROCEDURAL STATES: Chronic | ICD-10-CM

## 2021-12-01 PROCEDURE — G1004: CPT

## 2021-12-01 PROCEDURE — 76498P: CUSTOM | Mod: 26,MH

## 2021-12-01 PROCEDURE — 72197 MRI PELVIS W/O & W/DYE: CPT | Mod: 26,ME

## 2021-12-01 PROCEDURE — 76498 UNLISTED MR PROCEDURE: CPT

## 2021-12-01 PROCEDURE — A9585: CPT

## 2021-12-01 PROCEDURE — 72197 MRI PELVIS W/O & W/DYE: CPT | Mod: ME

## 2021-12-06 ENCOUNTER — NON-APPOINTMENT (OUTPATIENT)
Age: 81
End: 2021-12-06

## 2021-12-06 ENCOUNTER — APPOINTMENT (OUTPATIENT)
Age: 81
End: 2021-12-06

## 2021-12-13 ENCOUNTER — RX RENEWAL (OUTPATIENT)
Age: 81
End: 2021-12-13

## 2021-12-14 ENCOUNTER — APPOINTMENT (OUTPATIENT)
Dept: NEPHROLOGY | Facility: CLINIC | Age: 81
End: 2021-12-14
Payer: MEDICARE

## 2021-12-14 VITALS
OXYGEN SATURATION: 98 % | BODY MASS INDEX: 24.61 KG/M2 | TEMPERATURE: 97.5 F | SYSTOLIC BLOOD PRESSURE: 172 MMHG | HEART RATE: 64 BPM | DIASTOLIC BLOOD PRESSURE: 80 MMHG | HEIGHT: 68.5 IN | WEIGHT: 164.24 LBS

## 2021-12-14 VITALS — HEART RATE: 64 BPM | DIASTOLIC BLOOD PRESSURE: 80 MMHG | SYSTOLIC BLOOD PRESSURE: 160 MMHG

## 2021-12-14 PROCEDURE — 99214 OFFICE O/P EST MOD 30 MIN: CPT

## 2021-12-15 NOTE — ASSESSMENT
[FreeTextEntry1] : 81-year-old with history of chronic kidney disease stage IV, hypertension, diabetes, MGUS\par Chronic kidney disease stage IV: Renal function progressive. \par Discussed his degree of CKD and the concerns I have for the progressive nature of his CKD\par Anemia: trend CBC\par Mild hypercalcemia: Trend PTH \par Mild hyperkalemia: low K diet\par Hypertension: BP have been elevated. Increase cardura 8mg and decrease atenolol to 50mg\par Diabetes: stable\par Proteinuria: Trend -Modest protein diet. Continue ACE. \par Hyperlipidemia: per Dr. Baca\par MGUS: follow with Heme\par Gout: Now on allopurinol. No gout\par 4 months follow-up with me\par Enrolled in healthy transition program

## 2021-12-15 NOTE — HISTORY OF PRESENT ILLNESS
[FreeTextEntry1] : 81-year-old with history of long-standing hypertension, diabetes, chronic kidney disease stage IV for follow-up. \par He had his renal bx which we discussed in April with 50% global sclerosis, HTN adaptive changes\par Not working out as much. He is sad bc of loss of his dog Dinesh \par He is still with dogs\par He followed with urology regarding elevated PSA\par

## 2021-12-15 NOTE — PHYSICAL EXAM
[General Appearance - Alert] : alert [General Appearance - In No Acute Distress] : in no acute distress [Sclera] : the sclera and conjunctiva were normal [Neck Appearance] : the appearance of the neck was normal [Auscultation Breath Sounds / Voice Sounds] : lungs were clear to auscultation bilaterally [Heart Rate And Rhythm] : heart rate was normal and rhythm regular [Heart Sounds] : normal S1 and S2 [Edema] : there was no peripheral edema [Bowel Sounds] : normal bowel sounds [Abdomen Soft] : soft [No CVA Tenderness] : no ~M costovertebral angle tenderness [Involuntary Movements] : no involuntary movements were seen [] : no rash [No Focal Deficits] : no focal deficits [Oriented To Time, Place, And Person] : oriented to person, place, and time [Affect] : the affect was normal [Mood] : the mood was normal

## 2022-01-14 ENCOUNTER — RX RENEWAL (OUTPATIENT)
Age: 82
End: 2022-01-14

## 2022-01-18 ENCOUNTER — APPOINTMENT (OUTPATIENT)
Dept: INTERNAL MEDICINE | Facility: CLINIC | Age: 82
End: 2022-01-18
Payer: MEDICARE

## 2022-01-18 ENCOUNTER — LABORATORY RESULT (OUTPATIENT)
Age: 82
End: 2022-01-18

## 2022-01-18 VITALS
DIASTOLIC BLOOD PRESSURE: 70 MMHG | BODY MASS INDEX: 23.05 KG/M2 | HEIGHT: 70 IN | OXYGEN SATURATION: 96 % | SYSTOLIC BLOOD PRESSURE: 140 MMHG | HEART RATE: 71 BPM | TEMPERATURE: 97.9 F | WEIGHT: 161 LBS

## 2022-01-18 DIAGNOSIS — Z20.822 CONTACT WITH AND (SUSPECTED) EXPOSURE TO COVID-19: ICD-10-CM

## 2022-01-18 PROCEDURE — 99214 OFFICE O/P EST MOD 30 MIN: CPT | Mod: 25

## 2022-01-19 NOTE — ASSESSMENT
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 Holloway to assess elevated blood pressure with change in medication as per patient\par Low potassium diet\par Hematology f/u for MGUS\par \par \par Full labs sent today = blood drawn here in the office\par Urine sent today\par Had flu vaccine and pneumonia vaccines and covid vaccines\par Continue meds, diet, exercise as outlined\par F/U with all consulting MD's \par RTC 3-4 months and as needed \par To call for any medical issues\par All of the above was discussed in detail with him and all of his questions were answered\par He verbally confirmed understanding of all of the above and agreement with the above plan

## 2022-01-19 NOTE — HISTORY OF PRESENT ILLNESS
[de-identified] : See narrative below. [FreeTextEntry1] : Feeling well.  No f/c/s.\par No edema, orthopnea or PND.\par Normal BM/UO.\par Denies abdominal pain, n/v.\par Denies CP, SOB, cough, hemoptysis.\par Denies palpitations or diaphoresis.  No calf pain.\par Reports that his blood pressure which he monitors at home has been running higher with his recent change in medications by Dr. Holloway.\par Denies h/a or visual/speech disturbance.\par No new complaints.\par Very sad and tearful as his longtime pet dog Dinesh .

## 2022-01-19 NOTE — HEALTH RISK ASSESSMENT
[No] : In the past 12 months have you used drugs other than those required for medical reasons? No [No falls in past year] : Patient reported no falls in the past year [0] : 2) Feeling down, depressed, or hopeless: Not at all (0) [de-identified] : renal, urology, cardiology [de-identified] : exercises [de-identified] : low salt/low fat/ADA/ low potassium [[id number]] : 0 [With Patient/Caregiver] : , with patient/caregiver [Reviewed no changes] : Reviewed, no changes [Designated Healthcare Proxy] : Designated healthcare proxy [Name: ___] : Health Care Proxy's Name: [unfilled]  [Relationship: ___] : Relationship: [unfilled] [AdvancecareDate] : 01/22

## 2022-01-19 NOTE — PHYSICAL EXAM
[No Acute Distress] : no acute distress [Well Nourished] : well nourished [Well Developed] : well developed [Well-Appearing] : well-appearing [Normal Voice/Communication] : normal voice/communication [Normal Sclera/Conjunctiva] : normal sclera/conjunctiva [PERRL] : pupils equal round and reactive to light [EOMI] : extraocular movements intact [Normal Outer Ear/Nose] : the outer ears and nose were normal in appearance [Normal TMs] : both tympanic membranes were normal [No JVD] : no jugular venous distention [Supple] : supple [No Respiratory Distress] : no respiratory distress  [Clear to Auscultation] : lungs were clear to auscultation bilaterally [No Accessory Muscle Use] : no accessory muscle use [Normal Rate] : normal rate  [Regular Rhythm] : with a regular rhythm [Normal S1, S2] : normal S1 and S2 [No Carotid Bruits] : no carotid bruits [No Edema] : there was no peripheral edema [No Extremity Clubbing/Cyanosis] : no extremity clubbing/cyanosis [Soft] : abdomen soft [Non Tender] : non-tender [Normal Bowel Sounds] : normal bowel sounds [No CVA Tenderness] : no CVA  tenderness [No Spinal Tenderness] : no spinal tenderness [No Rash] : no rash [Normal Gait] : normal gait [No Focal Deficits] : no focal deficits [Speech Grossly Normal] : speech grossly normal [Normal Affect] : the affect was normal [Alert and Oriented x3] : oriented to person, place, and time [Declined Breast Exam] : declined breast exam  [Declined Rectal Exam] : declined rectal exam [de-identified] : no ST; wearing full facemask [de-identified] : no stridor [de-identified] : R=16 [de-identified] : As per urology

## 2022-01-20 ENCOUNTER — RX RENEWAL (OUTPATIENT)
Age: 82
End: 2022-01-20

## 2022-01-21 ENCOUNTER — RX RENEWAL (OUTPATIENT)
Age: 82
End: 2022-01-21

## 2022-01-21 LAB
ALBUMIN SERPL ELPH-MCNC: 4.5 G/DL
ALP BLD-CCNC: 80 U/L
ALT SERPL-CCNC: 13 U/L
ANION GAP SERPL CALC-SCNC: 13 MMOL/L
AST SERPL-CCNC: 20 U/L
BASOPHILS # BLD AUTO: 0.05 K/UL
BASOPHILS NFR BLD AUTO: 0.9 %
BILIRUB SERPL-MCNC: 0.3 MG/DL
BUN SERPL-MCNC: 44 MG/DL
CALCIUM SERPL-MCNC: 10 MG/DL
CALCIUM SERPL-MCNC: 10 MG/DL
CHLORIDE SERPL-SCNC: 104 MMOL/L
CHOLEST SERPL-MCNC: 151 MG/DL
CO2 SERPL-SCNC: 21 MMOL/L
COVID-19 NUCLEOCAPSID  GAM ANTIBODY INTERPRETATION: NEGATIVE
COVID-19 SPIKE DOMAIN ANTIBODY INTERPRETATION: POSITIVE
CREAT SERPL-MCNC: 3.32 MG/DL
CREAT SPEC-SCNC: 127 MG/DL
EOSINOPHIL # BLD AUTO: 0.13 K/UL
EOSINOPHIL NFR BLD AUTO: 2.4 %
ESTIMATED AVERAGE GLUCOSE: 114 MG/DL
FOLATE SERPL-MCNC: 14.2 NG/ML
GLUCOSE SERPL-MCNC: 99 MG/DL
HBA1C MFR BLD HPLC: 5.6 %
HCT VFR BLD CALC: 37.9 %
HDLC SERPL-MCNC: 55 MG/DL
HGB BLD-MCNC: 11.8 G/DL
IMM GRANULOCYTES NFR BLD AUTO: 0.4 %
IRON SERPL-MCNC: 59 UG/DL
LDLC SERPL CALC-MCNC: 74 MG/DL
LYMPHOCYTES # BLD AUTO: 1 K/UL
LYMPHOCYTES NFR BLD AUTO: 18.2 %
MAN DIFF?: NORMAL
MCHC RBC-ENTMCNC: 28.8 PG
MCHC RBC-ENTMCNC: 31.1 GM/DL
MCV RBC AUTO: 92.4 FL
MICROALBUMIN 24H UR DL<=1MG/L-MCNC: 311.2 MG/DL
MICROALBUMIN/CREAT 24H UR-RTO: 2459 MG/G
MONOCYTES # BLD AUTO: 0.43 K/UL
MONOCYTES NFR BLD AUTO: 7.8 %
NEUTROPHILS # BLD AUTO: 3.85 K/UL
NEUTROPHILS NFR BLD AUTO: 70.3 %
NONHDLC SERPL-MCNC: 96 MG/DL
PARATHYROID HORMONE INTACT: 99 PG/ML
PLATELET # BLD AUTO: 167 K/UL
POTASSIUM SERPL-SCNC: 4.8 MMOL/L
PROT SERPL-MCNC: 7.4 G/DL
PSA SERPL-MCNC: 8.2 NG/ML
RBC # BLD: 4.1 M/UL
RBC # FLD: 15.9 %
SARS-COV-2 AB SERPL IA-ACNC: >250 U/ML
SARS-COV-2 AB SERPL QL IA: 0.12 INDEX
SODIUM SERPL-SCNC: 139 MMOL/L
TRIGL SERPL-MCNC: 110 MG/DL
URATE SERPL-MCNC: 6.2 MG/DL
VIT B12 SERPL-MCNC: 719 PG/ML
WBC # FLD AUTO: 5.48 K/UL

## 2022-01-21 RX ORDER — ATENOLOL 100 MG/1
100 TABLET ORAL
Qty: 90 | Refills: 0 | Status: DISCONTINUED | COMMUNITY
Start: 2021-10-27 | End: 2022-01-21

## 2022-01-24 LAB
ALBUMIN MFR SERPL ELPH: 57.6 %
ALBUMIN SERPL-MCNC: 4.3 G/DL
ALBUMIN/GLOB SERPL: 1.4 RATIO
ALPHA1 GLOB MFR SERPL ELPH: 3.6 %
ALPHA1 GLOB SERPL ELPH-MCNC: 0.3 G/DL
ALPHA2 GLOB MFR SERPL ELPH: 13.1 %
ALPHA2 GLOB SERPL ELPH-MCNC: 1 G/DL
B-GLOBULIN MFR SERPL ELPH: 10 %
B-GLOBULIN SERPL ELPH-MCNC: 0.7 G/DL
GAMMA GLOB FLD ELPH-MCNC: 1.2 G/DL
GAMMA GLOB MFR SERPL ELPH: 15.7 %
INTERPRETATION SERPL IEP-IMP: NORMAL
M PROTEIN MFR SERPL ELPH: 4.5 %
MONOCLON BAND OBS SERPL: 0.3 G/DL
PROT SERPL-MCNC: 7.4 G/DL
PROT SERPL-MCNC: 7.4 G/DL

## 2022-02-07 ENCOUNTER — RX RENEWAL (OUTPATIENT)
Age: 82
End: 2022-02-07

## 2022-03-03 ENCOUNTER — RX RENEWAL (OUTPATIENT)
Age: 82
End: 2022-03-03

## 2022-03-11 LAB
ALBUMIN SERPL ELPH-MCNC: 4.4 G/DL
ANION GAP SERPL CALC-SCNC: 12 MMOL/L
BUN SERPL-MCNC: 43 MG/DL
CALCIUM SERPL-MCNC: 9.4 MG/DL
CHLORIDE SERPL-SCNC: 106 MMOL/L
CO2 SERPL-SCNC: 21 MMOL/L
CREAT SERPL-MCNC: 3.3 MG/DL
EGFR: 18 ML/MIN/1.73M2
GLUCOSE SERPL-MCNC: 116 MG/DL
PHOSPHATE SERPL-MCNC: 3.7 MG/DL
POTASSIUM SERPL-SCNC: 5 MMOL/L
SODIUM SERPL-SCNC: 139 MMOL/L

## 2022-04-07 ENCOUNTER — RX RENEWAL (OUTPATIENT)
Age: 82
End: 2022-04-07

## 2022-04-21 LAB
25(OH)D3 SERPL-MCNC: 32.6 NG/ML
ALBUMIN SERPL ELPH-MCNC: 4.3 G/DL
ANION GAP SERPL CALC-SCNC: 12 MMOL/L
APPEARANCE: CLEAR
BACTERIA: NEGATIVE
BASOPHILS # BLD AUTO: 0.04 K/UL
BASOPHILS NFR BLD AUTO: 0.7 %
BILIRUBIN URINE: NEGATIVE
BLOOD URINE: NEGATIVE
BUN SERPL-MCNC: 49 MG/DL
CALCIUM SERPL-MCNC: 9.5 MG/DL
CALCIUM SERPL-MCNC: 9.5 MG/DL
CHLORIDE SERPL-SCNC: 104 MMOL/L
CO2 SERPL-SCNC: 23 MMOL/L
COLOR: YELLOW
CREAT SERPL-MCNC: 3.68 MG/DL
CREAT SPEC-SCNC: 62 MG/DL
EGFR: 16 ML/MIN/1.73M2
EOSINOPHIL # BLD AUTO: 0.15 K/UL
EOSINOPHIL NFR BLD AUTO: 2.6 %
ESTIMATED AVERAGE GLUCOSE: 114 MG/DL
FERRITIN SERPL-MCNC: 252 NG/ML
GLUCOSE QUALITATIVE U: NEGATIVE
GLUCOSE SERPL-MCNC: 102 MG/DL
HBA1C MFR BLD HPLC: 5.6 %
HCT VFR BLD CALC: 34.9 %
HGB BLD-MCNC: 10.8 G/DL
HYALINE CASTS: 2 /LPF
IMM GRANULOCYTES NFR BLD AUTO: 0.5 %
IRON SATN MFR SERPL: 36 %
IRON SERPL-MCNC: 86 UG/DL
KETONES URINE: NEGATIVE
LEUKOCYTE ESTERASE URINE: NEGATIVE
LYMPHOCYTES # BLD AUTO: 1.74 K/UL
LYMPHOCYTES NFR BLD AUTO: 29.6 %
MAGNESIUM SERPL-MCNC: 2.5 MG/DL
MAN DIFF?: NORMAL
MCHC RBC-ENTMCNC: 28.6 PG
MCHC RBC-ENTMCNC: 30.9 GM/DL
MCV RBC AUTO: 92.6 FL
MICROALBUMIN 24H UR DL<=1MG/L-MCNC: 158.3 MG/DL
MICROALBUMIN/CREAT 24H UR-RTO: 2541 MG/G
MICROSCOPIC-UA: NORMAL
MONOCYTES # BLD AUTO: 0.39 K/UL
MONOCYTES NFR BLD AUTO: 6.6 %
NEUTROPHILS # BLD AUTO: 3.53 K/UL
NEUTROPHILS NFR BLD AUTO: 60 %
NITRITE URINE: NEGATIVE
PARATHYROID HORMONE INTACT: 148 PG/ML
PH URINE: 6
PHOSPHATE SERPL-MCNC: 4 MG/DL
PLATELET # BLD AUTO: 165 K/UL
POTASSIUM SERPL-SCNC: 4.8 MMOL/L
PROTEIN URINE: ABNORMAL
RBC # BLD: 3.77 M/UL
RBC # FLD: 15.5 %
RED BLOOD CELLS URINE: 2 /HPF
SODIUM SERPL-SCNC: 139 MMOL/L
SPECIFIC GRAVITY URINE: 1.01
SQUAMOUS EPITHELIAL CELLS: 0 /HPF
TIBC SERPL-MCNC: 242 UG/DL
UIBC SERPL-MCNC: 156 UG/DL
URATE SERPL-MCNC: 6.8 MG/DL
UROBILINOGEN URINE: NORMAL
WBC # FLD AUTO: 5.88 K/UL
WHITE BLOOD CELLS URINE: 1 /HPF

## 2022-04-26 ENCOUNTER — APPOINTMENT (OUTPATIENT)
Dept: NEPHROLOGY | Facility: CLINIC | Age: 82
End: 2022-04-26
Payer: MEDICARE

## 2022-04-26 VITALS
TEMPERATURE: 97.6 F | BODY MASS INDEX: 23.48 KG/M2 | OXYGEN SATURATION: 98 % | HEIGHT: 70 IN | WEIGHT: 164 LBS | HEART RATE: 59 BPM | DIASTOLIC BLOOD PRESSURE: 80 MMHG | SYSTOLIC BLOOD PRESSURE: 164 MMHG

## 2022-04-26 VITALS — DIASTOLIC BLOOD PRESSURE: 80 MMHG | HEART RATE: 64 BPM | SYSTOLIC BLOOD PRESSURE: 154 MMHG

## 2022-04-26 PROCEDURE — 99214 OFFICE O/P EST MOD 30 MIN: CPT

## 2022-04-26 NOTE — HISTORY OF PRESENT ILLNESS
[FreeTextEntry1] : 81-year-old with history of long-standing hypertension, diabetes, chronic kidney disease stage IV for follow-up. \par He had his renal bx which we discussed in April with 50% global sclerosis, HTN adaptive changes\par Not working out as much.\par He still has nocturia and freq\par BP at home in 150s systolic\par

## 2022-04-26 NOTE — ASSESSMENT
[FreeTextEntry1] : 81-year-old with history of chronic kidney disease stage IV, hypertension, diabetes, MGUS\par Chronic kidney disease stage IV: Renal function progressive and confirmed on renal biopsy chronic changes\par Anemia: trend CBC\par Mild hypercalcemia: Trend PTH \par Mild hyperkalemia: improved on low K diet\par Hypertension: BP have been elevated. Continue cardura 12mg and increase atenolol to 50mg BID\par Diabetes: stable\par Proteinuria: Trend -Modest protein diet. Continue ACE. \par Hyperlipidemia: per Dr. Baca\par MGUS: follow with Heme- no signs of MGRS on renal biopsy\par Gout: Now on allopurinol. No gout\par BPH- he is agreeable to start finasteride as well for his BPH symptoms- will prescribe 5mg daily\par 3 months follow-up with me\par Enrolled in healthy transition program

## 2022-04-26 NOTE — PHYSICAL EXAM
[General Appearance - Alert] : alert [General Appearance - In No Acute Distress] : in no acute distress [Sclera] : the sclera and conjunctiva were normal [Neck Appearance] : the appearance of the neck was normal [Auscultation Breath Sounds / Voice Sounds] : lungs were clear to auscultation bilaterally [Heart Rate And Rhythm] : heart rate was normal and rhythm regular [Heart Sounds] : normal S1 and S2 [Edema] : there was no peripheral edema [Bowel Sounds] : normal bowel sounds [Abdomen Soft] : soft [No CVA Tenderness] : no ~M costovertebral angle tenderness [Involuntary Movements] : no involuntary movements were seen [No Focal Deficits] : no focal deficits [] : no rash [Affect] : the affect was normal [Oriented To Time, Place, And Person] : oriented to person, place, and time [Mood] : the mood was normal

## 2022-05-12 ENCOUNTER — RX RENEWAL (OUTPATIENT)
Age: 82
End: 2022-05-12

## 2022-05-17 ENCOUNTER — APPOINTMENT (OUTPATIENT)
Dept: INTERNAL MEDICINE | Facility: CLINIC | Age: 82
End: 2022-05-17
Payer: MEDICARE

## 2022-05-17 VITALS
HEART RATE: 56 BPM | WEIGHT: 159 LBS | DIASTOLIC BLOOD PRESSURE: 74 MMHG | HEIGHT: 69.5 IN | SYSTOLIC BLOOD PRESSURE: 132 MMHG | OXYGEN SATURATION: 99 % | BODY MASS INDEX: 23.02 KG/M2 | TEMPERATURE: 97.6 F

## 2022-05-17 PROCEDURE — 99214 OFFICE O/P EST MOD 30 MIN: CPT | Mod: 25

## 2022-05-17 PROCEDURE — 36415 COLL VENOUS BLD VENIPUNCTURE: CPT

## 2022-05-17 NOTE — HEALTH RISK ASSESSMENT
[No] : In the past 12 months have you used drugs other than those required for medical reasons? No [No falls in past year] : Patient reported no falls in the past year [0] : 2) Feeling down, depressed, or hopeless: Not at all (0) [With Patient/Caregiver] : , with patient/caregiver [Reviewed no changes] : Reviewed, no changes [Designated Healthcare Proxy] : Designated healthcare proxy [Name: ___] : Health Care Proxy's Name: [unfilled]  [Relationship: ___] : Relationship: [unfilled] [Never] : Never [PHQ-2 Negative - No further assessment needed] : PHQ-2 Negative - No further assessment needed [de-identified] : renal [de-identified] : exercises [de-identified] : low salt/low fat/ADA/ low potassium [[id number]] : 0 [AdvancecareDate] : 05/22

## 2022-05-17 NOTE — ASSESSMENT
[FreeTextEntry1] : HTN\par BP fairly well -controlled \par Continue present regimen\par Renal f/u\par Cardiology f/u /// needs f/u echo for f/u of murmur\par Low sodium diet\par \par Hyperlipidemia\par Lipid profile sent today\par Low fat diet\par Continue Lipitor\par \par Diabetes with associated chronic kidney disease/proteinuria\par HGBA1c and urine for microalbumin sent today\par ADA diet\par Weight control\par Exercise\par Monitor FSG's\par Renal f/u\par Consider endocrine f/u = he continues to refuse\par To see podiatry \par To see ophtho for diabetic eye exam \par Continue Januvia\par \par CKD\par s/p renal biopsy\par Renal f/u\par Monitor creatinine/GFR\par Low potassium diet\par Hematology f/u for MGUS\par \par BPH\par Continue present meds\par Must see Urology in f/u ASAP\par \par \par Full labs sent today\par Urine sent today\par Had flu vaccine and pneumonia vaccines and covid vaccines\par Continue meds, diet, exercise as outlined\par F/U with all consulting MD's \par RTC 3-4 months and as needed \par To call for any medical issues\par All of the above was discussed in detail with him and all of his questions were answered\par He verbally confirmed understanding of all of the above and agreement with the above plan

## 2022-05-17 NOTE — REVIEW OF SYSTEMS
[Fatigue] : fatigue [Vision Problems] : vision problems [Negative] : Heme/Lymph [Hesitancy] : hesitancy [Nocturia] : nocturia

## 2022-05-17 NOTE — PHYSICAL EXAM
[No Acute Distress] : no acute distress [Well Nourished] : well nourished [Well Developed] : well developed [Well-Appearing] : well-appearing [Normal Voice/Communication] : normal voice/communication [Normal Sclera/Conjunctiva] : normal sclera/conjunctiva [PERRL] : pupils equal round and reactive to light [EOMI] : extraocular movements intact [Normal Outer Ear/Nose] : the outer ears and nose were normal in appearance [No JVD] : no jugular venous distention [Supple] : supple [No Respiratory Distress] : no respiratory distress  [Clear to Auscultation] : lungs were clear to auscultation bilaterally [No Accessory Muscle Use] : no accessory muscle use [Normal Rate] : normal rate  [Regular Rhythm] : with a regular rhythm [Normal S1, S2] : normal S1 and S2 [No Carotid Bruits] : no carotid bruits [No Edema] : there was no peripheral edema [No Extremity Clubbing/Cyanosis] : no extremity clubbing/cyanosis [Declined Breast Exam] : declined breast exam  [Soft] : abdomen soft [Normal Bowel Sounds] : normal bowel sounds [Declined Rectal Exam] : declined rectal exam [No CVA Tenderness] : no CVA  tenderness [No Spinal Tenderness] : no spinal tenderness [No Rash] : no rash [Normal Gait] : normal gait [No Focal Deficits] : no focal deficits [Speech Grossly Normal] : speech grossly normal [Normal Affect] : the affect was normal [Alert and Oriented x3] : oriented to person, place, and time [de-identified] : no ST; wearing full facemask [de-identified] : no stridor [de-identified] : R=16 [de-identified] : Loud murmur unchanged [de-identified] : No cords [de-identified] : As per urology

## 2022-05-17 NOTE — HISTORY OF PRESENT ILLNESS
[de-identified] : See narrative below. [FreeTextEntry1] : Feeling well.  No f/c/s.\par No edema, orthopnea or PND.\par Normal BM.\par Plans to see urology as has reduced urinary stream and nocturia related to BPH.\par Denies abdominal pain, n/v.\par Denies CP, SOB, cough, hemoptysis.\par Denies palpitations or diaphoresis.  No calf pain.\par Dr. Holloway has been adjusting his BP meds for better control of HTN.\par Denies h/a or visual/speech disturbance.\par No new complaints.\par Enjoying his dogs.\par Plans to start planting his garden with help of family.

## 2022-05-20 LAB
25(OH)D3 SERPL-MCNC: 28.8 NG/ML
ALBUMIN SERPL ELPH-MCNC: 4.4 G/DL
ALP BLD-CCNC: 82 U/L
ALT SERPL-CCNC: 13 U/L
ANION GAP SERPL CALC-SCNC: 14 MMOL/L
APPEARANCE: CLEAR
AST SERPL-CCNC: 18 U/L
BACTERIA UR CULT: NORMAL
BACTERIA: NEGATIVE
BASOPHILS # BLD AUTO: 0.08 K/UL
BASOPHILS NFR BLD AUTO: 1.4 %
BILIRUB SERPL-MCNC: 0.4 MG/DL
BILIRUBIN URINE: NEGATIVE
BLOOD URINE: NEGATIVE
BUN SERPL-MCNC: 37 MG/DL
CALCIUM SERPL-MCNC: 9.8 MG/DL
CALCIUM SERPL-MCNC: 9.8 MG/DL
CHLORIDE SERPL-SCNC: 106 MMOL/L
CHOLEST SERPL-MCNC: 151 MG/DL
CO2 SERPL-SCNC: 22 MMOL/L
COLOR: NORMAL
COVID-19 NUCLEOCAPSID  GAM ANTIBODY INTERPRETATION: NEGATIVE
COVID-19 SPIKE DOMAIN ANTIBODY INTERPRETATION: POSITIVE
CREAT SERPL-MCNC: 3.54 MG/DL
CREAT SPEC-SCNC: 58 MG/DL
EGFR: 17 ML/MIN/1.73M2
EOSINOPHIL # BLD AUTO: 0.15 K/UL
EOSINOPHIL NFR BLD AUTO: 2.6 %
ESTIMATED AVERAGE GLUCOSE: 111 MG/DL
FOLATE SERPL-MCNC: 16.7 NG/ML
GLUCOSE QUALITATIVE U: NEGATIVE
GLUCOSE SERPL-MCNC: 98 MG/DL
HBA1C MFR BLD HPLC: 5.5 %
HCT VFR BLD CALC: 34.7 %
HDLC SERPL-MCNC: 62 MG/DL
HGB BLD-MCNC: 10.7 G/DL
HYALINE CASTS: 1 /LPF
IMM GRANULOCYTES NFR BLD AUTO: 0.3 %
IRON SERPL-MCNC: 80 UG/DL
KETONES URINE: NEGATIVE
LDLC SERPL CALC-MCNC: 75 MG/DL
LEUKOCYTE ESTERASE URINE: NEGATIVE
LYMPHOCYTES # BLD AUTO: 0.97 K/UL
LYMPHOCYTES NFR BLD AUTO: 16.9 %
MAN DIFF?: NORMAL
MCHC RBC-ENTMCNC: 28.6 PG
MCHC RBC-ENTMCNC: 30.8 GM/DL
MCV RBC AUTO: 92.8 FL
MICROALBUMIN 24H UR DL<=1MG/L-MCNC: 137.4 MG/DL
MICROALBUMIN/CREAT 24H UR-RTO: 2362 MG/G
MICROSCOPIC-UA: NORMAL
MONOCYTES # BLD AUTO: 0.44 K/UL
MONOCYTES NFR BLD AUTO: 7.7 %
NEUTROPHILS # BLD AUTO: 4.08 K/UL
NEUTROPHILS NFR BLD AUTO: 71.1 %
NITRITE URINE: NEGATIVE
NONHDLC SERPL-MCNC: 88 MG/DL
PARATHYROID HORMONE INTACT: 126 PG/ML
PH URINE: 6
PLATELET # BLD AUTO: 160 K/UL
POTASSIUM SERPL-SCNC: 4.4 MMOL/L
PROT SERPL-MCNC: 6.7 G/DL
PROTEIN URINE: ABNORMAL
PSA SERPL-MCNC: 29 NG/ML
RBC # BLD: 3.74 M/UL
RBC # FLD: 15.7 %
RED BLOOD CELLS URINE: 1 /HPF
SARS-COV-2 AB SERPL IA-ACNC: >250 U/ML
SARS-COV-2 AB SERPL QL IA: 0.12 INDEX
SODIUM SERPL-SCNC: 142 MMOL/L
SPECIFIC GRAVITY URINE: 1.01
SQUAMOUS EPITHELIAL CELLS: 1 /HPF
TRIGL SERPL-MCNC: 64 MG/DL
TSH SERPL-ACNC: 2.92 UIU/ML
URATE SERPL-MCNC: 6.3 MG/DL
UROBILINOGEN URINE: NORMAL
VIT B12 SERPL-MCNC: 744 PG/ML
WBC # FLD AUTO: 5.74 K/UL
WHITE BLOOD CELLS URINE: 2 /HPF

## 2022-05-27 ENCOUNTER — APPOINTMENT (OUTPATIENT)
Dept: UROLOGY | Facility: CLINIC | Age: 82
End: 2022-05-27
Payer: MEDICARE

## 2022-05-27 PROCEDURE — 99213 OFFICE O/P EST LOW 20 MIN: CPT

## 2022-05-27 NOTE — HISTORY OF PRESENT ILLNESS
[FreeTextEntry1] : Patient is a 80 yo M who presents with BPH/LUTS and elevated PSA.  He is a pt of Dr Hines but unable to make appt for months to see him and therefore presents to me today.  LUTS well controlled with doxazosin, mainly nocturia 2.  AUA SS 6, bother 2.  He recalls a prostate procedure in the past.\par He is followed by Dr Hines for elevated PSA in the past, range ~5.\par He was last seen by me once in 2019, he did not follow up with Dr Hines.\par \par PSA in 10/2021 -9.24.  Cr 3.21, eGFR 20 \par 2021 renal ULT showed renal parenchymal disease and b/l renal cysts.\par Prostate MRI in 12/2021 showed 125cc prostate, PiRADs 2.\par \par He was started on finasteride by Dr Holloway in 4/2022.  Also on doxazosin.\par No dysuria, incontinence, retention, hematuria.  He notes he was having more urinary frequency and urgency and weak stream lately.\par PSA in 5/17/2022 was elevated 29.  Neg UA/cx.  Denies any sexual activity, colonoscopy prior his routine labs with PCP.\par \par PVR today 17cc

## 2022-05-27 NOTE — PHYSICAL EXAM
[General Appearance - Well Developed] : well developed [General Appearance - Well Nourished] : well nourished [Normal Appearance] : normal appearance [Well Groomed] : well groomed [General Appearance - In No Acute Distress] : no acute distress [Urinary Bladder Findings] : the bladder was normal on palpation [] : no respiratory distress [Respiration, Rhythm And Depth] : normal respiratory rhythm and effort [Exaggerated Use Of Accessory Muscles For Inspiration] : no accessory muscle use [Oriented To Time, Place, And Person] : oriented to person, place, and time [Affect] : the affect was normal [Mood] : the mood was normal Folliculitis [Not Anxious] : not anxious

## 2022-05-27 NOTE — ASSESSMENT
[FreeTextEntry1] : Patient is a 80 yo M who presents with BPH/LUTS and elevated PSA\par \par Recent PSA in the 8-9 range, until 10 days ago PSA was noted to be 29\par Given prostate MRI PiRads 2 in 12/2021 - would repeat PSA today.  If PSA downtrending would continue to observe PSA and repeat in 6 wks\par Will contact w results of PSA\par F/u 6 wks

## 2022-06-01 ENCOUNTER — APPOINTMENT (OUTPATIENT)
Age: 82
End: 2022-06-01

## 2022-06-01 LAB — PSA SERPL-MCNC: 28.1 NG/ML

## 2022-06-30 ENCOUNTER — APPOINTMENT (OUTPATIENT)
Dept: GASTROENTEROLOGY | Facility: CLINIC | Age: 82
End: 2022-06-30

## 2022-06-30 VITALS
BODY MASS INDEX: 23 KG/M2 | WEIGHT: 158 LBS | DIASTOLIC BLOOD PRESSURE: 70 MMHG | TEMPERATURE: 98.9 F | SYSTOLIC BLOOD PRESSURE: 140 MMHG

## 2022-06-30 DIAGNOSIS — K59.09 OTHER CONSTIPATION: ICD-10-CM

## 2022-06-30 PROCEDURE — 99204 OFFICE O/P NEW MOD 45 MIN: CPT

## 2022-06-30 NOTE — ASSESSMENT
[FreeTextEntry1] : 1. constipation\par \par plan miralax daily\par \par 2. change in bowel habits and weight loss, recommend colonoscopy to evaluate\par \par Discussed risks including but not limited to bleeding,infection,drug reaction, perforation,missed lesion,benefits and alternatives of colonoscopy/egd with patient  including no\par treatment and patient consents to procedure.\par \par plan; colonoscopy to be scheduled\par \par 3. weight loss,unexplained, recommend egd to r/o esophagitis,gastriitis ,pud etc.\par \par plan egd to be scheduled \par

## 2022-06-30 NOTE — PHYSICAL EXAM
[General Appearance - Alert] : alert [General Appearance - In No Acute Distress] : in no acute distress [General Appearance - Well Nourished] : well nourished [General Appearance - Well Developed] : well developed [Sclera] : the sclera and conjunctiva were normal [Hearing Threshold Finger Rub Not Pettis] : hearing was normal [Auscultation Breath Sounds / Voice Sounds] : lungs were clear to auscultation bilaterally [Heart Sounds] : normal S1 and S2 [No CVA Tenderness] : no ~M costovertebral angle tenderness [Abnormal Walk] : normal gait [No Focal Deficits] : no focal deficits [Oriented To Time, Place, And Person] : oriented to person, place, and time [Bowel Sounds] : normal bowel sounds [Abdomen Soft] : soft [Abdomen Tenderness] : non-tender [Nail Clubbing] : no clubbing  or cyanosis of the fingernails [] : no rash [Skin Lesions] : no skin lesions

## 2022-06-30 NOTE — HISTORY OF PRESENT ILLNESS
[FreeTextEntry1] : 83 yo male s/p colonoscopy in 2011 normal, no family h/o colon cancer, and pt being followed by urology for elevated PSA. Pt reports h/o constipation, last bm today, large soft brown, patient lost 20lb weight loss in past 6 months, no nv/ . nogerd. good appeitie, no brbpr, no melena\par \par

## 2022-06-30 NOTE — REVIEW OF SYSTEMS
[As Noted in HPI] : as noted in HPI [Fever] : no fever [Chills] : no chills [Eyesight Problems] : no eyesight problems [Discharge From Eyes] : no purulent discharge from the eyes [Nosebleeds] : no nosebleeds [Chest Pain] : no chest pain [Cough] : no cough [SOB on Exertion] : no shortness of breath during exertion [Hesitancy] : no urinary hesitancy [Nocturia] : no nocturia [Limb Pain] : no limb pain [Dizziness] : no dizziness [Fainting] : no fainting [Anxiety] : no anxiety [Muscle Weakness] : no muscle weakness

## 2022-07-12 ENCOUNTER — APPOINTMENT (OUTPATIENT)
Dept: UROLOGY | Facility: CLINIC | Age: 82
End: 2022-07-12

## 2022-07-12 DIAGNOSIS — R97.20 ELEVATED PROSTATE, SPECIFIC ANTIGEN [PSA]: ICD-10-CM

## 2022-07-12 PROCEDURE — 99213 OFFICE O/P EST LOW 20 MIN: CPT

## 2022-07-12 NOTE — PHYSICAL EXAM
[General Appearance - Well Developed] : well developed [General Appearance - Well Nourished] : well nourished [Normal Appearance] : normal appearance [Well Groomed] : well groomed [General Appearance - In No Acute Distress] : no acute distress [Prostate Tenderness] : the prostate was not tender [No Prostate Nodules] : no prostate nodules [Prostate Size ___ (0-4)] : prostate size [unfilled] (scale: 0-4) [Oriented To Time, Place, And Person] : oriented to person, place, and time [Affect] : the affect was normal [Mood] : the mood was normal [Not Anxious] : not anxious

## 2022-07-12 NOTE — HISTORY OF PRESENT ILLNESS
[FreeTextEntry1] : Patient is a 83 yo M who presents with BPH/LUTS and elevated PSA.  He is a pt of Dr Hines but unable to make appt for months to see him and therefore presents to me today.  LUTS well controlled with doxazosin, mainly nocturia 2.  AUA SS 6, bother 2.  He recalls a prostate procedure in the past.\par He is followed by Dr Hines for elevated PSA in the past, range ~5.\par He was last seen by me once in 2019, he did not follow up with Dr Hines.\par \par PSA in 10/2021 -9.24.  Cr 3.21, eGFR 20 \par 2021 renal ULT showed renal parenchymal disease and b/l renal cysts.\par Prostate MRI in 12/2021 showed 125cc prostate, PiRADs 2.\par \par He was started on finasteride by Dr Holloway in 4/2022.  Also on doxazosin.\par No dysuria, incontinence, retention, hematuria.  He notes he was having more urinary frequency and urgency and weak stream lately.\par PSA in 5/17/2022 was elevated 29.  Neg UA/cx.  Denies any sexual activity, colonoscopy prior his routine labs with PCP.\par Repeat PSA 5/27/22 28.\par \par Here for f/u.  No significant complaints, no bony pain.  But he has lost 15 lbs over past half year.  He typically only eats twice daily, but he has less appetite.

## 2022-07-12 NOTE — ASSESSMENT
[FreeTextEntry1] : Patient is a 83 yo M who presents with BPH/LUTS and elevated PSA\par \par Recent PSA in the 8-9 range, in May 2020 PSA up to 29\par Given prostate MRI PiRads 2 in 12/2021, would repeat PSA today.  If repeat PSA today remains elevated would refer pt to partner who specializes in prostate biopsy for bx.\par

## 2022-07-14 ENCOUNTER — APPOINTMENT (OUTPATIENT)
Age: 82
End: 2022-07-14

## 2022-07-14 LAB — PSA SERPL-MCNC: 4.8 NG/ML

## 2022-07-22 ENCOUNTER — RX RENEWAL (OUTPATIENT)
Age: 82
End: 2022-07-22

## 2022-07-26 ENCOUNTER — APPOINTMENT (OUTPATIENT)
Dept: NEPHROLOGY | Facility: CLINIC | Age: 82
End: 2022-07-26

## 2022-07-26 VITALS
BODY MASS INDEX: 23.4 KG/M2 | OXYGEN SATURATION: 100 % | HEIGHT: 69 IN | DIASTOLIC BLOOD PRESSURE: 70 MMHG | HEART RATE: 61 BPM | SYSTOLIC BLOOD PRESSURE: 137 MMHG | WEIGHT: 158 LBS | TEMPERATURE: 98.5 F

## 2022-07-26 VITALS — DIASTOLIC BLOOD PRESSURE: 78 MMHG | SYSTOLIC BLOOD PRESSURE: 140 MMHG

## 2022-07-26 DIAGNOSIS — Z87.898 PERSONAL HISTORY OF OTHER SPECIFIED CONDITIONS: ICD-10-CM

## 2022-07-26 PROCEDURE — 99214 OFFICE O/P EST MOD 30 MIN: CPT

## 2022-07-26 NOTE — HISTORY OF PRESENT ILLNESS
[FreeTextEntry1] : JCARLOS PARIKH is a 82 year male with a history of long-standing hypertension, diabetes, chronic kidney disease stage IV for follow-up. \par Renal bx April 2021 with 50% global sclerosis, HTN adaptive changes\par Nocturia and BP improved with addition of Finasteride. Home BPs 120/70s\par Feeling more fatigued lately and loosing weight unintentionally\par Was seen by GI and EGD and colonoscopy recommended. Patient has not scheduled yet. \par PSA elevated in May but now improved at 4.8.

## 2022-07-26 NOTE — REVIEW OF SYSTEMS
[Feeling Tired] : feeling tired [Recent Weight Loss (___ Lbs)] : recent [unfilled] ~Ulb weight loss [Chest Pain] : no chest pain [Lower Ext Edema] : no extremity edema [Shortness Of Breath] : no shortness of breath [Melena] : no melena [As Noted in HPI] : as noted in HPI [Negative] : Heme/Lymph [FreeTextEntry8] : n [de-identified] : Yvette DM

## 2022-07-26 NOTE — PHYSICAL EXAM
[General Appearance - Alert] : alert [General Appearance - In No Acute Distress] : in no acute distress [Sclera] : the sclera and conjunctiva were normal [Outer Ear] : the ears and nose were normal in appearance [Neck Appearance] : the appearance of the neck was normal [Auscultation Breath Sounds / Voice Sounds] : lungs were clear to auscultation bilaterally [Heart Rate And Rhythm] : heart rate was normal and rhythm regular [Heart Sounds] : normal S1 and S2 [Murmurs] : no murmurs [Edema] : there was no peripheral edema [Bowel Sounds] : normal bowel sounds [Abdomen Soft] : soft [No CVA Tenderness] : no ~M costovertebral angle tenderness [Abnormal Walk] : normal gait [] : no rash [No Focal Deficits] : no focal deficits [Oriented To Time, Place, And Person] : oriented to person, place, and time

## 2022-07-28 LAB
25(OH)D3 SERPL-MCNC: 37.4 NG/ML
ALBUMIN SERPL ELPH-MCNC: 4.1 G/DL
ANION GAP SERPL CALC-SCNC: 13 MMOL/L
APPEARANCE: CLEAR
BACTERIA: NEGATIVE
BASOPHILS # BLD AUTO: 0.05 K/UL
BASOPHILS NFR BLD AUTO: 0.8 %
BILIRUBIN URINE: NEGATIVE
BLOOD URINE: NEGATIVE
BUN SERPL-MCNC: 39 MG/DL
CALCIUM SERPL-MCNC: 9.2 MG/DL
CALCIUM SERPL-MCNC: 9.2 MG/DL
CHLORIDE SERPL-SCNC: 105 MMOL/L
CO2 SERPL-SCNC: 22 MMOL/L
COLOR: YELLOW
CREAT SERPL-MCNC: 3.36 MG/DL
CREAT SPEC-SCNC: 138 MG/DL
EGFR: 18 ML/MIN/1.73M2
EOSINOPHIL # BLD AUTO: 0.14 K/UL
EOSINOPHIL NFR BLD AUTO: 2.3 %
ESTIMATED AVERAGE GLUCOSE: 117 MG/DL
FERRITIN SERPL-MCNC: 315 NG/ML
FOLATE SERPL-MCNC: >20 NG/ML
GLUCOSE QUALITATIVE U: NEGATIVE
GLUCOSE SERPL-MCNC: 117 MG/DL
HBA1C MFR BLD HPLC: 5.7 %
HCT VFR BLD CALC: 32.9 %
HGB BLD-MCNC: 10.8 G/DL
HYALINE CASTS: 4 /LPF
IMM GRANULOCYTES NFR BLD AUTO: 0.5 %
IRON SATN MFR SERPL: 26 %
IRON SERPL-MCNC: 68 UG/DL
KETONES URINE: NEGATIVE
LEUKOCYTE ESTERASE URINE: NEGATIVE
LYMPHOCYTES # BLD AUTO: 1.26 K/UL
LYMPHOCYTES NFR BLD AUTO: 20.9 %
MAGNESIUM SERPL-MCNC: 2.4 MG/DL
MAN DIFF?: NORMAL
MCHC RBC-ENTMCNC: 29.1 PG
MCHC RBC-ENTMCNC: 32.8 GM/DL
MCV RBC AUTO: 88.7 FL
MICROALBUMIN 24H UR DL<=1MG/L-MCNC: 386.9 MG/DL
MICROALBUMIN/CREAT 24H UR-RTO: NORMAL MG/G
MICROSCOPIC-UA: NORMAL
MONOCYTES # BLD AUTO: 0.47 K/UL
MONOCYTES NFR BLD AUTO: 7.8 %
NEUTROPHILS # BLD AUTO: 4.08 K/UL
NEUTROPHILS NFR BLD AUTO: 67.7 %
NITRITE URINE: NEGATIVE
PARATHYROID HORMONE INTACT: 137 PG/ML
PH URINE: 6.5
PHOSPHATE SERPL-MCNC: 3.5 MG/DL
PLATELET # BLD AUTO: 135 K/UL
POTASSIUM SERPL-SCNC: 4.8 MMOL/L
PROTEIN URINE: ABNORMAL
RBC # BLD: 3.71 M/UL
RBC # FLD: 15.7 %
RED BLOOD CELLS URINE: 1 /HPF
SODIUM SERPL-SCNC: 140 MMOL/L
SPECIFIC GRAVITY URINE: 1.02
SQUAMOUS EPITHELIAL CELLS: 3 /HPF
TIBC SERPL-MCNC: 267 UG/DL
UIBC SERPL-MCNC: 199 UG/DL
UROBILINOGEN URINE: NORMAL
VIT B12 SERPL-MCNC: 1156 PG/ML
WBC # FLD AUTO: 6.03 K/UL
WHITE BLOOD CELLS URINE: 5 /HPF

## 2022-07-29 ENCOUNTER — NON-APPOINTMENT (OUTPATIENT)
Age: 82
End: 2022-07-29

## 2022-07-29 LAB
DEPRECATED KAPPA LC FREE/LAMBDA SER: 1.79 RATIO
KAPPA LC CSF-MCNC: 4.63 MG/DL
KAPPA LC SERPL-MCNC: 8.28 MG/DL

## 2022-08-08 ENCOUNTER — RX RENEWAL (OUTPATIENT)
Age: 82
End: 2022-08-08

## 2022-08-19 ENCOUNTER — RX RENEWAL (OUTPATIENT)
Age: 82
End: 2022-08-19

## 2022-08-26 ENCOUNTER — RX RENEWAL (OUTPATIENT)
Age: 82
End: 2022-08-26

## 2022-09-20 ENCOUNTER — RESULT REVIEW (OUTPATIENT)
Age: 82
End: 2022-09-20

## 2022-09-20 ENCOUNTER — APPOINTMENT (OUTPATIENT)
Dept: INTERNAL MEDICINE | Facility: CLINIC | Age: 82
End: 2022-09-20

## 2022-09-20 VITALS
DIASTOLIC BLOOD PRESSURE: 70 MMHG | WEIGHT: 155 LBS | SYSTOLIC BLOOD PRESSURE: 148 MMHG | TEMPERATURE: 97.5 F | BODY MASS INDEX: 22.96 KG/M2 | HEART RATE: 58 BPM | HEIGHT: 69 IN | OXYGEN SATURATION: 99 %

## 2022-09-20 DIAGNOSIS — R63.4 ABNORMAL WEIGHT LOSS: ICD-10-CM

## 2022-09-20 PROCEDURE — 90662 IIV NO PRSV INCREASED AG IM: CPT

## 2022-09-20 PROCEDURE — 99214 OFFICE O/P EST MOD 30 MIN: CPT | Mod: 25

## 2022-09-20 PROCEDURE — G0008: CPT

## 2022-09-20 NOTE — ASSESSMENT
[FreeTextEntry1] : Abnormal weight loss of unclear etiology\par To do CT of chest, abdomen, and pelvis = noncontrast to exclude malignancy\par Advised cardiology follow-up with echo\par Advised hepatology follow-up for chronic liver disease\par To do FIT testing/to arrange colonoscopy\par To see hematology given MGUS\par \par HTN\par BP fairly well -controlled \par Continue present regimen\par Renal f/u\par Cardiology f/u /// needs f/u echo for f/u of murmur\par Low sodium diet\par \par Hyperlipidemia\par Lipid profile sent today\par Low fat diet\par Continue Lipitor\par \par Diabetes with associated chronic kidney disease/proteinuria\par HGBA1c and urine for microalbumin sent today\par ADA diet\par Weight control\par Exercise\par Monitor FSG's\par Renal f/u\par Consider endocrine f/u = he continues to refuse\par To see podiatry \par To see ophtho for diabetic eye exam \par Continue Januvia\par \par CKD\par s/p renal biopsy\par Renal f/u\par Monitor creatinine/GFR\par Low potassium diet\par Hematology f/u for MGUS\par HD planned\par \par \par Full labs sent today\par Urine sent today\par Flu vaccine given today\par Up-to-date with pneumococcal vaccination\par COVID booster vaccination encouraged\par Continue meds, diet, exercise as outlined\par F/U with all consulting MD's \par RTC 4 to 6 weeks and as needed \par To call for any medical issues\par All of the above was discussed in detail with him and all of his questions were answered\par Written directions were given\par He verbally confirmed understanding of all of the above and agreement with the above plan

## 2022-09-20 NOTE — HEALTH RISK ASSESSMENT
[Never] : Never [No] : In the past 12 months have you used drugs other than those required for medical reasons? No [No falls in past year] : Patient reported no falls in the past year [0] : 2) Feeling down, depressed, or hopeless: Not at all (0) [PHQ-2 Negative - No further assessment needed] : PHQ-2 Negative - No further assessment needed [With Patient/Caregiver] : , with patient/caregiver [Reviewed no changes] : Reviewed, no changes [Designated Healthcare Proxy] : Designated healthcare proxy [Name: ___] : Health Care Proxy's Name: [unfilled]  [Relationship: ___] : Relationship: [unfilled] [de-identified] : renal, urology, GI [de-identified] : exercises [de-identified] : low salt/low fat/ADA/ low potassium [[id number]] : 0 [AdvancecareDate] : 09/22

## 2022-09-20 NOTE — HISTORY OF PRESENT ILLNESS
[de-identified] : See narrative below. [FreeTextEntry1] : Feeling well. \par Reports that he saw urology for elevated PSA and was felt to have prostatitis.\par Also saw GI for possible colonoscopy which he has put on hold.\par Recently saw nephrology.  He is very upset as he is moving toward hemodialysis given his progressive CKD.\par He is concerned that he has had significant weight loss recently.  He has not been dieting.\par No f/c/s.\par No edema, orthopnea or PND.\par Normal BM.\par Denies abdominal pain, n/v.\par Denies CP, SOB, cough, hemoptysis.\par Denies palpitations or diaphoresis.  No calf pain.\par Denies h/a or visual/speech disturbance.

## 2022-09-20 NOTE — PHYSICAL EXAM
[No Acute Distress] : no acute distress [Well Nourished] : well nourished [Well Developed] : well developed [Well-Appearing] : well-appearing [Normal Voice/Communication] : normal voice/communication [Normal Sclera/Conjunctiva] : normal sclera/conjunctiva [PERRL] : pupils equal round and reactive to light [EOMI] : extraocular movements intact [Normal Outer Ear/Nose] : the outer ears and nose were normal in appearance [No JVD] : no jugular venous distention [Supple] : supple [No Respiratory Distress] : no respiratory distress  [Clear to Auscultation] : lungs were clear to auscultation bilaterally [No Accessory Muscle Use] : no accessory muscle use [Normal Rate] : normal rate  [Regular Rhythm] : with a regular rhythm [Normal S1, S2] : normal S1 and S2 [No Carotid Bruits] : no carotid bruits [No Edema] : there was no peripheral edema [No Extremity Clubbing/Cyanosis] : no extremity clubbing/cyanosis [Declined Breast Exam] : declined breast exam  [Soft] : abdomen soft [Normal Bowel Sounds] : normal bowel sounds [Declined Rectal Exam] : declined rectal exam [No CVA Tenderness] : no CVA  tenderness [No Spinal Tenderness] : no spinal tenderness [No Rash] : no rash [Normal Gait] : normal gait [No Focal Deficits] : no focal deficits [Speech Grossly Normal] : speech grossly normal [Normal Affect] : the affect was normal [Alert and Oriented x3] : oriented to person, place, and time [de-identified] : no ST; wearing full facemask [de-identified] : no stridor [de-identified] : R=16 [de-identified] : Loud murmur unchanged [de-identified] : No cords [de-identified] : As per urology

## 2022-09-20 NOTE — REVIEW OF SYSTEMS
[Vision Problems] : vision problems [Hesitancy] : hesitancy [Nocturia] : nocturia [Negative] : Heme/Lymph [Recent Change In Weight] : ~T recent weight change

## 2022-09-23 LAB
25(OH)D3 SERPL-MCNC: 40.3 NG/ML
ALBUMIN SERPL ELPH-MCNC: 4.5 G/DL
ALP BLD-CCNC: 78 U/L
ALT SERPL-CCNC: 13 U/L
ANION GAP SERPL CALC-SCNC: 14 MMOL/L
AST SERPL-CCNC: 18 U/L
BASOPHILS # BLD AUTO: 0.07 K/UL
BASOPHILS NFR BLD AUTO: 1.2 %
BILIRUB SERPL-MCNC: 0.4 MG/DL
BUN SERPL-MCNC: 39 MG/DL
CALCIUM SERPL-MCNC: 9.6 MG/DL
CALCIUM SERPL-MCNC: 9.6 MG/DL
CHLORIDE SERPL-SCNC: 108 MMOL/L
CHOLEST SERPL-MCNC: 157 MG/DL
CO2 SERPL-SCNC: 22 MMOL/L
COVID-19 NUCLEOCAPSID  GAM ANTIBODY INTERPRETATION: NEGATIVE
COVID-19 SPIKE DOMAIN ANTIBODY INTERPRETATION: POSITIVE
CREAT SERPL-MCNC: 3.72 MG/DL
CREAT SPEC-SCNC: 192 MG/DL
EGFR: 16 ML/MIN/1.73M2
EOSINOPHIL # BLD AUTO: 0.17 K/UL
EOSINOPHIL NFR BLD AUTO: 3 %
ESTIMATED AVERAGE GLUCOSE: 114 MG/DL
FOLATE SERPL-MCNC: 14.5 NG/ML
GLUCOSE SERPL-MCNC: 106 MG/DL
HBA1C MFR BLD HPLC: 5.6 %
HCT VFR BLD CALC: 34.1 %
HDLC SERPL-MCNC: 59 MG/DL
HEMOCCULT STL QL IA: NEGATIVE
HGB BLD-MCNC: 10.9 G/DL
IMM GRANULOCYTES NFR BLD AUTO: 0.2 %
IRON SERPL-MCNC: 85 UG/DL
LDLC SERPL CALC-MCNC: 79 MG/DL
LYMPHOCYTES # BLD AUTO: 1.19 K/UL
LYMPHOCYTES NFR BLD AUTO: 20.7 %
MAGNESIUM SERPL-MCNC: 2.5 MG/DL
MAN DIFF?: NORMAL
MCHC RBC-ENTMCNC: 29.4 PG
MCHC RBC-ENTMCNC: 32 GM/DL
MCV RBC AUTO: 91.9 FL
MICROALBUMIN 24H UR DL<=1MG/L-MCNC: 575.2 MG/DL
MICROALBUMIN/CREAT 24H UR-RTO: NORMAL MG/G
MONOCYTES # BLD AUTO: 0.37 K/UL
MONOCYTES NFR BLD AUTO: 6.4 %
NEUTROPHILS # BLD AUTO: 3.93 K/UL
NEUTROPHILS NFR BLD AUTO: 68.5 %
NONHDLC SERPL-MCNC: 98 MG/DL
PARATHYROID HORMONE INTACT: 97 PG/ML
PHOSPHATE SERPL-MCNC: 3.6 MG/DL
PLATELET # BLD AUTO: 160 K/UL
POTASSIUM SERPL-SCNC: 4 MMOL/L
PROT SERPL-MCNC: 7.2 G/DL
PSA SERPL-MCNC: 4.81 NG/ML
RBC # BLD: 3.71 M/UL
RBC # FLD: 16.9 %
SARS-COV-2 AB SERPL IA-ACNC: >250 U/ML
SARS-COV-2 AB SERPL QL IA: 0.14 INDEX
SODIUM SERPL-SCNC: 144 MMOL/L
TRIGL SERPL-MCNC: 96 MG/DL
TSH SERPL-ACNC: 2.54 UIU/ML
URATE SERPL-MCNC: 6.3 MG/DL
VIT B12 SERPL-MCNC: 984 PG/ML
WBC # FLD AUTO: 5.74 K/UL

## 2022-09-28 LAB
ALBUMIN MFR SERPL ELPH: 56.6 %
ALBUMIN SERPL-MCNC: 4.1 G/DL
ALBUMIN/GLOB SERPL: 1.3 RATIO
ALPHA1 GLOB MFR SERPL ELPH: 4 %
ALPHA1 GLOB SERPL ELPH-MCNC: 0.3 G/DL
ALPHA2 GLOB MFR SERPL ELPH: 13.1 %
ALPHA2 GLOB SERPL ELPH-MCNC: 0.9 G/DL
B-GLOBULIN MFR SERPL ELPH: 10.6 %
B-GLOBULIN SERPL ELPH-MCNC: 0.8 G/DL
GAMMA GLOB FLD ELPH-MCNC: 1.1 G/DL
GAMMA GLOB MFR SERPL ELPH: 15.7 %
INTERPRETATION SERPL IEP-IMP: NORMAL
M PROTEIN MFR SERPL ELPH: 4.5 %
MONOCLON BAND OBS SERPL: 0.3 G/DL
PROT SERPL-MCNC: 7.2 G/DL
PROT SERPL-MCNC: 7.2 G/DL

## 2022-09-29 ENCOUNTER — APPOINTMENT (OUTPATIENT)
Dept: CT IMAGING | Facility: IMAGING CENTER | Age: 82
End: 2022-09-29

## 2022-09-29 ENCOUNTER — OUTPATIENT (OUTPATIENT)
Dept: OUTPATIENT SERVICES | Facility: HOSPITAL | Age: 82
LOS: 1 days | End: 2022-09-29
Payer: MEDICARE

## 2022-09-29 DIAGNOSIS — R63.4 ABNORMAL WEIGHT LOSS: ICD-10-CM

## 2022-09-29 DIAGNOSIS — Z98.89 OTHER SPECIFIED POSTPROCEDURAL STATES: Chronic | ICD-10-CM

## 2022-09-29 PROCEDURE — 71250 CT THORAX DX C-: CPT

## 2022-09-29 PROCEDURE — 74176 CT ABD & PELVIS W/O CONTRAST: CPT | Mod: 26,MH

## 2022-09-29 PROCEDURE — 71250 CT THORAX DX C-: CPT | Mod: 26,MH

## 2022-09-29 PROCEDURE — 74176 CT ABD & PELVIS W/O CONTRAST: CPT

## 2022-10-04 ENCOUNTER — NON-APPOINTMENT (OUTPATIENT)
Age: 82
End: 2022-10-04

## 2022-10-05 ENCOUNTER — APPOINTMENT (OUTPATIENT)
Dept: ULTRASOUND IMAGING | Facility: IMAGING CENTER | Age: 82
End: 2022-10-05

## 2022-10-05 ENCOUNTER — OUTPATIENT (OUTPATIENT)
Dept: OUTPATIENT SERVICES | Facility: HOSPITAL | Age: 82
LOS: 1 days | End: 2022-10-05
Payer: MEDICARE

## 2022-10-05 DIAGNOSIS — N28.9 DISORDER OF KIDNEY AND URETER, UNSPECIFIED: ICD-10-CM

## 2022-10-05 DIAGNOSIS — Z98.89 OTHER SPECIFIED POSTPROCEDURAL STATES: Chronic | ICD-10-CM

## 2022-10-05 PROCEDURE — 76775 US EXAM ABDO BACK WALL LIM: CPT | Mod: 26

## 2022-10-05 PROCEDURE — 76775 US EXAM ABDO BACK WALL LIM: CPT

## 2022-10-17 ENCOUNTER — NON-APPOINTMENT (OUTPATIENT)
Age: 82
End: 2022-10-17

## 2022-10-18 ENCOUNTER — APPOINTMENT (OUTPATIENT)
Dept: NEPHROLOGY | Facility: CLINIC | Age: 82
End: 2022-10-18

## 2022-10-18 VITALS
BODY MASS INDEX: 22.81 KG/M2 | WEIGHT: 154 LBS | DIASTOLIC BLOOD PRESSURE: 70 MMHG | HEIGHT: 69 IN | HEART RATE: 60 BPM | SYSTOLIC BLOOD PRESSURE: 160 MMHG

## 2022-10-18 PROCEDURE — 99214 OFFICE O/P EST MOD 30 MIN: CPT

## 2022-10-19 NOTE — HISTORY OF PRESENT ILLNESS
[FreeTextEntry1] : JCARLOS PARIKH is a 82 year male with a history of long-standing hypertension, diabetes, chronic kidney disease stage IV for follow-up. \par Renal bx April 2021 with 50% global sclerosis, HTN adaptive changes\par Nocturia and BP improved with addition of Finasteride. Home BP 130s \par Less fatigued lately and wt loss stabilized\par PSA improved. He is following with urology regarding renal lesions. Unable to be fully characterized by the non-contrast CT and sono. Pt question of ladonna agent with his CKD

## 2022-10-19 NOTE — ASSESSMENT
[FreeTextEntry1] : 82-year-old with history of chronic kidney disease stage IV, hypertension, diabetes, MGUS\par Chronic kidney disease stage IV: Renal function reviewed with patient. Advanced and has progressed. Will send repeat renal panel today. \par Anemia: trend CBC, \par Mild hyperkalemia: improved on low K diet.\par Hypertension: BP higher than goal. Continue Low Na diet. \par Diabetes: stable\par Proteinuria:ACE for antiproteinuric effect, modest protein diet.\par Hyperlipidemia: per Dr. Baca\par MGUS: follow with Heme- no signs of MGRS on renal biopsy. \par Gout: Now on allopurinol. No gout\par BPH and renal lesions: f/u with Dr. Bullard. Can proceed with MRI with ladonna to determine renal lesions. Need to characterize lesions better. Discussed the new ladonna agents and ability to use in CKD pts. No objection on my end to pursue with ladonna. \par Follow up 3 months\par Continue education with [facility] for decision on modality. PD likely very good candidate. \par

## 2022-10-19 NOTE — REVIEW OF SYSTEMS
[As Noted in HPI] : as noted in HPI [Negative] : Heme/Lymph [Chest Pain] : no chest pain [Lower Ext Edema] : no extremity edema [Shortness Of Breath] : no shortness of breath [Melena] : no melena [de-identified] : Yvette DM

## 2022-10-19 NOTE — PHYSICAL EXAM
[General Appearance - Alert] : alert [General Appearance - In No Acute Distress] : in no acute distress [Sclera] : the sclera and conjunctiva were normal [Outer Ear] : the ears and nose were normal in appearance [Neck Appearance] : the appearance of the neck was normal [Auscultation Breath Sounds / Voice Sounds] : lungs were clear to auscultation bilaterally [Heart Rate And Rhythm] : heart rate was normal and rhythm regular [Heart Sounds] : normal S1 and S2 [Murmurs] : no murmurs [Edema] : there was no peripheral edema [Bowel Sounds] : normal bowel sounds [Abdomen Soft] : soft [No CVA Tenderness] : no ~M costovertebral angle tenderness [Abnormal Walk] : normal gait [] : no rash [No Focal Deficits] : no focal deficits [Oriented To Time, Place, And Person] : oriented to person, place, and time [Affect] : the affect was normal [Mood] : the mood was normal

## 2022-10-20 DIAGNOSIS — N28.9 DISORDER OF KIDNEY AND URETER, UNSPECIFIED: ICD-10-CM

## 2022-10-21 LAB
ALBUMIN SERPL ELPH-MCNC: 4.3 G/DL
ANION GAP SERPL CALC-SCNC: 11 MMOL/L
BUN SERPL-MCNC: 38 MG/DL
CALCIUM SERPL-MCNC: 9.4 MG/DL
CHLORIDE SERPL-SCNC: 106 MMOL/L
CO2 SERPL-SCNC: 24 MMOL/L
CREAT SERPL-MCNC: 3.91 MG/DL
EGFR: 15 ML/MIN/1.73M2
GLUCOSE SERPL-MCNC: 101 MG/DL
PHOSPHATE SERPL-MCNC: 4 MG/DL
POTASSIUM SERPL-SCNC: 4.8 MMOL/L
SODIUM SERPL-SCNC: 140 MMOL/L

## 2022-11-05 ENCOUNTER — RX RENEWAL (OUTPATIENT)
Age: 82
End: 2022-11-05

## 2022-11-16 ENCOUNTER — APPOINTMENT (OUTPATIENT)
Dept: CARDIOLOGY | Facility: CLINIC | Age: 82
End: 2022-11-16

## 2022-11-16 ENCOUNTER — NON-APPOINTMENT (OUTPATIENT)
Age: 82
End: 2022-11-16

## 2022-11-16 VITALS
WEIGHT: 154 LBS | SYSTOLIC BLOOD PRESSURE: 167 MMHG | BODY MASS INDEX: 22.81 KG/M2 | OXYGEN SATURATION: 99 % | HEIGHT: 69 IN | HEART RATE: 62 BPM | DIASTOLIC BLOOD PRESSURE: 79 MMHG

## 2022-11-16 DIAGNOSIS — Z87.891 PERSONAL HISTORY OF NICOTINE DEPENDENCE: ICD-10-CM

## 2022-11-16 PROCEDURE — 99205 OFFICE O/P NEW HI 60 MIN: CPT

## 2022-11-16 PROCEDURE — 93000 ELECTROCARDIOGRAM COMPLETE: CPT

## 2022-11-21 NOTE — PHYSICAL EXAM

## 2022-11-21 NOTE — ASSESSMENT
[FreeTextEntry1] : HTN, with mild concentric LVH and mild diastolic LV dysfunction.\par \par Mild mitral valve insufficiency, mild aortic valve insufficiency.  Mild tricuspid and pulmonic valve insufficiency.\par \par Hx. of palpitations\par \par CRI\par \par DM\par \par HLD

## 2022-11-21 NOTE — REASON FOR VISIT
[FreeTextEntry1] : PRELIMINARY NOTE\par \par Albert Flores presents for cardiac evaluation regarding hypertension, hyperlipidemia and diabetes.

## 2022-11-21 NOTE — HISTORY OF PRESENT ILLNESS
[FreeTextEntry1] : Mr. Flores is 82 years old.  He has a hx. of HTN and HLD as well as DM.  Cardiac history has also included palpitations.\par \par Prior cardiac testing included an echocardiogram in September 2021.  At that time, mild mitral valve insufficiency and mild aortic valve insufficiency were seen.  The left atrium was moderately dilated.  LV systolic function was normal with an ejection fraction of 70%.  Mild concentric LVH was seen with evidence of mild diastolic LV dysfunction.  Doppler assessment also reported mild tricuspid and pulmonic valve insufficiency.\par \par Cardiac stress testing with isotope imaging was performed on August 11, 2014.  At that time, he walked for 9-1/2 minutes on a Colby protocol to a heart rate of 122 bpm.  Perfusion images were unremarkable; left ventricular ejection fraction was 62%.\par \par He also has a history of chronic renal insufficiency and sees Dr. Pam Holloway. \par \par As I see him today, he remains active without limitation.  He reports no episodes of exertional chest discomfort or exertional dyspnea.  He is sometimes aware of increased heart rate with his activities but does not seem to describe palpitations.  There have been no episodes of orthopnea or PND.  He has never suffered loss of consciousness.\par \par His father required a pacemaker later in life but lived to age 92.  There is no other history of any heart disease in immediate family members.  Mr. Flores has a remote history of cigarette use but stopped smoking more than 40 years ago.

## 2022-11-21 NOTE — DISCUSSION/SUMMARY
[EKG obtained to assist in diagnosis and management of assessed problem(s)] : EKG obtained to assist in diagnosis and management of assessed problem(s) [FreeTextEntry1] : I reassured Mr. Flores that his physical exam today shows no significant abnormalities other than mild elevation of his systolic blood pressure.  EKG shows evidence of ventricular hypertrophy but no evidence of prior cardiac injury.\par \par As he sees Dr. Holloway, I will not make any changes to his blood pressure regimen.  He will continue his current meds and will try to adhere to a low salt diet.\par \par As echocardiography was done in September of last year, I do not think he needs to be repeated at this time.\par \par He does have risk factors of coronary artery disease including diabetes mellitus.  A stress testing was last done in 2014, I do think he should return at his convenience for stress test with isotope imaging.\par \par Blood work was checked recently by Dr. Baca with favorable findings overall.\par \par I will call him when the results of the stress test are available; I asked him to return for follow-up visit in 3 months.\par \par 70 minutes spent on today's office visit.

## 2022-11-22 ENCOUNTER — APPOINTMENT (OUTPATIENT)
Dept: INTERNAL MEDICINE | Facility: CLINIC | Age: 82
End: 2022-11-22

## 2022-11-22 ENCOUNTER — LABORATORY RESULT (OUTPATIENT)
Age: 82
End: 2022-11-22

## 2022-11-22 VITALS
HEIGHT: 68.5 IN | BODY MASS INDEX: 23.37 KG/M2 | TEMPERATURE: 97.6 F | WEIGHT: 156 LBS | DIASTOLIC BLOOD PRESSURE: 74 MMHG | OXYGEN SATURATION: 99 % | HEART RATE: 82 BPM | SYSTOLIC BLOOD PRESSURE: 140 MMHG

## 2022-11-22 DIAGNOSIS — Z00.00 ENCOUNTER FOR GENERAL ADULT MEDICAL EXAMINATION W/OUT ABNORMAL FINDINGS: ICD-10-CM

## 2022-11-22 PROCEDURE — G0444 DEPRESSION SCREEN ANNUAL: CPT

## 2022-11-22 PROCEDURE — 36415 COLL VENOUS BLD VENIPUNCTURE: CPT

## 2022-11-22 PROCEDURE — G0439: CPT

## 2022-11-22 RX ORDER — MULTIVITAMIN
TABLET ORAL
Refills: 0 | Status: ACTIVE | COMMUNITY

## 2022-11-22 NOTE — HEALTH RISK ASSESSMENT
[No] : In the past 12 months have you used drugs other than those required for medical reasons? No [No falls in past year] : Patient reported no falls in the past year [0] : 2) Feeling down, depressed, or hopeless: Not at all (0) [PHQ-2 Negative - No further assessment needed] : PHQ-2 Negative - No further assessment needed [Patient declined colonoscopy] : Patient declined colonoscopy [HIV Test offered] : HIV Test offered [Hepatitis C test offered] : Hepatitis C test offered [Behavioral] : behavioral [Alone] : lives alone [# of Members in Household ___] :  household currently consist of [unfilled] member(s) [Retired] : retired [High School] : high school [] :  [# Of Children ___] : has [unfilled] children [Feels Safe at Home] : Feels safe at home [Fully functional (bathing, dressing, toileting, transferring, walking, feeding)] : Fully functional (bathing, dressing, toileting, transferring, walking, feeding) [Fully functional (using the telephone, shopping, preparing meals, housekeeping, doing laundry, using] : Fully functional and needs no help or supervision to perform IADLs (using the telephone, shopping, preparing meals, housekeeping, doing laundry, using transportation, managing medications and managing finances) [Smoke Detector] : smoke detector [Carbon Monoxide Detector] : carbon monoxide detector [Guns at Home] : guns at home [Seat Belt] :  uses seat belt [With Patient/Caregiver] : , with patient/caregiver [Designated Healthcare Proxy] : Designated healthcare proxy [Name: ___] : Health Care Proxy's Name: [unfilled]  [Relationship: ___] : Relationship: [unfilled] [Fair] : ~his/her~ current health as fair  [Good] : ~his/her~  mood as  good [Former] : Former [15-19] : 15-19 [FreeTextEntry1] : ?neuropathy, BP up lately [de-identified] : none [de-identified] : renal, cardiology, urology, GI, ophtho [YearQuit] : age 32 [de-identified] : exercises daily [de-identified] : regular [[id number]] : 0 [Change in mental status noted] : No change in mental status noted [Language] : denies difficulty with language [Behavior] : denies difficulty with behavior [Learning/Retaining New Information] : denies difficulty learning/retaining new information [Handling Complex Tasks] : denies difficulty handling complex tasks [Reasoning] : denies difficulty with reasoning [Spatial Ability and Orientation] : denies difficulty with spatial ability and orientation [Reports changes in hearing] : Reports no changes in hearing [Reports changes in vision] : Reports no changes in vision [Reports changes in dental health] : Reports no changes in dental health [Sunscreen] : does not use sunscreen [Travel to Developing Areas] : does not  travel to developing areas [TB Exposure] : is not being exposed to tuberculosis [Caregiver Concerns] : does not have caregiver concerns [MammogramDate] : NA [PapSmearDate] : NA [BoneDensityDate] : NA [ColonoscopyDate] : 1/2011 [AdvancecareDate] : 11/22

## 2022-11-22 NOTE — REVIEW OF SYSTEMS
[Vision Problems] : vision problems [Joint Pain] : joint pain [Back Pain] : back pain [Negative] : Heme/Lymph [Muscle Pain] : muscle pain

## 2022-11-22 NOTE — HISTORY OF PRESENT ILLNESS
[FreeTextEntry1] : Comes in for annual physical. [de-identified] : Feeling well.\par Denies COVID 19 illness.\par

## 2022-11-22 NOTE — PHYSICAL EXAM
[No Acute Distress] : no acute distress [Well Nourished] : well nourished [Well Developed] : well developed [Well-Appearing] : well-appearing [Normal Voice/Communication] : normal voice/communication [Normal Sclera/Conjunctiva] : normal sclera/conjunctiva [PERRL] : pupils equal round and reactive to light [EOMI] : extraocular movements intact [Normal Outer Ear/Nose] : the outer ears and nose were normal in appearance [No JVD] : no jugular venous distention [Supple] : supple [No Lymphadenopathy] : no lymphadenopathy [No Respiratory Distress] : no respiratory distress  [Clear to Auscultation] : lungs were clear to auscultation bilaterally [No Accessory Muscle Use] : no accessory muscle use [Normal Rate] : normal rate  [Regular Rhythm] : with a regular rhythm [Normal S1, S2] : normal S1 and S2 [No Carotid Bruits] : no carotid bruits [Pedal Pulses Present] : the pedal pulses are present [No Edema] : there was no peripheral edema [No Extremity Clubbing/Cyanosis] : no extremity clubbing/cyanosis [Normal Appearance] : normal in appearance [No Nipple Discharge] : no nipple discharge [No Axillary Lymphadenopathy] : no axillary lymphadenopathy [Soft] : abdomen soft [Non Tender] : non-tender [Non-distended] : non-distended [No Masses] : no abdominal mass palpated [No HSM] : no HSM [Normal Bowel Sounds] : normal bowel sounds [Declined Rectal Exam] : declined rectal exam [Normal Supraclavicular Nodes] : no supraclavicular lymphadenopathy [Normal Axillary Nodes] : no axillary lymphadenopathy [Normal Posterior Cervical Nodes] : no posterior cervical lymphadenopathy [Normal Anterior Cervical Nodes] : no anterior cervical lymphadenopathy [No CVA Tenderness] : no CVA  tenderness [No Spinal Tenderness] : no spinal tenderness [Scoliosis] : scoliosis [Grossly Normal Strength/Tone] : grossly normal strength/tone [No Rash] : no rash [Normal Gait] : normal gait [Coordination Grossly Intact] : coordination grossly intact [No Focal Deficits] : no focal deficits [Speech Grossly Normal] : speech grossly normal [Normal Affect] : the affect was normal [Alert and Oriented x3] : oriented to person, place, and time [de-identified] : no ST; wearing full facemask; right and left TM blocked with cerumen [de-identified] : no stridor or TM [de-identified] : R=16 [de-identified] : murmur unchanged [de-identified] : no cords; normal radial pulses [de-identified] : As per urology [de-identified] : sees podiatry

## 2022-11-22 NOTE — ASSESSMENT
[FreeTextEntry1] : See health maintenance assessment and plan outlined below.\par In addition:\par Full labs and urine testing done today\par Podiatry consult for diabetic foot care 2022\par Hold atorvastatin for 2 weeks to determine if any improvement in lower extremity symptoms/serologies sent\par May need follow-up with Ortho versus vascular versus neurology 2022\par Had colonoscopy 1/31/2011 - due in 10 years =2021 as per GI/GI follow-up for colonoscopy 2022\par Recent FIT testing =  negative September 2022\par Hepatology f/u 2022\par Renal f/u 2022 for end-stage renal disease\par Urology f/u 2022 for elevated PSA\par 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 Agarwal\par Cardiology f/u 2022\par Just had CT of chest/abdomen/pelvis 2022\par Vaccines d/w patient \par He declined Endocrine evaluation for diabetes management\par To see ENT 2022 for cerumen removal\par Dental follow-up 2022\par Ophthalmology evaluation 2022 for diabetic eye care\par Dermatology evaluation 2022 for skin check\par RTC for annual physical \par RTC 3-4 months and as needed\par To call for any medical issues or if his status worsens\par All of the above was discussed in detail with the patient and all of his questions were answered\par He verbally confirmed understanding of all of the above and agreement with the above plan

## 2022-11-28 LAB
25(OH)D3 SERPL-MCNC: 30 NG/ML
AFP-TM SERPL-MCNC: <1.8 NG/ML
ALBUMIN SERPL ELPH-MCNC: 4.3 G/DL
ALP BLD-CCNC: 79 U/L
ALT SERPL-CCNC: 14 U/L
ANION GAP SERPL CALC-SCNC: 13 MMOL/L
APPEARANCE: CLEAR
AST SERPL-CCNC: 18 U/L
BACTERIA: NEGATIVE
BASOPHILS # BLD AUTO: 0.03 K/UL
BASOPHILS NFR BLD AUTO: 0.6 %
BILIRUB SERPL-MCNC: 0.4 MG/DL
BILIRUBIN URINE: NEGATIVE
BLOOD URINE: NEGATIVE
BUN SERPL-MCNC: 40 MG/DL
CALCIUM SERPL-MCNC: 9.6 MG/DL
CALCIUM SERPL-MCNC: 9.6 MG/DL
CCP AB SER IA-ACNC: <8 UNITS
CHLORIDE SERPL-SCNC: 104 MMOL/L
CHOLEST SERPL-MCNC: 179 MG/DL
CK SERPL-CCNC: 136 U/L
CO2 SERPL-SCNC: 23 MMOL/L
COLOR: YELLOW
COVID-19 NUCLEOCAPSID  GAM ANTIBODY INTERPRETATION: NEGATIVE
COVID-19 SPIKE DOMAIN ANTIBODY INTERPRETATION: POSITIVE
CREAT SERPL-MCNC: 4.03 MG/DL
CREAT SPEC-SCNC: 89 MG/DL
CRP SERPL-MCNC: <3 MG/L
DSDNA AB SER-ACNC: <12 IU/ML
EGFR: 14 ML/MIN/1.73M2
EOSINOPHIL # BLD AUTO: 0.12 K/UL
EOSINOPHIL NFR BLD AUTO: 2.2 %
ERYTHROCYTE [SEDIMENTATION RATE] IN BLOOD BY WESTERGREN METHOD: 72 MM/HR
ESTIMATED AVERAGE GLUCOSE: 108 MG/DL
FOLATE SERPL-MCNC: >20 NG/ML
GLUCOSE QUALITATIVE U: NEGATIVE
GLUCOSE SERPL-MCNC: 103 MG/DL
HAV IGM SER QL: NONREACTIVE
HBA1C MFR BLD HPLC: 5.4 %
HBV CORE IGM SER QL: NONREACTIVE
HBV SURFACE AG SER QL: NONREACTIVE
HCT VFR BLD CALC: 33.3 %
HCV AB SER QL: NONREACTIVE
HCV AB SER QL: NONREACTIVE
HCV S/CO RATIO: 0.14 S/CO
HCV S/CO RATIO: 0.15 S/CO
HDLC SERPL-MCNC: 75 MG/DL
HGB BLD-MCNC: 10.2 G/DL
HIV1+2 AB SPEC QL IA.RAPID: NONREACTIVE
HYALINE CASTS: 4 /LPF
IMM GRANULOCYTES NFR BLD AUTO: 0.4 %
IRON SERPL-MCNC: 62 UG/DL
KETONES URINE: NEGATIVE
LDLC SERPL CALC-MCNC: 89 MG/DL
LEUKOCYTE ESTERASE URINE: NEGATIVE
LYMPHOCYTES # BLD AUTO: 1.1 K/UL
LYMPHOCYTES NFR BLD AUTO: 20.2 %
MAGNESIUM SERPL-MCNC: 2.5 MG/DL
MAN DIFF?: NORMAL
MCHC RBC-ENTMCNC: 28.7 PG
MCHC RBC-ENTMCNC: 30.6 GM/DL
MCV RBC AUTO: 93.5 FL
MICROALBUMIN 24H UR DL<=1MG/L-MCNC: 421.9 MG/DL
MICROALBUMIN/CREAT 24H UR-RTO: 4737 MG/G
MICROSCOPIC-UA: NORMAL
MONOCYTES # BLD AUTO: 0.35 K/UL
MONOCYTES NFR BLD AUTO: 6.4 %
NEUTROPHILS # BLD AUTO: 3.82 K/UL
NEUTROPHILS NFR BLD AUTO: 70.2 %
NITRITE URINE: NEGATIVE
NONHDLC SERPL-MCNC: 105 MG/DL
PARATHYROID HORMONE INTACT: 153 PG/ML
PH URINE: 6.5
PHOSPHATE SERPL-MCNC: 4.2 MG/DL
PLATELET # BLD AUTO: 190 K/UL
POTASSIUM SERPL-SCNC: 4.4 MMOL/L
PROT SERPL-MCNC: 7.1 G/DL
PROTEIN URINE: ABNORMAL
PSA SERPL-MCNC: 3.79 NG/ML
RBC # BLD: 3.56 M/UL
RBC # FLD: 16.5 %
RED BLOOD CELLS URINE: 1 /HPF
RF+CCP IGG SER-IMP: NEGATIVE
SARS-COV-2 AB SERPL IA-ACNC: >250 U/ML
SARS-COV-2 AB SERPL QL IA: 0.12 INDEX
SODIUM SERPL-SCNC: 139 MMOL/L
SPECIFIC GRAVITY URINE: 1.01
SQUAMOUS EPITHELIAL CELLS: 3 /HPF
TRIGL SERPL-MCNC: 78 MG/DL
TSH SERPL-ACNC: 2.4 UIU/ML
URATE SERPL-MCNC: 5.6 MG/DL
UROBILINOGEN URINE: NORMAL
VIT B12 SERPL-MCNC: 879 PG/ML
WBC # FLD AUTO: 5.44 K/UL
WHITE BLOOD CELLS URINE: 5 /HPF

## 2022-11-29 LAB
A/G RATIO: 1.1
ALBUMIN: 3.6 G/DL
ALPHA-1-GLOBULIN: 0.2 G/DL
ALPHA-2-GLOBULIN: 1 G/DL
BETA GLOBULIN: 0.9 G/DL
FREE KAPPA LT CHAINS,S: 82.6 MG/L
FREE LAMBDA LT CHAINS,S: 43.7 MG/L
GAMMA GLOBULIN: 1.3 G/DL
GLOBULIN, TOTAL: 3.4 G/DL
IGG SUBSET TOTAL IGG: 1143 MG/DL
IMMUNOFIXATION RESULT, SERUM: ABNORMAL
IMMUNOGLOBULIN A, QN, SERUM: 208 MG/DL
IMMUNOGLOBULIN M, QN, SERUM: 68 MG/DL
KAPPA/LAMBDA RATIO,S: 1.89
Lab: NORMAL
M-SPIKE: 0.6 G/DL
PROTEIN, TOTAL: 7 G/DL

## 2022-12-09 ENCOUNTER — RX RENEWAL (OUTPATIENT)
Age: 82
End: 2022-12-09

## 2023-01-17 ENCOUNTER — RX RENEWAL (OUTPATIENT)
Age: 83
End: 2023-01-17

## 2023-01-18 ENCOUNTER — RX RENEWAL (OUTPATIENT)
Age: 83
End: 2023-01-18

## 2023-02-14 ENCOUNTER — APPOINTMENT (OUTPATIENT)
Dept: NEPHROLOGY | Facility: CLINIC | Age: 83
End: 2023-02-14
Payer: MEDICARE

## 2023-02-14 ENCOUNTER — LABORATORY RESULT (OUTPATIENT)
Age: 83
End: 2023-02-14

## 2023-02-14 VITALS
BODY MASS INDEX: 24.56 KG/M2 | DIASTOLIC BLOOD PRESSURE: 79 MMHG | SYSTOLIC BLOOD PRESSURE: 179 MMHG | WEIGHT: 162.04 LBS | TEMPERATURE: 96.7 F | HEART RATE: 59 BPM | OXYGEN SATURATION: 99 % | HEIGHT: 68 IN

## 2023-02-14 PROCEDURE — 99214 OFFICE O/P EST MOD 30 MIN: CPT

## 2023-02-15 NOTE — REVIEW OF SYSTEMS
[Chest Pain] : no chest pain [Lower Ext Edema] : no extremity edema [Shortness Of Breath] : no shortness of breath [Melena] : no melena [As Noted in HPI] : as noted in HPI [Nocturia] : nocturia [Negative] : Heme/Lymph [de-identified] : Yvette DM

## 2023-02-15 NOTE — ASSESSMENT
[FreeTextEntry1] : 82-year-old with history of chronic kidney disease stage IV, hypertension, diabetes, MGUS\par Chronic kidney disease stage IV: Renal function reviewed with patient. Advanced and has progressed. Discussed at great length conservative vs PD vs hemo. Pt opting to only conservative care and will continue to be supportive in decision making. Son asked many relevant questions and we will all be supportive in his care\par Anemia: trend CBC, \par Mild hyperkalemia: improved on low K diet.\par Hypertension: BP higher than goal. Continue Low Na diet.  Increase torsemide to 20mg daily\par Diabetes: stable\par Proteinuria:ACE for antiproteinuric effect, modest protein diet.\par Hyperlipidemia: per Dr. Baca\par MGUS: follow with Heme- no signs of MGRS on renal biopsy. \par Gout: Now on allopurinol. No gout\par BPH and renal lesions: f/u with Dr. Bullard. MRI no suspicious lesions\par Follow up 2-3 months\par Continue education with [facility] for decision on modality education. \par

## 2023-02-15 NOTE — HISTORY OF PRESENT ILLNESS
[FreeTextEntry1] : JCARLOS PARIKH is a 82 year male with a history of long-standing hypertension, diabetes, chronic kidney disease stage IV for follow-up. \par Medical student in room with me\par He is here with his son who wanted to be supportive with information \par Renal bx April 2021 with 50% global sclerosis, HTN adaptive changes\par BP at home higher as of late\par He has more edema of legs

## 2023-02-15 NOTE — REASON FOR VISIT
[Follow-Up] : a follow-up visit [Family Member] : family member [FreeTextEntry1] : CKD-4; hypertension

## 2023-02-15 NOTE — PHYSICAL EXAM
[General Appearance - Alert] : alert [General Appearance - In No Acute Distress] : in no acute distress [Sclera] : the sclera and conjunctiva were normal [Outer Ear] : the ears and nose were normal in appearance [Neck Appearance] : the appearance of the neck was normal [Auscultation Breath Sounds / Voice Sounds] : lungs were clear to auscultation bilaterally [Heart Rate And Rhythm] : heart rate was normal and rhythm regular [Heart Sounds] : normal S1 and S2 [Murmurs] : no murmurs [FreeTextEntry1] : 1-2+edema [Bowel Sounds] : normal bowel sounds [Abdomen Soft] : soft [No CVA Tenderness] : no ~M costovertebral angle tenderness [Abnormal Walk] : normal gait [] : no rash [No Focal Deficits] : no focal deficits [Oriented To Time, Place, And Person] : oriented to person, place, and time [Affect] : the affect was normal [Mood] : the mood was normal

## 2023-02-17 LAB
25(OH)D3 SERPL-MCNC: 26.8 NG/ML
ALBUMIN SERPL ELPH-MCNC: 4 G/DL
ALP BLD-CCNC: 80 U/L
ALT SERPL-CCNC: 12 U/L
ANION GAP SERPL CALC-SCNC: 14 MMOL/L
AST SERPL-CCNC: 14 U/L
BASOPHILS # BLD AUTO: 0.06 K/UL
BASOPHILS NFR BLD AUTO: 1 %
BILIRUB SERPL-MCNC: 0.2 MG/DL
BUN SERPL-MCNC: 54 MG/DL
CALCIUM SERPL-MCNC: 9.3 MG/DL
CALCIUM SERPL-MCNC: 9.3 MG/DL
CHLORIDE SERPL-SCNC: 105 MMOL/L
CHOLEST SERPL-MCNC: 197 MG/DL
CO2 SERPL-SCNC: 20 MMOL/L
CREAT SERPL-MCNC: 5.55 MG/DL
CREAT SPEC-SCNC: 81 MG/DL
EGFR: 10 ML/MIN/1.73M2
EOSINOPHIL # BLD AUTO: 0.18 K/UL
EOSINOPHIL NFR BLD AUTO: 2.9 %
ESTIMATED AVERAGE GLUCOSE: 108 MG/DL
FERRITIN SERPL-MCNC: 318 NG/ML
GLUCOSE SERPL-MCNC: 99 MG/DL
HBA1C MFR BLD HPLC: 5.4 %
HBV CORE IGG+IGM SER QL: REACTIVE
HBV SURFACE AB SER QL: REACTIVE
HBV SURFACE AG SER QL: NONREACTIVE
HCT VFR BLD CALC: 30.6 %
HCV AB SER QL: NONREACTIVE
HCV S/CO RATIO: 0.09 S/CO
HDLC SERPL-MCNC: 50 MG/DL
HGB BLD-MCNC: 9.9 G/DL
IMM GRANULOCYTES NFR BLD AUTO: 0.3 %
IRON SATN MFR SERPL: 24 %
IRON SERPL-MCNC: 61 UG/DL
LDLC SERPL CALC-MCNC: 116 MG/DL
LYMPHOCYTES # BLD AUTO: 1.36 K/UL
LYMPHOCYTES NFR BLD AUTO: 22.1 %
MAGNESIUM SERPL-MCNC: 2.6 MG/DL
MAN DIFF?: NORMAL
MCHC RBC-ENTMCNC: 29.6 PG
MCHC RBC-ENTMCNC: 32.4 GM/DL
MCV RBC AUTO: 91.3 FL
MICROALBUMIN 24H UR DL<=1MG/L-MCNC: 331.9 MG/DL
MICROALBUMIN/CREAT 24H UR-RTO: 4110 MG/G
MONOCYTES # BLD AUTO: 0.46 K/UL
MONOCYTES NFR BLD AUTO: 7.5 %
NEUTROPHILS # BLD AUTO: 4.08 K/UL
NEUTROPHILS NFR BLD AUTO: 66.2 %
NONHDLC SERPL-MCNC: 148 MG/DL
PARATHYROID HORMONE INTACT: 191 PG/ML
PHOSPHATE SERPL-MCNC: 4.2 MG/DL
PLATELET # BLD AUTO: 181 K/UL
POTASSIUM SERPL-SCNC: 5 MMOL/L
PROT SERPL-MCNC: 6.6 G/DL
RBC # BLD: 3.35 M/UL
RBC # FLD: 16.4 %
SODIUM SERPL-SCNC: 139 MMOL/L
TIBC SERPL-MCNC: 250 UG/DL
TRIGL SERPL-MCNC: 159 MG/DL
UIBC SERPL-MCNC: 189 UG/DL
URATE SERPL-MCNC: 5.8 MG/DL
WBC # FLD AUTO: 6.16 K/UL

## 2023-02-21 ENCOUNTER — NON-APPOINTMENT (OUTPATIENT)
Age: 83
End: 2023-02-21

## 2023-02-21 LAB
M TB IFN-G BLD-IMP: POSITIVE
QUANTIFERON TB PLUS MITOGEN MINUS NIL: >10 IU/ML
QUANTIFERON TB PLUS NIL: 0.02 IU/ML
QUANTIFERON TB PLUS TB1 MINUS NIL: 0.38 IU/ML
QUANTIFERON TB PLUS TB2 MINUS NIL: 0.43 IU/ML

## 2023-02-28 ENCOUNTER — APPOINTMENT (OUTPATIENT)
Dept: CARDIOLOGY | Facility: CLINIC | Age: 83
End: 2023-02-28
Payer: MEDICARE

## 2023-02-28 ENCOUNTER — NON-APPOINTMENT (OUTPATIENT)
Age: 83
End: 2023-02-28

## 2023-02-28 VITALS
RESPIRATION RATE: 17 BRPM | BODY MASS INDEX: 23.49 KG/M2 | HEIGHT: 68 IN | SYSTOLIC BLOOD PRESSURE: 166 MMHG | DIASTOLIC BLOOD PRESSURE: 80 MMHG | OXYGEN SATURATION: 99 % | WEIGHT: 155 LBS | HEART RATE: 62 BPM

## 2023-02-28 DIAGNOSIS — R94.31 ABNORMAL ELECTROCARDIOGRAM [ECG] [EKG]: ICD-10-CM

## 2023-02-28 PROCEDURE — 99214 OFFICE O/P EST MOD 30 MIN: CPT

## 2023-02-28 PROCEDURE — 93000 ELECTROCARDIOGRAM COMPLETE: CPT

## 2023-02-28 NOTE — REASON FOR VISIT
[FreeTextEntry1] : \par Albert Flores presents for cardiac evaluation regarding hypertension, hyperlipidemia and diabetes.

## 2023-02-28 NOTE — PHYSICAL EXAM

## 2023-02-28 NOTE — DISCUSSION/SUMMARY
[EKG obtained to assist in diagnosis and management of assessed problem(s)] : EKG obtained to assist in diagnosis and management of assessed problem(s) [FreeTextEntry1] : HTN, with mild concentric LVH and mild diastolic LV dysfunction - continue meds as directed by Dr. Holloway; torsemide recently increased.\par \par Mild MR, AI, TR and PI - no apparently sxs; continue diuretic.\par \par Palpitations - quiescent at this time.\par \par Discussed stress testing to assess for possible CAD given his risk factors, but he is not interested at this time.  He will continue statin, beta blocker and other current meds.\par \par 35 minutes spent on today's office visit. \par \par I asked him to return for follow-up visit in 4 months.\par \par 70 minutes spent on today's office visit.

## 2023-02-28 NOTE — HISTORY OF PRESENT ILLNESS
[FreeTextEntry1] : He has a hx. of HTN and HLD as well as DM.  Cardiac history has also included palpitations.\par \par Echocardiogram in September 2021 shoed mild MR and AI.  The LA was moderately dilated.  LV systolic function was normal (EF 70%); mild concentric LVH was seen with evidence of mild diastolic LV dysfunction.  Doppler assessment also reported mild tricuspid and pulmonic valve insufficiency.\par \par Cardiac stress testing with isotope imaging was performed on August 11, 2014.  At that time, he walked for 9-1/2 minutes on a Colby protocol to a heart rate of 122 bpm.  Perfusion images were unremarkable; left ventricular ejection fraction was 62%.\par \par He also has a history of chronic renal insufficiency and sees Dr. Pam Holloway. \par \par As he returns today, he remains well from a cardiac standpoint.  He continues his usual activities and describes no episodes of exertional chest discomfort or LAWSON.  He reports no palpitations, and no episodes of orthopnea or PND.  \par \par He continues to f/u with Dr. Holloway.  He tells me that dose of torsemide was recently increased.  Option regarding dialysis were discussed with him recently; at present, he seems inclined to continue current therapy and not pursue dialysis.

## 2023-03-06 ENCOUNTER — APPOINTMENT (OUTPATIENT)
Dept: INFECTIOUS DISEASE | Facility: CLINIC | Age: 83
End: 2023-03-06
Payer: MEDICARE

## 2023-03-06 VITALS
DIASTOLIC BLOOD PRESSURE: 68 MMHG | BODY MASS INDEX: 23.49 KG/M2 | SYSTOLIC BLOOD PRESSURE: 149 MMHG | WEIGHT: 155 LBS | HEART RATE: 63 BPM | TEMPERATURE: 97.6 F | HEIGHT: 68 IN | OXYGEN SATURATION: 98 %

## 2023-03-06 DIAGNOSIS — R76.12 NONSPECIFIC REACTION TO CELL MEDIATED IMMUNITY MEASUREMENT OF GAMMA INTERFERON ANTIGEN RESPONSE W/OUT ACTIVE TUBERCULOSIS: ICD-10-CM

## 2023-03-06 PROCEDURE — 99204 OFFICE O/P NEW MOD 45 MIN: CPT

## 2023-03-07 ENCOUNTER — APPOINTMENT (OUTPATIENT)
Dept: INTERNAL MEDICINE | Facility: CLINIC | Age: 83
End: 2023-03-07
Payer: MEDICARE

## 2023-03-07 VITALS
SYSTOLIC BLOOD PRESSURE: 150 MMHG | HEIGHT: 70 IN | DIASTOLIC BLOOD PRESSURE: 80 MMHG | TEMPERATURE: 98 F | WEIGHT: 157 LBS | OXYGEN SATURATION: 99 % | BODY MASS INDEX: 22.48 KG/M2 | HEART RATE: 57 BPM

## 2023-03-07 PROCEDURE — 99214 OFFICE O/P EST MOD 30 MIN: CPT | Mod: 25

## 2023-03-07 RX ORDER — POLYETHYLENE GLYCOL 3350 AND ELECTROLYTES WITH LEMON FLAVOR 236; 22.74; 6.74; 5.86; 2.97 G/4L; G/4L; G/4L; G/4L; G/4L
236 POWDER, FOR SOLUTION ORAL
Qty: 1 | Refills: 0 | Status: DISCONTINUED | COMMUNITY
Start: 2022-06-30 | End: 2023-03-07

## 2023-03-07 RX ORDER — POLYETHYLENE GLYCOL 3350 17 G/17G
17 POWDER, FOR SOLUTION ORAL DAILY
Qty: 30 | Refills: 3 | Status: DISCONTINUED | COMMUNITY
Start: 2022-06-30 | End: 2023-03-07

## 2023-03-08 NOTE — PHYSICAL EXAM
[No Acute Distress] : no acute distress [Well Nourished] : well nourished [Well Developed] : well developed [Well-Appearing] : well-appearing [Normal Voice/Communication] : normal voice/communication [Normal Sclera/Conjunctiva] : normal sclera/conjunctiva [PERRL] : pupils equal round and reactive to light [EOMI] : extraocular movements intact [Normal Outer Ear/Nose] : the outer ears and nose were normal in appearance [Normal TMs] : both tympanic membranes were normal [No JVD] : no jugular venous distention [Supple] : supple [No Respiratory Distress] : no respiratory distress  [Clear to Auscultation] : lungs were clear to auscultation bilaterally [No Accessory Muscle Use] : no accessory muscle use [Normal Rate] : normal rate  [Regular Rhythm] : with a regular rhythm [Normal S1, S2] : normal S1 and S2 [No Carotid Bruits] : no carotid bruits [No Edema] : there was no peripheral edema [No Extremity Clubbing/Cyanosis] : no extremity clubbing/cyanosis [Declined Breast Exam] : declined breast exam  [Soft] : abdomen soft [Normal Bowel Sounds] : normal bowel sounds [Declined Rectal Exam] : declined rectal exam [No CVA Tenderness] : no CVA  tenderness [No Spinal Tenderness] : no spinal tenderness [No Rash] : no rash [Normal Gait] : normal gait [No Focal Deficits] : no focal deficits [Speech Grossly Normal] : speech grossly normal [Normal Affect] : the affect was normal [Alert and Oriented x3] : oriented to person, place, and time [de-identified] : no ST; wearing full facemask [de-identified] : no stridor [de-identified] : R=16 [de-identified] : Loud murmur unchanged [de-identified] : No cords [de-identified] : As per urology

## 2023-03-08 NOTE — HEALTH RISK ASSESSMENT
[No] : In the past 12 months have you used drugs other than those required for medical reasons? No [No falls in past year] : Patient reported no falls in the past year [0] : 2) Feeling down, depressed, or hopeless: Not at all (0) [PHQ-2 Negative - No further assessment needed] : PHQ-2 Negative - No further assessment needed [de-identified] : renal, infectious diseases [de-identified] : exercises [de-identified] : low salt/low fat/ADA/ low potassium [[id number]] : 0 [Reviewed no changes] : Reviewed, no changes [With Patient/Caregiver] : , with patient/caregiver [Designated Healthcare Proxy] : Designated healthcare proxy [Name: ___] : Health Care Proxy's Name: [unfilled]  [Relationship: ___] : Relationship: [unfilled] [AdvancecareDate] : 03/23 [Former] : Former

## 2023-03-08 NOTE — ASSESSMENT
[FreeTextEntry1] : HTN\par BP fairly well -controlled \par Continue present regimen as per Dr. Holloway\par Renal f/u\par Cardiology f/u /// needs f/u echo\par Low sodium diet\par \par Hyperlipidemia\par Lipid profile sent today\par Low fat diet\par Lipitor on hold as per nephrology\par \par Diabetes with associated chronic kidney disease/proteinuria\par HGBA1c and urine for microalbumin sent today\par ADA diet\par Weight control\par Exercise\par Monitor FSG's\par Renal f/u\par Consider endocrine f/u = he continues to refuse\par To see podiatry \par To see ophtho for diabetic eye exam \par Continue Januvia\par \par CKD\par s/p renal biopsy\par Renal f/u\par Monitor creatinine/GFR\par Low potassium diet\par Hematology f/u for MGUS\par I spoke with the patient regarding dialysis options for approximately 15 minutes///he is still considering his options\par \par Hepatitis B = chronic carrier\par Monitor hepatitis serologies\par Monitor AFP level\par Monitor liver ultrasound\par Hepatology follow-up advised\par \par Recent positive QuantiFERON gold testing done in preparation for possible dialysis\par He is asymptomatic\par Chest x-ray planned\par Seeing ID\par \par Thrombocytopenia\par Chronic issue\par ?  Related to chronic liver disease\par ?  Related to MGUS or other hematologic etiology\par Continue to monitor level and coagulation parameters\par Hepatology follow-up as above\par Hematology follow-up as above\par \par \par Full labs sent today\par Urine sent today\par Up-to-date with influenza and pneumococcal vaccination\par COVID booster vaccination encouraged\par Continue meds, diet, exercise as outlined\par F/U with all consulting MD's \par RTC 6 weeks and as needed \par To call for any medical issues\par All of the above was discussed in detail with him and all of his questions were answered\par He verbally confirmed understanding of all of the above and agreement with the above plan

## 2023-03-08 NOTE — HISTORY OF PRESENT ILLNESS
[de-identified] : See narrative below. [FreeTextEntry1] : Feeling well. \par Recently saw nephrology.  He is moving toward dialysis given his progressive CKD.\par He has been encouraged by nephrology to consider peritoneal dialysis in the home.  He states that he is not in favor of moving forward with dialysis.  He understands that refusal of either peritoneal or hemodialysis will result in progressive renal failure and death.  He states that he is at peace with this and feels that if it is his time to die he will accept this.  He states that he does not want his quality of life to diminish and he does not want to be a burden to anyone.\par No f/c/s.\par No edema, orthopnea or PND.\par Normal BM.  Reports usual urine output.\par Denies abdominal pain, n/v.\par Denies CP, SOB, cough, hemoptysis.\par Denies palpitations or diaphoresis.  No calf pain.\par Denies h/a or visual/speech disturbance.\par He reportedly has had positive QuantiFERON gold testing done in preparation for dialysis.  He is going for a chest x-ray.  He reports that he recently was evaluated by infectious diseases.

## 2023-03-15 LAB
ALBUMIN SERPL ELPH-MCNC: 4 G/DL
ANION GAP SERPL CALC-SCNC: 14 MMOL/L
BASOPHILS # BLD AUTO: 0.06 K/UL
BASOPHILS NFR BLD AUTO: 0.9 %
BUN SERPL-MCNC: 61 MG/DL
CALCIUM SERPL-MCNC: 9.5 MG/DL
CHLORIDE SERPL-SCNC: 104 MMOL/L
CO2 SERPL-SCNC: 21 MMOL/L
CREAT SERPL-MCNC: 5.84 MG/DL
EGFR: 9 ML/MIN/1.73M2
EOSINOPHIL # BLD AUTO: 0.18 K/UL
EOSINOPHIL NFR BLD AUTO: 2.6 %
GLUCOSE SERPL-MCNC: 120 MG/DL
HCT VFR BLD CALC: 32.4 %
HGB BLD-MCNC: 10.4 G/DL
IMM GRANULOCYTES NFR BLD AUTO: 0.3 %
LYMPHOCYTES # BLD AUTO: 1.35 K/UL
LYMPHOCYTES NFR BLD AUTO: 19.5 %
MAN DIFF?: NORMAL
MCHC RBC-ENTMCNC: 29.7 PG
MCHC RBC-ENTMCNC: 32.1 GM/DL
MCV RBC AUTO: 92.6 FL
MONOCYTES # BLD AUTO: 0.48 K/UL
MONOCYTES NFR BLD AUTO: 6.9 %
NEUTROPHILS # BLD AUTO: 4.85 K/UL
NEUTROPHILS NFR BLD AUTO: 69.8 %
PHOSPHATE SERPL-MCNC: 4.4 MG/DL
PLATELET # BLD AUTO: 169 K/UL
POTASSIUM SERPL-SCNC: 4.6 MMOL/L
RBC # BLD: 3.5 M/UL
RBC # FLD: 16.6 %
SODIUM SERPL-SCNC: 139 MMOL/L
WBC # FLD AUTO: 6.94 K/UL

## 2023-03-21 ENCOUNTER — OUTPATIENT (OUTPATIENT)
Dept: OUTPATIENT SERVICES | Facility: HOSPITAL | Age: 83
LOS: 1 days | End: 2023-03-21
Payer: MEDICARE

## 2023-03-21 ENCOUNTER — APPOINTMENT (OUTPATIENT)
Dept: RADIOLOGY | Facility: IMAGING CENTER | Age: 83
End: 2023-03-21
Payer: MEDICARE

## 2023-03-21 DIAGNOSIS — Z98.89 OTHER SPECIFIED POSTPROCEDURAL STATES: Chronic | ICD-10-CM

## 2023-03-21 DIAGNOSIS — R76.12 NONSPECIFIC REACTION TO CELL MEDIATED IMMUNITY MEASUREMENT OF GAMMA INTERFERON ANTIGEN RESPONSE WITHOUT ACTIVE TUBERCULOSIS: ICD-10-CM

## 2023-03-21 PROCEDURE — 71046 X-RAY EXAM CHEST 2 VIEWS: CPT

## 2023-03-21 PROCEDURE — 71046 X-RAY EXAM CHEST 2 VIEWS: CPT | Mod: 26

## 2023-03-28 PROBLEM — R76.12 POSITIVE QUANTIFERON-TB GOLD TEST: Status: ACTIVE | Noted: 2023-02-21

## 2023-03-28 NOTE — CONSULT LETTER
[Dear  ___] : Dear  [unfilled], [Consult Letter:] : I had the pleasure of evaluating your patient, [unfilled]. [Please see my note below.] : Please see my note below. [Consult Closing:] : Thank you very much for allowing me to participate in the care of this patient.  If you have any questions, please do not hesitate to contact me. [Sincerely,] : Sincerely, [FreeTextEntry2] : Pam Holloway MD\par [address], [place] [address] [FreeTextEntry3] : Ronan Mclaughlin MD\par Attending Physician\par Division of Infectious Disease, [facility]\par [address] [place] [address] Drive\[place] [address]\par [phone]

## 2023-03-28 NOTE — PHYSICAL EXAM
[General Appearance - Alert] : alert [General Appearance - In No Acute Distress] : in no acute distress [Sclera] : the sclera and conjunctiva were normal [Heart Rate And Rhythm] : heart rate was normal and rhythm regular [Edema] : there was no peripheral edema [Bowel Sounds] : normal bowel sounds [No Palpable Adenopathy] : no palpable adenopathy [Musculoskeletal - Swelling] : no joint swelling [] : no rash [Oriented To Time, Place, And Person] : oriented to person, place, and time [Affect] : the affect was normal

## 2023-03-28 NOTE — HISTORY OF PRESENT ILLNESS
[FreeTextEntry1] : 83 yo M with HLD, HLD, chronic Hep B, DM, under eval for hemodialysis, presenting with positive quant gold\par \par No cough/sob\par Feels gold, but no fears, no night sweats\par No weight loss\par \par Born [facility], came when 12 years\par No family members with TB\par Not sure if got BCG\par Never lived overseas again\par Never incarcerated\par 
no paresthesias/no loss of consciousness/no loss of sensation/no difficulty walking/no weakness/no transient paralysis/no vertigo

## 2023-03-28 NOTE — ASSESSMENT
[FreeTextEntry1] : 81 yo M with HLD, HLD, chronic Hep B, DM, under eval for hemodialysis, presenting with positive quant gold\par Born in [facility], received BCG\par No signs of active TB, no family members with TB/no exposures\par Quant gold positive\par Patient states he is currently not planning to undergo HD\par Given risks/benefits in setting of advanced age (with no plans for HD), likely minimal benefit to pursuing treatment at this point\par Overall, Latent TB, CKD\par - Monitor off abx\par - Check CXR to ensure no signs of active TB\par - At this point, as long as patient not pursuing HD, suspect that risks of latent TB treatment would be higher than benefits, given advanced age; discussed with patient, he agrees with this conservative approach and understands risks/benefits\par - Follow up in ID clinic PRN\par \par ***Note CXR clear without signs infection, discussed with patient\par \par 60 mins spent in care of patient, review of records, eval, documentation

## 2023-04-03 ENCOUNTER — RX RENEWAL (OUTPATIENT)
Age: 83
End: 2023-04-03

## 2023-04-18 LAB
ALBUMIN SERPL ELPH-MCNC: 4 G/DL
ANION GAP SERPL CALC-SCNC: 15 MMOL/L
BASOPHILS # BLD AUTO: 0.05 K/UL
BASOPHILS NFR BLD AUTO: 0.9 %
BUN SERPL-MCNC: 62 MG/DL
CALCIUM SERPL-MCNC: 9.1 MG/DL
CHLORIDE SERPL-SCNC: 107 MMOL/L
CO2 SERPL-SCNC: 19 MMOL/L
CREAT SERPL-MCNC: 5.89 MG/DL
EGFR: 9 ML/MIN/1.73M2
EOSINOPHIL # BLD AUTO: 0.19 K/UL
EOSINOPHIL NFR BLD AUTO: 3.5 %
GLUCOSE SERPL-MCNC: 94 MG/DL
HCT VFR BLD CALC: 29.3 %
HGB BLD-MCNC: 9.2 G/DL
IMM GRANULOCYTES NFR BLD AUTO: 0.6 %
LYMPHOCYTES # BLD AUTO: 1.56 K/UL
LYMPHOCYTES NFR BLD AUTO: 28.7 %
MAN DIFF?: NORMAL
MCHC RBC-ENTMCNC: 28.8 PG
MCHC RBC-ENTMCNC: 31.4 GM/DL
MCV RBC AUTO: 91.8 FL
MONOCYTES # BLD AUTO: 0.49 K/UL
MONOCYTES NFR BLD AUTO: 9 %
NEUTROPHILS # BLD AUTO: 3.12 K/UL
NEUTROPHILS NFR BLD AUTO: 57.3 %
PHOSPHATE SERPL-MCNC: 4.5 MG/DL
PLATELET # BLD AUTO: 149 K/UL
POTASSIUM SERPL-SCNC: 4.4 MMOL/L
RBC # BLD: 3.19 M/UL
RBC # FLD: 15.9 %
SODIUM SERPL-SCNC: 141 MMOL/L
WBC # FLD AUTO: 5.44 K/UL

## 2023-04-20 ENCOUNTER — RX RENEWAL (OUTPATIENT)
Age: 83
End: 2023-04-20

## 2023-04-26 ENCOUNTER — APPOINTMENT (OUTPATIENT)
Dept: NEPHROLOGY | Facility: CLINIC | Age: 83
End: 2023-04-26
Payer: MEDICARE

## 2023-04-26 VITALS
HEART RATE: 56 BPM | BODY MASS INDEX: 21.94 KG/M2 | OXYGEN SATURATION: 100 % | WEIGHT: 153.22 LBS | DIASTOLIC BLOOD PRESSURE: 77 MMHG | SYSTOLIC BLOOD PRESSURE: 174 MMHG | HEIGHT: 70 IN | TEMPERATURE: 97.2 F

## 2023-04-26 VITALS — SYSTOLIC BLOOD PRESSURE: 158 MMHG | DIASTOLIC BLOOD PRESSURE: 64 MMHG

## 2023-04-26 PROCEDURE — 96372 THER/PROPH/DIAG INJ SC/IM: CPT

## 2023-04-26 RX ORDER — DARBEPOETIN ALFA 40 UG/ML
40 SOLUTION INTRAVENOUS; SUBCUTANEOUS
Refills: 0 | Status: COMPLETED | OUTPATIENT
Start: 2023-04-26

## 2023-04-26 RX ADMIN — DARBEPOETIN ALFA 1 MCG/ML: 40 SOLUTION INTRAVENOUS; SUBCUTANEOUS at 00:00

## 2023-04-26 NOTE — PROCEDURE
[FreeTextEntry1] : Hgb 9.2/ Hct 29.3\par BP improved on repeat. Pt has not increased his Torsemide as directed by Dr. Holloway on last visit. He will start today. \par Aranesp 40mcg SQ x 1 today. \par Pt tolerated procedure well\par Labs in one month at PMD\par Follow up with Dr. Holloway in May/June

## 2023-05-15 LAB
ALBUMIN SERPL ELPH-MCNC: 4.1 G/DL
ANION GAP SERPL CALC-SCNC: 16 MMOL/L
BASOPHILS # BLD AUTO: 0.04 K/UL
BASOPHILS NFR BLD AUTO: 0.7 %
BUN SERPL-MCNC: 58 MG/DL
CALCIUM SERPL-MCNC: 9.1 MG/DL
CHLORIDE SERPL-SCNC: 105 MMOL/L
CO2 SERPL-SCNC: 19 MMOL/L
CREAT SERPL-MCNC: 4.84 MG/DL
EGFR: 11 ML/MIN/1.73M2
EOSINOPHIL # BLD AUTO: 0.12 K/UL
EOSINOPHIL NFR BLD AUTO: 2.1 %
GLUCOSE SERPL-MCNC: 94 MG/DL
HCT VFR BLD CALC: 32 %
HGB BLD-MCNC: 10.2 G/DL
IMM GRANULOCYTES NFR BLD AUTO: 0.5 %
LYMPHOCYTES # BLD AUTO: 1.29 K/UL
LYMPHOCYTES NFR BLD AUTO: 22.5 %
MAN DIFF?: NORMAL
MCHC RBC-ENTMCNC: 29.7 PG
MCHC RBC-ENTMCNC: 31.9 GM/DL
MCV RBC AUTO: 93 FL
MONOCYTES # BLD AUTO: 0.44 K/UL
MONOCYTES NFR BLD AUTO: 7.7 %
NEUTROPHILS # BLD AUTO: 3.82 K/UL
NEUTROPHILS NFR BLD AUTO: 66.5 %
PHOSPHATE SERPL-MCNC: 5.3 MG/DL
PLATELET # BLD AUTO: 142 K/UL
POTASSIUM SERPL-SCNC: 4.6 MMOL/L
RBC # BLD: 3.44 M/UL
RBC # FLD: 16.7 %
SODIUM SERPL-SCNC: 140 MMOL/L
WBC # FLD AUTO: 5.74 K/UL

## 2023-06-06 ENCOUNTER — APPOINTMENT (OUTPATIENT)
Dept: INTERNAL MEDICINE | Facility: CLINIC | Age: 83
End: 2023-06-06
Payer: MEDICARE

## 2023-06-06 VITALS
HEIGHT: 69 IN | SYSTOLIC BLOOD PRESSURE: 160 MMHG | BODY MASS INDEX: 22.66 KG/M2 | WEIGHT: 153 LBS | DIASTOLIC BLOOD PRESSURE: 68 MMHG | TEMPERATURE: 98.6 F | HEART RATE: 61 BPM | OXYGEN SATURATION: 99 %

## 2023-06-06 PROCEDURE — 99214 OFFICE O/P EST MOD 30 MIN: CPT | Mod: 25

## 2023-06-08 NOTE — REVIEW OF SYSTEMS
[Vision Problems] : vision problems [Nocturia] : nocturia [Negative] : Heme/Lymph [Fatigue] : fatigue [Recent Change In Weight] : ~T recent weight change

## 2023-06-08 NOTE — HEALTH RISK ASSESSMENT
[No] : In the past 12 months have you used drugs other than those required for medical reasons? No [No falls in past year] : Patient reported no falls in the past year [0] : 2) Feeling down, depressed, or hopeless: Not at all (0) [PHQ-2 Negative - No further assessment needed] : PHQ-2 Negative - No further assessment needed [With Patient/Caregiver] : , with patient/caregiver [Reviewed no changes] : Reviewed, no changes [Designated Healthcare Proxy] : Designated healthcare proxy [Name: ___] : Health Care Proxy's Name: [unfilled]  [Relationship: ___] : Relationship: [unfilled] [Former] : Former [de-identified] : Nephrology [de-identified] : exercises [de-identified] : low salt/low fat/ADA/ low potassium [[id number]] : 0 [AdvancecareDate] : 06/23

## 2023-06-08 NOTE — HISTORY OF PRESENT ILLNESS
[de-identified] : See narrative below. [FreeTextEntry1] : Feeling well. However, he is weak at times and reports that his exercise tolerance is diminished.  He must rest between activities.  He states that he is not in favor of moving forward with dialysis.  He states that he is at peace with his decision and feels that if it is his time to die he will accept this.  He states that he does not want his quality of life to diminish and he does not want to be a burden to anyone.  He has discussed this with his family.  He reports that all of his children and grandchildren and great-grandchildren were recently into visit with him.\par No f/c/s.\par No edema, orthopnea or PND.\par Normal BM.  Reports usual urine output.\par Denies abdominal pain, n/v.  Appetite fair.\par Denies CP, SOB, cough, hemoptysis.\par Denies palpitations or diaphoresis.  No calf pain.\par Denies h/a or visual/speech disturbance.\par Reports that he was given an Epogen injection with nephrology which made him feel stronger and better.  He hopes these will continue.

## 2023-06-08 NOTE — ASSESSMENT
[FreeTextEntry1] : HTN\par BP fairly well -controlled \par Continue present regimen as per Dr. Holloway\par Renal f/u\par Cardiology f/u /// needs f/u echo\par Low sodium diet\par \par Hyperlipidemia\par Lipid profile sent today\par Low fat diet\par Lipitor on hold as per nephrology\par \par Diabetes with associated chronic kidney disease/proteinuria\par HGBA1c and urine for microalbumin sent today\par ADA diet\par Weight control\par Exercise\par Monitor FSG's\par Renal f/u\par He has declined endocrine evaluation\par To see podiatry \par To see ophtho for diabetic eye exam \par Continue Januvia\par \par CKD\par He has declined dialysis\par Renal f/u\par Monitor creatinine/GFR\par Epogen as per renal\par Low potassium diet\par Hematology f/u for MGUS\par \par \par Hepatitis B = chronic carrier\par Monitor hepatitis serologies\par Monitor AFP level\par Monitor liver ultrasound\par Hepatology follow-up advised\par \par Thrombocytopenia\par Chronic issue\par ?  Related to chronic liver disease\par ?  Related to MGUS or other hematologic etiology\par Continue to monitor level and coagulation parameters\par Hepatology follow-up as above\par Hematology follow-up as above\par \par \par Full labs and urine testing done today at patient's request\par Up-to-date with influenza and pneumococcal vaccination\par COVID booster vaccination encouraged\par Continue meds, diet, exercise as outlined\par F/U with all consulting MD's \par RTC 6 weeks and as needed \par To call for any medical issues\par All of the above was discussed in detail with him and all of his questions were answered\par He verbally confirmed understanding of all of the above and agreement with the above plan

## 2023-06-08 NOTE — PHYSICAL EXAM
[No Acute Distress] : no acute distress [Well Nourished] : well nourished [Well Developed] : well developed [Well-Appearing] : well-appearing [Normal Voice/Communication] : normal voice/communication [Normal Sclera/Conjunctiva] : normal sclera/conjunctiva [PERRL] : pupils equal round and reactive to light [EOMI] : extraocular movements intact [Normal Outer Ear/Nose] : the outer ears and nose were normal in appearance [Normal TMs] : both tympanic membranes were normal [No JVD] : no jugular venous distention [Supple] : supple [No Respiratory Distress] : no respiratory distress  [Clear to Auscultation] : lungs were clear to auscultation bilaterally [No Accessory Muscle Use] : no accessory muscle use [Normal Rate] : normal rate  [Regular Rhythm] : with a regular rhythm [Normal S1, S2] : normal S1 and S2 [No Carotid Bruits] : no carotid bruits [No Edema] : there was no peripheral edema [No Extremity Clubbing/Cyanosis] : no extremity clubbing/cyanosis [Declined Breast Exam] : declined breast exam  [Soft] : abdomen soft [Normal Bowel Sounds] : normal bowel sounds [Declined Rectal Exam] : declined rectal exam [No CVA Tenderness] : no CVA  tenderness [No Spinal Tenderness] : no spinal tenderness [No Rash] : no rash [Normal Gait] : normal gait [No Focal Deficits] : no focal deficits [Speech Grossly Normal] : speech grossly normal [Normal Affect] : the affect was normal [Alert and Oriented x3] : oriented to person, place, and time [Normal Oropharynx] : the oropharynx was normal [de-identified] : no ST; wearing full facemask [de-identified] : no stridor [de-identified] : R=16 [de-identified] : Loud murmur unchanged [de-identified] : No cords [de-identified] : As per urology

## 2023-06-09 LAB
25(OH)D3 SERPL-MCNC: 30.4 NG/ML
ALBUMIN SERPL ELPH-MCNC: 4.3 G/DL
ALP BLD-CCNC: 85 U/L
ALT SERPL-CCNC: 14 U/L
ANION GAP SERPL CALC-SCNC: 13 MMOL/L
APPEARANCE: CLEAR
AST SERPL-CCNC: 13 U/L
BACTERIA: NEGATIVE /HPF
BILIRUB SERPL-MCNC: 0.3 MG/DL
BILIRUBIN URINE: NEGATIVE
BLOOD URINE: NEGATIVE
BUN SERPL-MCNC: 57 MG/DL
CALCIUM SERPL-MCNC: 9.3 MG/DL
CALCIUM SERPL-MCNC: 9.3 MG/DL
CAST: 1 /LPF
CHLORIDE SERPL-SCNC: 105 MMOL/L
CHOLEST SERPL-MCNC: 208 MG/DL
CO2 SERPL-SCNC: 20 MMOL/L
COLOR: YELLOW
CREAT SERPL-MCNC: 5.22 MG/DL
CREAT SPEC-SCNC: 63 MG/DL
EGFR: 10 ML/MIN/1.73M2
EPITHELIAL CELLS: 0 /HPF
ESTIMATED AVERAGE GLUCOSE: 108 MG/DL
FERRITIN SERPL-MCNC: 324 NG/ML
FOLATE SERPL-MCNC: 18.2 NG/ML
GLUCOSE QUALITATIVE U: NEGATIVE MG/DL
GLUCOSE SERPL-MCNC: 113 MG/DL
HBA1C MFR BLD HPLC: 5.4 %
HDLC SERPL-MCNC: 54 MG/DL
IRON SATN MFR SERPL: 32 %
IRON SERPL-MCNC: 80 UG/DL
KETONES URINE: NEGATIVE MG/DL
LDLC SERPL CALC-MCNC: 124 MG/DL
LEUKOCYTE ESTERASE URINE: NEGATIVE
MAGNESIUM SERPL-MCNC: 2.6 MG/DL
MICROALBUMIN 24H UR DL<=1MG/L-MCNC: 208.1 MG/DL
MICROALBUMIN/CREAT 24H UR-RTO: 3312 MG/G
MICROSCOPIC-UA: NORMAL
NITRITE URINE: NEGATIVE
NONHDLC SERPL-MCNC: 155 MG/DL
PARATHYROID HORMONE INTACT: 209 PG/ML
PH URINE: 6
PHOSPHATE SERPL-MCNC: 4.9 MG/DL
POTASSIUM SERPL-SCNC: 4.8 MMOL/L
PROT SERPL-MCNC: 6.8 G/DL
PROTEIN URINE: 300 MG/DL
RED BLOOD CELLS URINE: 0 /HPF
SODIUM SERPL-SCNC: 138 MMOL/L
SPECIFIC GRAVITY URINE: 1.01
TIBC SERPL-MCNC: 249 UG/DL
TRIGL SERPL-MCNC: 155 MG/DL
TSH SERPL-ACNC: 2.72 UIU/ML
UIBC SERPL-MCNC: 169 UG/DL
URATE SERPL-MCNC: 4.7 MG/DL
UROBILINOGEN URINE: 0.2 MG/DL
VIT B12 SERPL-MCNC: 1021 PG/ML
WHITE BLOOD CELLS URINE: 1 /HPF

## 2023-06-12 ENCOUNTER — APPOINTMENT (OUTPATIENT)
Dept: NEPHROLOGY | Facility: CLINIC | Age: 83
End: 2023-06-12
Payer: MEDICARE

## 2023-06-12 VITALS
HEIGHT: 69 IN | OXYGEN SATURATION: 97 % | TEMPERATURE: 97.5 F | SYSTOLIC BLOOD PRESSURE: 157 MMHG | BODY MASS INDEX: 22.86 KG/M2 | WEIGHT: 154.32 LBS | DIASTOLIC BLOOD PRESSURE: 81 MMHG | HEART RATE: 66 BPM

## 2023-06-12 VITALS — DIASTOLIC BLOOD PRESSURE: 70 MMHG | SYSTOLIC BLOOD PRESSURE: 142 MMHG

## 2023-06-12 DIAGNOSIS — E79.0 HYPERURICEMIA W/OUT SIGNS OF INFLAMMATORY ARTHRITIS AND TOPHACEOUS DISEASE: ICD-10-CM

## 2023-06-12 DIAGNOSIS — N18.4 CHRONIC KIDNEY DISEASE, STAGE 4 (SEVERE): ICD-10-CM

## 2023-06-12 PROCEDURE — 99214 OFFICE O/P EST MOD 30 MIN: CPT

## 2023-06-12 NOTE — REVIEW OF SYSTEMS
[Negative] : Heme/Lymph [As Noted in HPI] : as noted in HPI [Chest Pain] : no chest pain [Lower Ext Edema] : no extremity edema [Shortness Of Breath] : no shortness of breath [Melena] : no melena [Nocturia] : nocturia [FreeTextEntry8] : 2 x nightly [de-identified] : Yvette DM

## 2023-06-12 NOTE — ASSESSMENT
[FreeTextEntry1] : 83-year-old with history of chronic kidney disease stage IV, hypertension, diabetes, MGUS\par Chronic kidney disease stage V: Renal function reviewed with patient. Advanced and has progressed. Pt opting to only conservative care and will continue to be supportive in decision making.\par Anemia: trend CBC, improved\par Mild hyperkalemia: improved on low K diet.\par Hypertension: BP at goal. Continue low Na diet. May take extra Torsemide as needed for swelling/weight gain. \par Diabetes: stable\par Proteinuria:ACE for antiproteinuric effect, modest protein diet.\par Hyperlipidemia: per Dr. Baca\par MGUS: follow with Heme- no signs of MGRS on renal biopsy. \par Gout: Now on allopurinol. No gout\par BPH and renal lesions: f/u with Dr. Bullard. MRI no suspicious lesions\par Continue education with [facility]. \par Labs in 1 month. \par Follow up 3 months with Dr. Holloway. \par

## 2023-06-12 NOTE — PHYSICAL EXAM
[General Appearance - Alert] : alert [General Appearance - In No Acute Distress] : in no acute distress [Sclera] : the sclera and conjunctiva were normal [Outer Ear] : the ears and nose were normal in appearance [Neck Appearance] : the appearance of the neck was normal [Auscultation Breath Sounds / Voice Sounds] : lungs were clear to auscultation bilaterally [Heart Rate And Rhythm] : heart rate was normal and rhythm regular [Heart Sounds] : normal S1 and S2 [Murmurs] : no murmurs [Bowel Sounds] : normal bowel sounds [Abdomen Soft] : soft [No CVA Tenderness] : no ~M costovertebral angle tenderness [Abnormal Walk] : normal gait [] : no rash [No Focal Deficits] : no focal deficits [Oriented To Time, Place, And Person] : oriented to person, place, and time [Affect] : the affect was normal [Mood] : the mood was normal [General Appearance - Well Nourished] : well nourished [Edema] : there was no peripheral edema [Involuntary Movements] : no involuntary movements were seen

## 2023-06-12 NOTE — HISTORY OF PRESENT ILLNESS
[FreeTextEntry1] : JCARLOS PARIKH is an 83year male with a history of long-standing hypertension, diabetes, chronic kidney disease stage V for follow-up.  Renal bx April 2021 with 50% global sclerosis, HTN adaptive changes\par \par BPs at home better, today 137/70.\par Less BLLE edema with increased Torsemide.\par Pt had many family members visit throughout month of May. He discussed his wishes with his family and will remain with the plan of conservative medical management of his CKD. \par He reports feeling improvement in energy when he received the Procrit injection. \par Appetite remains good. No n/v. No metallic taste. Urinating well.

## 2023-06-14 ENCOUNTER — RX RENEWAL (OUTPATIENT)
Age: 83
End: 2023-06-14

## 2023-06-14 RX ORDER — BLOOD SUGAR DIAGNOSTIC
STRIP MISCELLANEOUS DAILY
Qty: 100 | Refills: 2 | Status: ACTIVE | COMMUNITY
Start: 2021-10-13 | End: 1900-01-01

## 2023-06-21 ENCOUNTER — NON-APPOINTMENT (OUTPATIENT)
Age: 83
End: 2023-06-21

## 2023-06-21 ENCOUNTER — APPOINTMENT (OUTPATIENT)
Dept: CARDIOLOGY | Facility: CLINIC | Age: 83
End: 2023-06-21
Payer: MEDICARE

## 2023-06-21 VITALS
SYSTOLIC BLOOD PRESSURE: 138 MMHG | WEIGHT: 155 LBS | RESPIRATION RATE: 17 BRPM | BODY MASS INDEX: 22.96 KG/M2 | OXYGEN SATURATION: 100 % | HEIGHT: 69 IN | DIASTOLIC BLOOD PRESSURE: 70 MMHG | HEART RATE: 56 BPM

## 2023-06-21 PROCEDURE — 99214 OFFICE O/P EST MOD 30 MIN: CPT

## 2023-06-21 PROCEDURE — 93000 ELECTROCARDIOGRAM COMPLETE: CPT

## 2023-06-21 NOTE — PHYSICAL EXAM

## 2023-06-21 NOTE — HISTORY OF PRESENT ILLNESS
[FreeTextEntry1] : He has a hx. of HTN and HLD as well as DM.  Cardiac history has also included palpitations.\par \par Echocardiogram in September 2021 shoed mild MR and AI.  The LA was moderately dilated.  LV systolic function was normal (EF 70%); mild concentric LVH was seen with evidence of mild diastolic LV dysfunction.  Doppler assessment also reported mild tricuspid and pulmonic valve insufficiency.\par \par Stress testing with isotope imaging in Aug. 2014 revealed that he walked for 9-1/2 minutes on a Colby protocol to a heart rate of 122 bpm.  Perfusion images were unremarkable; left ventricular ejection fraction was 62%.\par \par He also has a history of chronic renal insufficiency and sees Dr. Pam Holloway. \par \par As he returns today, he continues his usual activities without problem.  From a cardiac standpoint, he describes no episodes of exertional CP and no LAWSON.  There have been no palpitations.  He reports no episodes of orthopnea or PND.

## 2023-07-19 ENCOUNTER — NON-APPOINTMENT (OUTPATIENT)
Age: 83
End: 2023-07-19

## 2023-07-19 LAB
ALBUMIN SERPL ELPH-MCNC: 4 G/DL
ANION GAP SERPL CALC-SCNC: 14 MMOL/L
BUN SERPL-MCNC: 57 MG/DL
CALCIUM SERPL-MCNC: 9 MG/DL
CHLORIDE SERPL-SCNC: 107 MMOL/L
CO2 SERPL-SCNC: 19 MMOL/L
CREAT SERPL-MCNC: 6.14 MG/DL
EGFR: 9 ML/MIN/1.73M2
GLUCOSE SERPL-MCNC: 94 MG/DL
PHOSPHATE SERPL-MCNC: 4.7 MG/DL
POTASSIUM SERPL-SCNC: 4.4 MMOL/L
SODIUM SERPL-SCNC: 139 MMOL/L

## 2023-07-20 ENCOUNTER — APPOINTMENT (OUTPATIENT)
Dept: NEPHROLOGY | Facility: CLINIC | Age: 83
End: 2023-07-20
Payer: MEDICARE

## 2023-07-20 VITALS
TEMPERATURE: 97.9 F | WEIGHT: 149.91 LBS | DIASTOLIC BLOOD PRESSURE: 72 MMHG | HEART RATE: 64 BPM | SYSTOLIC BLOOD PRESSURE: 142 MMHG | OXYGEN SATURATION: 99 % | HEIGHT: 69 IN | BODY MASS INDEX: 22.2 KG/M2

## 2023-07-20 PROCEDURE — 96372 THER/PROPH/DIAG INJ SC/IM: CPT

## 2023-07-20 RX ORDER — DARBEPOETIN ALFA 40 UG/ML
40 SOLUTION INTRAVENOUS; SUBCUTANEOUS
Refills: 0 | Status: COMPLETED | OUTPATIENT
Start: 2023-07-20

## 2023-07-20 RX ADMIN — DARBEPOETIN ALFA 1 MCG/ML: 40 SOLUTION INTRAVENOUS; SUBCUTANEOUS at 00:00

## 2023-07-20 NOTE — PROCEDURE
[FreeTextEntry1] : Hgb 9.3/ Hct 29.5\par Aranesp 40mcg SQ x 1 today. \par Pt tolerated procedure well\par Labs in one month \par \par Labs reviewed with patient. Creatinine uptrend noted now 6.14. Pt reports home BPs have been low at times 109/70. Advised he hold Benazapril-Amlodipine 20-5mg BID for now and continue to monitor BPS. If BPs start to increase will add back Amlodipine 5mg PO BID. Repeat labs in one month. Orders placed.

## 2023-07-24 ENCOUNTER — RX RENEWAL (OUTPATIENT)
Age: 83
End: 2023-07-24

## 2023-08-03 ENCOUNTER — NON-APPOINTMENT (OUTPATIENT)
Age: 83
End: 2023-08-03

## 2023-08-07 ENCOUNTER — RX RENEWAL (OUTPATIENT)
Age: 83
End: 2023-08-07

## 2023-08-15 ENCOUNTER — LABORATORY RESULT (OUTPATIENT)
Age: 83
End: 2023-08-15

## 2023-08-16 LAB
ALBUMIN SERPL ELPH-MCNC: 4.1 G/DL
ANION GAP SERPL CALC-SCNC: 14 MMOL/L
APPEARANCE: CLEAR
BILIRUBIN URINE: NEGATIVE
BLOOD URINE: NEGATIVE
BUN SERPL-MCNC: 71 MG/DL
CALCIUM SERPL-MCNC: 9.3 MG/DL
CHLORIDE SERPL-SCNC: 106 MMOL/L
CO2 SERPL-SCNC: 20 MMOL/L
COLOR: YELLOW
CREAT SERPL-MCNC: 6.16 MG/DL
CREAT SPEC-SCNC: 110 MG/DL
EGFR: 9 ML/MIN/1.73M2
GLUCOSE QUALITATIVE U: NEGATIVE MG/DL
GLUCOSE SERPL-MCNC: 99 MG/DL
KETONES URINE: NEGATIVE MG/DL
LEUKOCYTE ESTERASE URINE: NEGATIVE
MICROALBUMIN 24H UR DL<=1MG/L-MCNC: 211.3 MG/DL
MICROALBUMIN/CREAT 24H UR-RTO: 1917 MG/G
NITRITE URINE: NEGATIVE
PH URINE: 5.5
PHOSPHATE SERPL-MCNC: 4.2 MG/DL
POTASSIUM SERPL-SCNC: 4 MMOL/L
PROTEIN URINE: 300 MG/DL
SODIUM SERPL-SCNC: 140 MMOL/L
SPECIFIC GRAVITY URINE: 1.02
UROBILINOGEN URINE: 0.2 MG/DL

## 2023-09-12 ENCOUNTER — APPOINTMENT (OUTPATIENT)
Dept: INTERNAL MEDICINE | Facility: CLINIC | Age: 83
End: 2023-09-12
Payer: MEDICARE

## 2023-09-12 VITALS
DIASTOLIC BLOOD PRESSURE: 78 MMHG | TEMPERATURE: 97.3 F | OXYGEN SATURATION: 99 % | SYSTOLIC BLOOD PRESSURE: 170 MMHG | HEIGHT: 69 IN | HEART RATE: 61 BPM | BODY MASS INDEX: 21.62 KG/M2 | WEIGHT: 146 LBS

## 2023-09-12 DIAGNOSIS — B18.1 CHRONIC VIRAL HEPATITIS B W/OUT DELTA-AGENT: ICD-10-CM

## 2023-09-12 DIAGNOSIS — D69.6 THROMBOCYTOPENIA, UNSPECIFIED: ICD-10-CM

## 2023-09-12 PROCEDURE — 90662 IIV NO PRSV INCREASED AG IM: CPT

## 2023-09-12 PROCEDURE — G0008: CPT

## 2023-09-12 PROCEDURE — 99214 OFFICE O/P EST MOD 30 MIN: CPT | Mod: 25

## 2023-09-12 RX ORDER — AMLODIPINE BESYLATE 5 MG/1
5 TABLET ORAL
Qty: 90 | Refills: 3 | Status: DISCONTINUED | COMMUNITY
Start: 2023-08-24 | End: 2023-09-12

## 2023-09-12 RX ORDER — TORSEMIDE 20 MG/1
20 TABLET ORAL
Qty: 90 | Refills: 3 | Status: DISCONTINUED | COMMUNITY
Start: 2021-09-13 | End: 2023-09-12

## 2023-09-12 RX ORDER — SITAGLIPTIN 25 MG/1
25 TABLET, FILM COATED ORAL
Refills: 0 | Status: DISCONTINUED | COMMUNITY
End: 2023-09-12

## 2023-09-18 ENCOUNTER — APPOINTMENT (OUTPATIENT)
Dept: NEPHROLOGY | Facility: CLINIC | Age: 83
End: 2023-09-18
Payer: MEDICARE

## 2023-09-18 ENCOUNTER — NON-APPOINTMENT (OUTPATIENT)
Age: 83
End: 2023-09-18

## 2023-09-18 VITALS
DIASTOLIC BLOOD PRESSURE: 64 MMHG | HEIGHT: 69 IN | HEART RATE: 60 BPM | OXYGEN SATURATION: 100 % | SYSTOLIC BLOOD PRESSURE: 174 MMHG | TEMPERATURE: 97.6 F | BODY MASS INDEX: 21.88 KG/M2 | WEIGHT: 147.71 LBS

## 2023-09-18 VITALS — DIASTOLIC BLOOD PRESSURE: 68 MMHG | HEART RATE: 60 BPM | SYSTOLIC BLOOD PRESSURE: 154 MMHG

## 2023-09-18 DIAGNOSIS — L29.9 PRURITUS, UNSPECIFIED: ICD-10-CM

## 2023-09-18 LAB
25(OH)D3 SERPL-MCNC: 35.3 NG/ML
ALBUMIN SERPL ELPH-MCNC: 4.2 G/DL
ALP BLD-CCNC: 92 U/L
ALT SERPL-CCNC: 8 U/L
ANION GAP SERPL CALC-SCNC: 14 MMOL/L
APPEARANCE: CLEAR
AST SERPL-CCNC: 10 U/L
BACTERIA: NEGATIVE /HPF
BASOPHILS # BLD AUTO: 0.04 K/UL
BASOPHILS NFR BLD AUTO: 0.7 %
BILIRUB SERPL-MCNC: 0.2 MG/DL
BILIRUBIN URINE: NEGATIVE
BLOOD URINE: NEGATIVE
BUN SERPL-MCNC: 50 MG/DL
CALCIUM SERPL-MCNC: 9.2 MG/DL
CAST: NORMAL /LPF
CHLORIDE SERPL-SCNC: 105 MMOL/L
CHOLEST SERPL-MCNC: 183 MG/DL
CO2 SERPL-SCNC: 21 MMOL/L
COLOR: YELLOW
CREAT SERPL-MCNC: 5.7 MG/DL
CREAT SPEC-SCNC: 124 MG/DL
EGFR: 9 ML/MIN/1.73M2
EOSINOPHIL # BLD AUTO: 0.17 K/UL
EOSINOPHIL NFR BLD AUTO: 3.1 %
EPITHELIAL CELLS: 1 /HPF
ESTIMATED AVERAGE GLUCOSE: 114 MG/DL
FOLATE SERPL-MCNC: 13.5 NG/ML
GLUCOSE QUALITATIVE U: NEGATIVE MG/DL
GLUCOSE SERPL-MCNC: 113 MG/DL
HBA1C MFR BLD HPLC: 5.6 %
HCT VFR BLD CALC: 31.7 %
HDLC SERPL-MCNC: 47 MG/DL
HGB BLD-MCNC: 9.8 G/DL
IMM GRANULOCYTES NFR BLD AUTO: 0.2 %
IRON SERPL-MCNC: 63 UG/DL
KETONES URINE: NEGATIVE MG/DL
LDLC SERPL CALC-MCNC: 102 MG/DL
LEUKOCYTE ESTERASE URINE: NEGATIVE
LYMPHOCYTES # BLD AUTO: 1.11 K/UL
LYMPHOCYTES NFR BLD AUTO: 20.4 %
MAN DIFF?: NORMAL
MCHC RBC-ENTMCNC: 29.8 PG
MCHC RBC-ENTMCNC: 30.9 GM/DL
MCV RBC AUTO: 96.4 FL
MICROALBUMIN 24H UR DL<=1MG/L-MCNC: 362 MG/DL
MICROALBUMIN/CREAT 24H UR-RTO: 2908 MG/G
MICROSCOPIC-UA: NORMAL
MONOCYTES # BLD AUTO: 0.3 K/UL
MONOCYTES NFR BLD AUTO: 5.5 %
NEUTROPHILS # BLD AUTO: 3.82 K/UL
NEUTROPHILS NFR BLD AUTO: 70.1 %
NITRITE URINE: NEGATIVE
NONHDLC SERPL-MCNC: 136 MG/DL
PH URINE: 6
PLATELET # BLD AUTO: 138 K/UL
POTASSIUM SERPL-SCNC: 4.4 MMOL/L
PROT SERPL-MCNC: 6.9 G/DL
PROTEIN URINE: 300 MG/DL
RBC # BLD: 3.29 M/UL
RBC # FLD: 16.2 %
RED BLOOD CELLS URINE: 1 /HPF
REVIEW: NORMAL
SODIUM SERPL-SCNC: 139 MMOL/L
SPECIFIC GRAVITY URINE: 1.02
TRIGL SERPL-MCNC: 194 MG/DL
TSH SERPL-ACNC: 2.28 UIU/ML
URATE SERPL-MCNC: 4.7 MG/DL
UROBILINOGEN URINE: 0.2 MG/DL
VIT B12 SERPL-MCNC: 1135 PG/ML
WBC # FLD AUTO: 5.45 K/UL
WHITE BLOOD CELLS URINE: 1 /HPF

## 2023-09-18 PROCEDURE — 96372 THER/PROPH/DIAG INJ SC/IM: CPT

## 2023-09-18 PROCEDURE — 99214 OFFICE O/P EST MOD 30 MIN: CPT | Mod: 25

## 2023-09-18 RX ORDER — DARBEPOETIN ALFA 40 UG/ML
40 SOLUTION INTRAVENOUS; SUBCUTANEOUS
Refills: 0 | Status: COMPLETED | OUTPATIENT
Start: 2023-09-18

## 2023-09-18 RX ADMIN — DARBEPOETIN ALFA 0 MCG/ML: 40 SOLUTION INTRAVENOUS; SUBCUTANEOUS at 00:00

## 2023-10-05 ENCOUNTER — RX RENEWAL (OUTPATIENT)
Age: 83
End: 2023-10-05

## 2023-10-05 RX ORDER — SITAGLIPTIN 25 MG/1
25 TABLET, FILM COATED ORAL DAILY
Qty: 90 | Refills: 1 | Status: ACTIVE | COMMUNITY
Start: 2021-10-26 | End: 1900-01-01

## 2023-10-13 ENCOUNTER — NON-APPOINTMENT (OUTPATIENT)
Age: 83
End: 2023-10-13

## 2023-10-25 ENCOUNTER — NON-APPOINTMENT (OUTPATIENT)
Age: 83
End: 2023-10-25

## 2023-10-25 RX ORDER — AMLODIPINE BESYLATE 5 MG/1
5 TABLET ORAL
Qty: 180 | Refills: 3 | Status: ACTIVE | COMMUNITY
Start: 1900-01-01 | End: 1900-01-01

## 2023-11-03 ENCOUNTER — NON-APPOINTMENT (OUTPATIENT)
Age: 83
End: 2023-11-03

## 2023-11-05 ENCOUNTER — TRANSCRIPTION ENCOUNTER (OUTPATIENT)
Age: 83
End: 2023-11-05

## 2023-11-06 ENCOUNTER — INPATIENT (INPATIENT)
Facility: HOSPITAL | Age: 83
LOS: 3 days | Discharge: HOME HEALTH | DRG: 193 | End: 2023-11-10
Attending: HOSPITALIST | Admitting: HOSPITALIST
Payer: MEDICARE

## 2023-11-06 VITALS
HEART RATE: 78 BPM | OXYGEN SATURATION: 98 % | DIASTOLIC BLOOD PRESSURE: 64 MMHG | TEMPERATURE: 99 F | WEIGHT: 141.98 LBS | RESPIRATION RATE: 22 BRPM | SYSTOLIC BLOOD PRESSURE: 155 MMHG | HEIGHT: 69 IN

## 2023-11-06 DIAGNOSIS — Z29.9 ENCOUNTER FOR PROPHYLACTIC MEASURES, UNSPECIFIED: ICD-10-CM

## 2023-11-06 DIAGNOSIS — E11.9 TYPE 2 DIABETES MELLITUS WITHOUT COMPLICATIONS: ICD-10-CM

## 2023-11-06 DIAGNOSIS — Z98.89 OTHER SPECIFIED POSTPROCEDURAL STATES: Chronic | ICD-10-CM

## 2023-11-06 DIAGNOSIS — N40.0 BENIGN PROSTATIC HYPERPLASIA WITHOUT LOWER URINARY TRACT SYMPTOMS: ICD-10-CM

## 2023-11-06 DIAGNOSIS — J18.1 LOBAR PNEUMONIA, UNSPECIFIED ORGANISM: ICD-10-CM

## 2023-11-06 DIAGNOSIS — N17.9 ACUTE KIDNEY FAILURE, UNSPECIFIED: ICD-10-CM

## 2023-11-06 DIAGNOSIS — E78.5 HYPERLIPIDEMIA, UNSPECIFIED: ICD-10-CM

## 2023-11-06 DIAGNOSIS — J18.9 PNEUMONIA, UNSPECIFIED ORGANISM: ICD-10-CM

## 2023-11-06 DIAGNOSIS — I10 ESSENTIAL (PRIMARY) HYPERTENSION: ICD-10-CM

## 2023-11-06 LAB
ALBUMIN SERPL ELPH-MCNC: 3.5 G/DL — SIGNIFICANT CHANGE UP (ref 3.3–5)
ALBUMIN SERPL ELPH-MCNC: 3.5 G/DL — SIGNIFICANT CHANGE UP (ref 3.3–5)
ALP SERPL-CCNC: 89 U/L — SIGNIFICANT CHANGE UP (ref 40–120)
ALP SERPL-CCNC: 89 U/L — SIGNIFICANT CHANGE UP (ref 40–120)
ALT FLD-CCNC: 10 U/L — SIGNIFICANT CHANGE UP (ref 10–45)
ALT FLD-CCNC: 10 U/L — SIGNIFICANT CHANGE UP (ref 10–45)
ANION GAP SERPL CALC-SCNC: 17 MMOL/L — SIGNIFICANT CHANGE UP (ref 5–17)
ANION GAP SERPL CALC-SCNC: 17 MMOL/L — SIGNIFICANT CHANGE UP (ref 5–17)
APTT BLD: 28.5 SEC — SIGNIFICANT CHANGE UP (ref 24.5–35.6)
APTT BLD: 28.5 SEC — SIGNIFICANT CHANGE UP (ref 24.5–35.6)
AST SERPL-CCNC: 11 U/L — SIGNIFICANT CHANGE UP (ref 10–40)
AST SERPL-CCNC: 11 U/L — SIGNIFICANT CHANGE UP (ref 10–40)
BASE EXCESS BLDV CALC-SCNC: -12.3 MMOL/L — LOW (ref -2–3)
BASE EXCESS BLDV CALC-SCNC: -12.3 MMOL/L — LOW (ref -2–3)
BASOPHILS # BLD AUTO: 0.02 K/UL — SIGNIFICANT CHANGE UP (ref 0–0.2)
BASOPHILS # BLD AUTO: 0.02 K/UL — SIGNIFICANT CHANGE UP (ref 0–0.2)
BASOPHILS NFR BLD AUTO: 0.2 % — SIGNIFICANT CHANGE UP (ref 0–2)
BASOPHILS NFR BLD AUTO: 0.2 % — SIGNIFICANT CHANGE UP (ref 0–2)
BILIRUB SERPL-MCNC: 0.2 MG/DL — SIGNIFICANT CHANGE UP (ref 0.2–1.2)
BILIRUB SERPL-MCNC: 0.2 MG/DL — SIGNIFICANT CHANGE UP (ref 0.2–1.2)
BUN SERPL-MCNC: 92 MG/DL — HIGH (ref 7–23)
BUN SERPL-MCNC: 92 MG/DL — HIGH (ref 7–23)
CA-I SERPL-SCNC: 1.26 MMOL/L — SIGNIFICANT CHANGE UP (ref 1.15–1.33)
CA-I SERPL-SCNC: 1.26 MMOL/L — SIGNIFICANT CHANGE UP (ref 1.15–1.33)
CALCIUM SERPL-MCNC: 9 MG/DL — SIGNIFICANT CHANGE UP (ref 8.4–10.5)
CALCIUM SERPL-MCNC: 9 MG/DL — SIGNIFICANT CHANGE UP (ref 8.4–10.5)
CHLORIDE BLDV-SCNC: 104 MMOL/L — SIGNIFICANT CHANGE UP (ref 96–108)
CHLORIDE BLDV-SCNC: 104 MMOL/L — SIGNIFICANT CHANGE UP (ref 96–108)
CHLORIDE SERPL-SCNC: 102 MMOL/L — SIGNIFICANT CHANGE UP (ref 96–108)
CHLORIDE SERPL-SCNC: 102 MMOL/L — SIGNIFICANT CHANGE UP (ref 96–108)
CO2 BLDV-SCNC: 15 MMOL/L — LOW (ref 22–26)
CO2 BLDV-SCNC: 15 MMOL/L — LOW (ref 22–26)
CO2 SERPL-SCNC: 15 MMOL/L — LOW (ref 22–31)
CO2 SERPL-SCNC: 15 MMOL/L — LOW (ref 22–31)
CREAT SERPL-MCNC: 9.24 MG/DL — HIGH (ref 0.5–1.3)
CREAT SERPL-MCNC: 9.24 MG/DL — HIGH (ref 0.5–1.3)
EGFR: 5 ML/MIN/1.73M2 — LOW
EGFR: 5 ML/MIN/1.73M2 — LOW
EOSINOPHIL # BLD AUTO: 0.01 K/UL — SIGNIFICANT CHANGE UP (ref 0–0.5)
EOSINOPHIL # BLD AUTO: 0.01 K/UL — SIGNIFICANT CHANGE UP (ref 0–0.5)
EOSINOPHIL NFR BLD AUTO: 0.1 % — SIGNIFICANT CHANGE UP (ref 0–6)
EOSINOPHIL NFR BLD AUTO: 0.1 % — SIGNIFICANT CHANGE UP (ref 0–6)
FLUAV AG NPH QL: SIGNIFICANT CHANGE UP
FLUAV AG NPH QL: SIGNIFICANT CHANGE UP
FLUBV AG NPH QL: SIGNIFICANT CHANGE UP
FLUBV AG NPH QL: SIGNIFICANT CHANGE UP
GAS PNL BLDV: 133 MMOL/L — LOW (ref 136–145)
GAS PNL BLDV: 133 MMOL/L — LOW (ref 136–145)
GAS PNL BLDV: SIGNIFICANT CHANGE UP
GAS PNL BLDV: SIGNIFICANT CHANGE UP
GLUCOSE BLDV-MCNC: 140 MG/DL — HIGH (ref 70–99)
GLUCOSE BLDV-MCNC: 140 MG/DL — HIGH (ref 70–99)
GLUCOSE SERPL-MCNC: 134 MG/DL — HIGH (ref 70–99)
GLUCOSE SERPL-MCNC: 134 MG/DL — HIGH (ref 70–99)
HCO3 BLDV-SCNC: 14 MMOL/L — LOW (ref 22–29)
HCO3 BLDV-SCNC: 14 MMOL/L — LOW (ref 22–29)
HCT VFR BLD CALC: 24.7 % — LOW (ref 39–50)
HCT VFR BLD CALC: 24.7 % — LOW (ref 39–50)
HCT VFR BLDA CALC: 26 % — LOW (ref 39–51)
HCT VFR BLDA CALC: 26 % — LOW (ref 39–51)
HGB BLD CALC-MCNC: 8.6 G/DL — LOW (ref 12.6–17.4)
HGB BLD CALC-MCNC: 8.6 G/DL — LOW (ref 12.6–17.4)
HGB BLD-MCNC: 8 G/DL — LOW (ref 13–17)
HGB BLD-MCNC: 8 G/DL — LOW (ref 13–17)
IMM GRANULOCYTES NFR BLD AUTO: 1 % — HIGH (ref 0–0.9)
IMM GRANULOCYTES NFR BLD AUTO: 1 % — HIGH (ref 0–0.9)
INR BLD: 1.22 RATIO — HIGH (ref 0.85–1.18)
INR BLD: 1.22 RATIO — HIGH (ref 0.85–1.18)
LACTATE BLDV-MCNC: 1.1 MMOL/L — SIGNIFICANT CHANGE UP (ref 0.5–2)
LACTATE BLDV-MCNC: 1.1 MMOL/L — SIGNIFICANT CHANGE UP (ref 0.5–2)
LYMPHOCYTES # BLD AUTO: 0.65 K/UL — LOW (ref 1–3.3)
LYMPHOCYTES # BLD AUTO: 0.65 K/UL — LOW (ref 1–3.3)
LYMPHOCYTES # BLD AUTO: 6.1 % — LOW (ref 13–44)
LYMPHOCYTES # BLD AUTO: 6.1 % — LOW (ref 13–44)
MAGNESIUM SERPL-MCNC: 2.7 MG/DL — HIGH (ref 1.6–2.6)
MAGNESIUM SERPL-MCNC: 2.7 MG/DL — HIGH (ref 1.6–2.6)
MCHC RBC-ENTMCNC: 29.6 PG — SIGNIFICANT CHANGE UP (ref 27–34)
MCHC RBC-ENTMCNC: 29.6 PG — SIGNIFICANT CHANGE UP (ref 27–34)
MCHC RBC-ENTMCNC: 32.4 GM/DL — SIGNIFICANT CHANGE UP (ref 32–36)
MCHC RBC-ENTMCNC: 32.4 GM/DL — SIGNIFICANT CHANGE UP (ref 32–36)
MCV RBC AUTO: 91.5 FL — SIGNIFICANT CHANGE UP (ref 80–100)
MCV RBC AUTO: 91.5 FL — SIGNIFICANT CHANGE UP (ref 80–100)
MONOCYTES # BLD AUTO: 0.95 K/UL — HIGH (ref 0–0.9)
MONOCYTES # BLD AUTO: 0.95 K/UL — HIGH (ref 0–0.9)
MONOCYTES NFR BLD AUTO: 9 % — SIGNIFICANT CHANGE UP (ref 2–14)
MONOCYTES NFR BLD AUTO: 9 % — SIGNIFICANT CHANGE UP (ref 2–14)
NEUTROPHILS # BLD AUTO: 8.83 K/UL — HIGH (ref 1.8–7.4)
NEUTROPHILS # BLD AUTO: 8.83 K/UL — HIGH (ref 1.8–7.4)
NEUTROPHILS NFR BLD AUTO: 83.6 % — HIGH (ref 43–77)
NEUTROPHILS NFR BLD AUTO: 83.6 % — HIGH (ref 43–77)
NRBC # BLD: 0 /100 WBCS — SIGNIFICANT CHANGE UP (ref 0–0)
NRBC # BLD: 0 /100 WBCS — SIGNIFICANT CHANGE UP (ref 0–0)
NT-PROBNP SERPL-SCNC: 5218 PG/ML — HIGH (ref 0–300)
NT-PROBNP SERPL-SCNC: 5218 PG/ML — HIGH (ref 0–300)
PCO2 BLDV: 34 MMHG — LOW (ref 42–55)
PCO2 BLDV: 34 MMHG — LOW (ref 42–55)
PH BLDV: 7.23 — LOW (ref 7.32–7.43)
PH BLDV: 7.23 — LOW (ref 7.32–7.43)
PLATELET # BLD AUTO: 188 K/UL — SIGNIFICANT CHANGE UP (ref 150–400)
PLATELET # BLD AUTO: 188 K/UL — SIGNIFICANT CHANGE UP (ref 150–400)
PO2 BLDV: 38 MMHG — SIGNIFICANT CHANGE UP (ref 25–45)
PO2 BLDV: 38 MMHG — SIGNIFICANT CHANGE UP (ref 25–45)
POTASSIUM BLDV-SCNC: 5 MMOL/L — SIGNIFICANT CHANGE UP (ref 3.5–5.1)
POTASSIUM BLDV-SCNC: 5 MMOL/L — SIGNIFICANT CHANGE UP (ref 3.5–5.1)
POTASSIUM SERPL-MCNC: 4.8 MMOL/L — SIGNIFICANT CHANGE UP (ref 3.5–5.3)
POTASSIUM SERPL-MCNC: 4.8 MMOL/L — SIGNIFICANT CHANGE UP (ref 3.5–5.3)
POTASSIUM SERPL-SCNC: 4.8 MMOL/L — SIGNIFICANT CHANGE UP (ref 3.5–5.3)
POTASSIUM SERPL-SCNC: 4.8 MMOL/L — SIGNIFICANT CHANGE UP (ref 3.5–5.3)
PROT SERPL-MCNC: 6.9 G/DL — SIGNIFICANT CHANGE UP (ref 6–8.3)
PROT SERPL-MCNC: 6.9 G/DL — SIGNIFICANT CHANGE UP (ref 6–8.3)
PROTHROM AB SERPL-ACNC: 12.7 SEC — SIGNIFICANT CHANGE UP (ref 9.5–13)
PROTHROM AB SERPL-ACNC: 12.7 SEC — SIGNIFICANT CHANGE UP (ref 9.5–13)
RBC # BLD: 2.7 M/UL — LOW (ref 4.2–5.8)
RBC # BLD: 2.7 M/UL — LOW (ref 4.2–5.8)
RBC # FLD: 15.9 % — HIGH (ref 10.3–14.5)
RBC # FLD: 15.9 % — HIGH (ref 10.3–14.5)
RSV RNA NPH QL NAA+NON-PROBE: SIGNIFICANT CHANGE UP
RSV RNA NPH QL NAA+NON-PROBE: SIGNIFICANT CHANGE UP
SAO2 % BLDV: 58.3 % — LOW (ref 67–88)
SAO2 % BLDV: 58.3 % — LOW (ref 67–88)
SARS-COV-2 RNA SPEC QL NAA+PROBE: SIGNIFICANT CHANGE UP
SARS-COV-2 RNA SPEC QL NAA+PROBE: SIGNIFICANT CHANGE UP
SODIUM SERPL-SCNC: 134 MMOL/L — LOW (ref 135–145)
SODIUM SERPL-SCNC: 134 MMOL/L — LOW (ref 135–145)
TROPONIN T, HIGH SENSITIVITY RESULT: 64 NG/L — HIGH (ref 0–51)
TROPONIN T, HIGH SENSITIVITY RESULT: 64 NG/L — HIGH (ref 0–51)
TROPONIN T, HIGH SENSITIVITY RESULT: 68 NG/L — HIGH (ref 0–51)
TROPONIN T, HIGH SENSITIVITY RESULT: 68 NG/L — HIGH (ref 0–51)
WBC # BLD: 10.57 K/UL — HIGH (ref 3.8–10.5)
WBC # BLD: 10.57 K/UL — HIGH (ref 3.8–10.5)
WBC # FLD AUTO: 10.57 K/UL — HIGH (ref 3.8–10.5)
WBC # FLD AUTO: 10.57 K/UL — HIGH (ref 3.8–10.5)

## 2023-11-06 PROCEDURE — 99285 EMERGENCY DEPT VISIT HI MDM: CPT | Mod: FS

## 2023-11-06 PROCEDURE — 71250 CT THORAX DX C-: CPT | Mod: 26,MA

## 2023-11-06 PROCEDURE — 99223 1ST HOSP IP/OBS HIGH 75: CPT

## 2023-11-06 PROCEDURE — 71046 X-RAY EXAM CHEST 2 VIEWS: CPT | Mod: 26

## 2023-11-06 RX ORDER — DOXAZOSIN MESYLATE 4 MG
8 TABLET ORAL DAILY
Refills: 0 | Status: DISCONTINUED | OUTPATIENT
Start: 2023-11-06 | End: 2023-11-10

## 2023-11-06 RX ORDER — ACETAMINOPHEN 500 MG
650 TABLET ORAL EVERY 6 HOURS
Refills: 0 | Status: DISCONTINUED | OUTPATIENT
Start: 2023-11-06 | End: 2023-11-10

## 2023-11-06 RX ORDER — DEXTROSE 50 % IN WATER 50 %
25 SYRINGE (ML) INTRAVENOUS ONCE
Refills: 0 | Status: DISCONTINUED | OUTPATIENT
Start: 2023-11-06 | End: 2023-11-07

## 2023-11-06 RX ORDER — SODIUM CHLORIDE 9 MG/ML
250 INJECTION, SOLUTION INTRAVENOUS
Refills: 0 | Status: DISCONTINUED | OUTPATIENT
Start: 2023-11-06 | End: 2023-11-10

## 2023-11-06 RX ORDER — AZITHROMYCIN 500 MG/1
500 TABLET, FILM COATED ORAL ONCE
Refills: 0 | Status: COMPLETED | OUTPATIENT
Start: 2023-11-06 | End: 2023-11-06

## 2023-11-06 RX ORDER — FINASTERIDE 5 MG/1
5 TABLET, FILM COATED ORAL DAILY
Refills: 0 | Status: DISCONTINUED | OUTPATIENT
Start: 2023-11-06 | End: 2023-11-10

## 2023-11-06 RX ORDER — ONDANSETRON 8 MG/1
4 TABLET, FILM COATED ORAL EVERY 8 HOURS
Refills: 0 | Status: DISCONTINUED | OUTPATIENT
Start: 2023-11-06 | End: 2023-11-10

## 2023-11-06 RX ORDER — SODIUM CHLORIDE 9 MG/ML
1000 INJECTION, SOLUTION INTRAVENOUS
Refills: 0 | Status: DISCONTINUED | OUTPATIENT
Start: 2023-11-06 | End: 2023-11-07

## 2023-11-06 RX ORDER — DEXTROSE 50 % IN WATER 50 %
12.5 SYRINGE (ML) INTRAVENOUS ONCE
Refills: 0 | Status: DISCONTINUED | OUTPATIENT
Start: 2023-11-06 | End: 2023-11-07

## 2023-11-06 RX ORDER — AZITHROMYCIN 500 MG/1
500 TABLET, FILM COATED ORAL EVERY 24 HOURS
Refills: 0 | Status: DISCONTINUED | OUTPATIENT
Start: 2023-11-06 | End: 2023-11-07

## 2023-11-06 RX ORDER — IPRATROPIUM/ALBUTEROL SULFATE 18-103MCG
3 AEROSOL WITH ADAPTER (GRAM) INHALATION EVERY 6 HOURS
Refills: 0 | Status: DISCONTINUED | OUTPATIENT
Start: 2023-11-06 | End: 2023-11-10

## 2023-11-06 RX ORDER — LANOLIN ALCOHOL/MO/W.PET/CERES
3 CREAM (GRAM) TOPICAL AT BEDTIME
Refills: 0 | Status: DISCONTINUED | OUTPATIENT
Start: 2023-11-06 | End: 2023-11-10

## 2023-11-06 RX ORDER — AMLODIPINE BESYLATE 2.5 MG/1
5 TABLET ORAL
Refills: 0 | Status: DISCONTINUED | OUTPATIENT
Start: 2023-11-06 | End: 2023-11-10

## 2023-11-06 RX ORDER — DORZOLAMIDE HYDROCHLORIDE TIMOLOL MALEATE 20; 5 MG/ML; MG/ML
1 SOLUTION/ DROPS OPHTHALMIC
Refills: 0 | Status: DISCONTINUED | OUTPATIENT
Start: 2023-11-06 | End: 2023-11-10

## 2023-11-06 RX ORDER — ATENOLOL 25 MG/1
50 TABLET ORAL DAILY
Refills: 0 | Status: DISCONTINUED | OUTPATIENT
Start: 2023-11-06 | End: 2023-11-10

## 2023-11-06 RX ORDER — IPRATROPIUM/ALBUTEROL SULFATE 18-103MCG
3 AEROSOL WITH ADAPTER (GRAM) INHALATION ONCE
Refills: 0 | Status: COMPLETED | OUTPATIENT
Start: 2023-11-06 | End: 2023-11-06

## 2023-11-06 RX ORDER — GLUCAGON INJECTION, SOLUTION 0.5 MG/.1ML
1 INJECTION, SOLUTION SUBCUTANEOUS ONCE
Refills: 0 | Status: DISCONTINUED | OUTPATIENT
Start: 2023-11-06 | End: 2023-11-07

## 2023-11-06 RX ORDER — IPRATROPIUM/ALBUTEROL SULFATE 18-103MCG
3 AEROSOL WITH ADAPTER (GRAM) INHALATION ONCE
Refills: 0 | Status: COMPLETED | OUTPATIENT
Start: 2023-11-06 | End: 2023-11-08

## 2023-11-06 RX ORDER — CEFTRIAXONE 500 MG/1
1000 INJECTION, POWDER, FOR SOLUTION INTRAMUSCULAR; INTRAVENOUS EVERY 24 HOURS
Refills: 0 | Status: DISCONTINUED | OUTPATIENT
Start: 2023-11-06 | End: 2023-11-10

## 2023-11-06 RX ORDER — LATANOPROST 0.05 MG/ML
1 SOLUTION/ DROPS OPHTHALMIC; TOPICAL AT BEDTIME
Refills: 0 | Status: DISCONTINUED | OUTPATIENT
Start: 2023-11-06 | End: 2023-11-10

## 2023-11-06 RX ORDER — DEXTROSE 50 % IN WATER 50 %
15 SYRINGE (ML) INTRAVENOUS ONCE
Refills: 0 | Status: DISCONTINUED | OUTPATIENT
Start: 2023-11-06 | End: 2023-11-07

## 2023-11-06 RX ORDER — IPRATROPIUM/ALBUTEROL SULFATE 18-103MCG
3 AEROSOL WITH ADAPTER (GRAM) INHALATION EVERY 6 HOURS
Refills: 0 | Status: DISCONTINUED | OUTPATIENT
Start: 2023-11-06 | End: 2023-11-06

## 2023-11-06 RX ORDER — LORATADINE 10 MG/1
10 TABLET ORAL DAILY
Refills: 0 | Status: DISCONTINUED | OUTPATIENT
Start: 2023-11-06 | End: 2023-11-10

## 2023-11-06 RX ORDER — CEFTRIAXONE 500 MG/1
1000 INJECTION, POWDER, FOR SOLUTION INTRAMUSCULAR; INTRAVENOUS ONCE
Refills: 0 | Status: COMPLETED | OUTPATIENT
Start: 2023-11-06 | End: 2023-11-06

## 2023-11-06 RX ADMIN — AZITHROMYCIN 500 MILLIGRAM(S): 500 TABLET, FILM COATED ORAL at 18:14

## 2023-11-06 RX ADMIN — Medication 3 MILLILITER(S): at 23:53

## 2023-11-06 RX ADMIN — Medication 3 MILLILITER(S): at 15:00

## 2023-11-06 RX ADMIN — CEFTRIAXONE 100 MILLIGRAM(S): 500 INJECTION, POWDER, FOR SOLUTION INTRAMUSCULAR; INTRAVENOUS at 16:37

## 2023-11-06 NOTE — H&P ADULT - NSHPPHYSICALEXAM_GEN_ALL_CORE
Vital Signs Last 24 Hrs  T(C): 37.2 (06 Nov 2023 23:09), Max: 37.2 (06 Nov 2023 23:09)  T(F): 99 (06 Nov 2023 23:09), Max: 99 (06 Nov 2023 23:09)  HR: 76 (06 Nov 2023 23:09) (71 - 78)  BP: 133/63 (06 Nov 2023 23:09) (133/63 - 155/64)  BP(mean): 87 (06 Nov 2023 23:09) (87 - 100)  RR: 20 (06 Nov 2023 23:09) (19 - 26)  SpO2: 97% (06 Nov 2023 23:09) (95% - 98%)    Parameters below as of 06 Nov 2023 23:09  Patient On (Oxygen Delivery Method): room air        CONSTITUTIONAL: Well-groomed, in no apparent distress  EYES: No conjunctival or scleral injection, non-icteric  ENMT: No external nasal lesions; dry oral mucosa   RESPIRATORY: Breathing comfortably; right upper lung field w/ occasional exp wheeze and diminished BS, no crackles anywhere.   CARDIOVASCULAR: +S1S2, RRR, no M/G/R; pedal pulses full and symmetric; no lower extremity edema  GASTROINTESTINAL: No tenderness, +BS throughout, no rebound/guarding  SKIN: No rashes or ulcers noted  NEUROLOGIC: No gross focal neurological deficits, AAOX3  PSYCHIATRIC: mood and affect appropriate; appropriate insight and judgment Detail Level: Detailed Quality 130: Documentation Of Current Medications In The Medical Record: Current Medications Documented Quality 110: Preventive Care And Screening: Influenza Immunization: Influenza Immunization not Administered because Patient Refused. Quality 226: Preventive Care And Screening: Tobacco Use: Screening And Cessation Intervention: Patient screened for tobacco use and is an ex/non-smoker

## 2023-11-06 NOTE — ED PROVIDER NOTE - NSICDXFAMILYHX_GEN_ALL_CORE_FT
FAMILY HISTORY:  Father  Still living? Unknown  Family history of coronary artery disease, Age at diagnosis: Age Unknown    Mother  Still living? Unknown  Family history of breast cancer, Age at diagnosis: Age Unknown

## 2023-11-06 NOTE — ED PROVIDER NOTE - PATIENT'S PREFERRED PRONOUN
From: Linda Thompson  To: Devan Case  Sent: 8/30/2023 6:26 PM CDT  Subject: allergic reactions to many foods    Alda Lizamanne has a history of reactions to many foods, particularly at dinner. She frequently after eating gets stuffed up, running nose, and has some stomach pain. I have not been able to identify the cause of this as it happens the majority of the dinners. I should have reported this sooner, but her more immediate conditions took precedence. Our daughter and grandson has similar problems and she says a blood test can possibly identify food allergies. This problem has gotten to the point where Linda does not want to eat food for dinner. Do you have a suggestion? Thank you. Vince   Him/He

## 2023-11-06 NOTE — H&P ADULT - NSHPLABSRESULTS_GEN_ALL_CORE
I personally reviewed the CXR: RUL consolidation. no pleural effusion. No ptx  I personally reviewed the EKG: NSR, no acute ischemic changes.

## 2023-11-06 NOTE — ED ADULT TRIAGE NOTE - CHIEF COMPLAINT QUOTE
SOB and wheezing with chest congestion for few days now, h/o CKD, DM, HTN, HLD,  Gout not on dialysis

## 2023-11-06 NOTE — ED ADULT TRIAGE NOTE - GLASGOW COMA SCALE: BEST MOTOR RESPONSE, MLM
Discharge instructions given with prescriptions and signed. Patient propelled by wheel chair to mother's auto.  Discharge completed as ordered (M6) obeys commands

## 2023-11-06 NOTE — ED ADULT NURSE REASSESSMENT NOTE - NS ED NURSE REASSESS COMMENT FT1
Report taken from Joy FLOYD. Patient found in stretcher alert and awake @ this time. He is breathing spontaneously and unlabored on room air. No signs of respiratory distress @ this time. he is alert and orientated x4 and respond in complete sentences. Right 18G placed to the AC and is C/D/I and saline locked. Pt denies chest pain, palpitations, headache, visual disturbances, numbness/tingling, fever, chills, diaphoresis,  nausea, vomiting, constipation, diarrhea, or urinary symptoms.  Patient reports SOB, Nebulizer given to order. Labs sent and awaiting results.

## 2023-11-06 NOTE — H&P ADULT - PROBLEM SELECTOR PLAN 4
-hold januvia  -given pt's very well controlled A1c outpatient, (<6 in Sept), will hold off on insulin sliding scale.   -check FS

## 2023-11-06 NOTE — H&P ADULT - CONVERSATION DETAILS
Discussed patient's current prognosis and treatment options.  Patient declines: cardiac resuscitation, intubation, or dialysis.     In patient's own words:  "I've lived a good life".     Patient had multiple previous discussion with outpatient providers, and has repeatedly affirmed that he did not want dialysis, cardiac resuscitation or intubation.   Family will bring in paper.

## 2023-11-06 NOTE — H&P ADULT - PROBLEM SELECTOR PLAN 1
-c/w CTX and azithromycin  -f/u antigens -c/w CTX and azithromycin  -f/u antigens  -c/w duoneb prn for wheezing

## 2023-11-06 NOTE — ED ADULT NURSE NOTE - OBJECTIVE STATEMENT
[facility]. Cardiac monitor intact. Siderails up. Son at stretcher side. Fall risk precautions maintained. 12 Lead EKG was done in triage. Pt taken to xray via stretcher

## 2023-11-06 NOTE — H&P ADULT - NSHPADDITIONALINFOADULT_GEN_ALL_CORE
Fluid: po intake   Electrolytes: cautiously replete potassium, phosphorus and magnesium to 4/3/2 respectively, given ERIC on CKD5  Nutrition: dash/dm   Activity: as tolerated     CODE STATUS: ??????????  Preferred contact:  ???????????    Med rec: ???????????  Done with patient verbally.  Done with written list of meds provided by the patient. Fluid: will order for 250cc LR. po intake   Electrolytes: cautiously replete potassium, phosphorus and magnesium to 4/3/2 respectively, given ERIC on CKD5  Nutrition: dash/dm   Activity: as tolerated     CODE STATUS: DNR/DNI, no dialysis     Med rec:  Done with written list of meds provided by the patient.

## 2023-11-06 NOTE — ED PROVIDER NOTE - CLINICAL SUMMARY MEDICAL DECISION MAKING FREE TEXT BOX
Zack: 83 year old male with pmhx of dm, htn, hld, ckd, here with non productive cough, sob, wheezing, low grade temp and chills x 1 week. went to UC had negative covi/dflu.  here for cxr. repeating generlized weakness/malaise.  no n/v/ chest pain, no sob., no abdominal gerber, no diarrhea. PE: att exam: patient awake alert NAD . LUNGs mild expiratory wheeze b/l lungs,  no crackle. CARD RRR no m/r/g.  Abdomen soft NT ND no rebound no guarding no CVA tenderness. EXT + trace b/l edema b/l le  no calf tenderness CV 2+DP/PT bilaterally. neuro A&Ox3 gait normal.  skin warm and dry no rash    will get labs, imaging, r/o pna, r/o viral illness. given ckd and diffuse wheezing likely will need to be admitted if has infeciton.

## 2023-11-06 NOTE — ED PROVIDER NOTE - PHYSICAL EXAMINATION
Sure, refill    
CONSTITUTIONAL: Well appearing and in no apparent distress.  ENT: Airway patent, moist mucous membranes.   EYES: Pupils equal, round and reactive to light. EOMI. Conjunctiva normal appearing.   CARDIAC: Normal rate, regular rhythm.  Heart sounds S1, S2.    RESPIRATORY: Mild exp wheezing. No resp distress. O2 sat 98% on RA.   GASTROINTESTINAL: Abdomen soft, non-tender, not distended.  MUSCULOSKELETAL: Spine appears normal. Trace BLE edema.   NEUROLOGICAL: Alert and oriented x3, no focal deficits, no motor or sensory deficits. 5/5 muscle strength throughout.  SKIN: Skin normal color, warm, dry and intact.   PSYCHIATRIC: Normal mood and affect.

## 2023-11-06 NOTE — ED ADULT NURSE NOTE - CAS EDN DISCHARGE ASSESSMENT
- s/p peg placement on 1/11, tolerating tube feeds   - aspiration precautions with PEG feeds  - hob > 30 degrees Alert and oriented to person, place and time

## 2023-11-06 NOTE — H&P ADULT - HISTORY OF PRESENT ILLNESS
84 yo M with a PMH of DM2, HTN, HLD, CKD presents with non prod cough, SOB, wheezing, low grade temp and chills x 1 week. Went to UC and had negative rapid COVID/Flu.      CXR w/ RUL consolidation and CT chest also showed RUL consolidation likely PNA.   Received CTX and azithro in the ED.  84 yo M with a PMH of DM2, HTN, HLD, CKD presents with non prod cough, SOB, wheezing, low grade temp and chills x 1 week. Went to UC and had negative rapid COVID/Flu, rx'd moxifloxicin and tessalon perls but came to ED d/t concern for renal failure. Reports he was making his usual amount of urine with daily torsemide. Denies ha, dizziness, cp, abd pain, n/v/d or urinary sx.   On arrival, VSS.   CBC w/ mild leukocytosis and anemia hgb 8, chem w/ SCr 9.2 (baseline high 5s), and elevated proBNP.     CXR w/ RUL consolidation and CT chest also showed RUL consolidation likely PNA.   Received CTX and azithro in the ED.

## 2023-11-06 NOTE — ED ADULT TRIAGE NOTE - SPO2 (%)
[facility] [address] [place] [address]  Standard Office Visit  Patient ID: Eloy Rodrigues Held    : 1948  Age/Gender: 78 y o  male     DATE: 2019      Assessment/Plan:    Aneurysm of right iliac artery ([facility] [address] )  Following with vascular, had follow up imaging in January  Has scheduled regular screenings  Impaired fasting glucose  Will check A1c with next labs set  A1c labs done in  slightly elevated  COPD with emphysema  Currently doing pulm rehab  Following with pulmonology  Stable on telergy, doing well  Gave encouragement  Hyperlipidemia  Stable on lipid therapy  Will check CMP and lipids  Will continue to follow       Diagnoses and all orders for this visit:    Chronic obstructive pulmonary disease, unspecified COPD type ([facility] [address] )  -     Comprehensive metabolic panel; Future  -     CBC and differential; Future    Mixed hyperlipidemia  -     Comprehensive metabolic panel; Future  -     Lipid Panel with Direct LDL reflex; Future    Impaired fasting glucose  -     Hemoglobin A1C; Future    Aneurysm of right iliac artery (HCC)  -     Comprehensive metabolic panel; Future  -     Lipid Panel with Direct LDL reflex; Future          Subjective:   Chief Complaint   Patient presents with    Follow-up     Patient is here for 4 months f/u  Pt does not have any new complains at the moment  Joe Shaffer is a 78 y o  male who presents to the office on 2019 for     Follow up  He notes he started pulmonary rehab  He notes his breathing has been going pretty well  Notes weather has disrupted his therapy  He notes he is starting to do more weight training in therapy and is getting some muscle tone back  He is also being sent for additional testing to see about his emphysema  Smoke free for 2 years  Wife is 6+ months smoke free  Doing well at this time  No complaints or concerns  Has been taking his cholesterol medication as directed  Breathing Problem   He complains of difficulty breathing  There is no cough, shortness of breath or wheezing  This is a chronic problem  The problem has been waxing and waning  Pertinent negatives include no chest pain, fever, heartburn or sore throat  Dyspnea on exertion: Respiratory symptoms improving with pulmonary rehab  His past medical history is significant for COPD  There is no history of asthma or bronchitis  The following portions of the patient's history were reviewed and updated as appropriate: allergies, current medications, past family history, past medical history, past social history, past surgical history and problem list     Review of Systems   Constitutional: Negative for chills and fever  HENT: Negative for sore throat  Eyes: Negative for visual disturbance  Respiratory: Negative for cough, shortness of breath and wheezing  Cardiovascular: Negative for chest pain and palpitations  Dyspnea on exertion: Respiratory symptoms improving with pulmonary rehab  Gastrointestinal: Negative for abdominal pain, diarrhea, heartburn, nausea and vomiting  Genitourinary: Negative for dysuria  Skin: Negative for rash  Neurological: Negative for syncope, weakness and light-headedness  Psychiatric/Behavioral: Negative for agitation           Patient Active Problem List   Diagnosis    Hyperlipidemia    COPD with emphysema    Aneurysm of right iliac artery (HCC)    Arterial ectasia (HCC)    Diverticulosis, sigmoid    Dyspnea on exertion    Hearing loss    Hemorrhoids    Lung nodule    Impaired fasting glucose    Skin disorder    BPH with obstruction/lower urinary tract symptoms       Past Medical History:   Diagnosis Date    BPH (benign prostatic hyperplasia)     COPD (chronic obstructive pulmonary disease) (HCC)     Last Assessed:11/14/2016    Diverticulosis     Elevated prostate specific antigen (PSA)     Emphysema (subcutaneous) (surgical) resulting from a procedure     98 Enlarged prostate with lower urinary tract symptoms (LUTS)     Resolved:8/22/2017    Hernia, inguinal, right        Past Surgical History:   Procedure Laterality Date    COLONOSCOPY      HAND SURGERY      Last Assessed:7/11/2016    HERNIA REPAIR      Last Assessed:7/11/2016    KNEE ARTHROSCOPY      NM REPAIR ING HERNIA,5+Y/O,REDUCIBL Right 2/23/2016    Procedure: REPAIR HERNIA INGUINAL with mesh;  Surgeon: Maria Alejandra Clark DO;  [facility]: [facility];  Service: General    PROSTATE BIOPSY      WISDOM TOOTH EXTRACTION           Current Outpatient Medications:     albuterol (PROVENTIL HFA,VENTOLIN HFA) 90 mcg/act inhaler, Inhale 2 puffs every 6 (six) hours as needed for wheezing, Disp: 3 Inhaler, Rfl: 1    finasteride (PROSCAR) 5 mg tablet, Take 1 tablet (5 mg total) by mouth daily, Disp: 90 tablet, Rfl: 3    fluticasone-umeclidinium-vilanterol (TRELEGY ELLIPTA) 100-62 5-25 MCG/INH inhaler, Inhale 1 puff daily Rinse mouth after use , Disp: 3 Inhaler, Rfl: 3    guaiFENesin (MUCINEX) 600 mg 12 hr tablet, Take 1,200 mg by mouth 2 (two) times a day as needed, Disp: , Rfl:     ipratropium-albuterol (DUO-NEB) 0 5-2 5 mg/3 mL nebulizer solution, Take 1 vial (3 mL total) by nebulization every 6 (six) hours, Disp: 1 mL, Rfl: 0    loratadine (CLARITIN REDITABS) 10 MG dissolvable tablet, Take 10 mg by mouth daily as needed for allergies  , Disp: , Rfl:     Multiple Vitamins-Minerals (CENTRUM ADULTS PO), Take 1 tablet by mouth daily  , Disp: , Rfl:     simvastatin (ZOCOR) 20 mg tablet, Take 1 tablet (20 mg total) by mouth daily at bedtime, Disp: 90 tablet, Rfl: 3    tamsulosin (FLOMAX) 0 4 mg, Take 1 capsule (0 4 mg total) by mouth daily with dinner, Disp: 90 capsule, Rfl: 3    Allergies   Allergen Reactions    Bee Venom Facial Swelling    Other Rash     Annotation - [id number]: mushrooms    Penicillins Rash       Social History     Socioeconomic History    Marital status: /Civil Union     Spouse name: None  Number of children: None    Years of education: None    Highest education level: None   Occupational History    None   Social Needs    Financial resource strain: None    Food insecurity:     Worry: None     Inability: None    Transportation needs:     Medical: None     Non-medical: None   Tobacco Use    Smoking status: Former Smoker     Packs/day: 1 50     Years: 49 00     Pack years: 73 50     Types: Cigarettes     Last attempt to quit: 11/11/2015     Years since quitting: 3 2    Smokeless tobacco: Never Used    Tobacco comment: Former smoker as per [employer]   Substance and Sexual Activity    Alcohol use: No     Comment: Social drinker as per [employer]    Drug use: No    Sexual activity: None   Lifestyle    Physical activity:     Days per week: None     Minutes per session: None    Stress: None   Relationships    Social connections:     Talks on phone: None     Gets together: None     Attends Oriental orthodox service: None     Active member of club or organization: None     Attends meetings of clubs or organizations: None     Relationship status: None    Intimate partner violence:     Fear of current or ex partner: None     Emotionally abused: None     Physically abused: None     Forced sexual activity: None   Other Topics Concern    None   Social History Narrative    Advance directive on file       Family History   Problem Relation Age of Onset    Hypertension Mother        PHQ-9 Depression Screening    PHQ-9:    Frequency of the following problems over the past two weeks:              Health Maintenance   Topic Date Due   [facility] Annual Wellness Visit (AWV)  1948    SLP PLAN OF CARE  1948    BMI: Followup Plan  11/21/1968    Depression Screening PHQ  01/24/2020    Fall Risk  02/22/2020    BMI: Adult  02/22/2020    CRC Screening: Colonoscopy  07/25/2026    DTaP,Tdap,and Td Vaccines (2 - Td) 06/01/2028    Hepatitis C Screening  Completed    INFLUENZA VACCINE  Completed    Pneumococcal PPSV23/PCV13 65+ Years / Low and Medium Risk  Completed    HEPATITIS B VACCINES  Aged Out       Immunization History   Administered Date(s) Administered    INFLUENZA 11/06/2006, 09/28/2007, 11/01/2016, 10/10/2017    Influenza Split High Dose Preservative Free IM 11/01/2016, 10/10/2017    Influenza, high dose seasonal 0 5 mL 10/19/2018    Pneumococcal Conjugate 13-Valent 07/11/2016    Pneumococcal Polysaccharide PPV23 10/10/2017        Objective:  Vitals:    02/22/19 1301 02/22/19 1415   BP: 134/90 130/88   BP Location: Right arm Left arm   Patient Position: Sitting Sitting   Cuff Size: Standard Standard   Pulse: 57    Temp: 97 9 °F (36 6 °C)    TempSrc: Oral    SpO2: 97%    Weight: 76 7 kg (169 lb)    Height: 5' 8" (1 727 m)      Wt Readings from Last 3 Encounters:   02/22/19 76 7 kg (169 lb)   01/24/19 73 9 kg (163 lb)   01/03/19 75 3 kg (166 lb)     Body mass index is 25 7 kg/m²  No exam data present       Physical Exam   Constitutional: He is oriented to person, place, and time  He appears well-developed and well-nourished  No distress  Alert pleasant cooperative  Seated in room in no acute distress   HENT:   Head: Normocephalic and atraumatic  Mouth/Throat: Oropharynx is clear and moist  No oropharyngeal exudate  Eyes: Pupils are equal, round, and reactive to light  Right eye exhibits no discharge  Left eye exhibits no discharge  No scleral icterus  Neck: Neck supple  Cardiovascular: Normal rate, regular rhythm and normal heart sounds  No murmur heard  Pulmonary/Chest: Effort normal and breath sounds normal  No respiratory distress  He has no wheezes  He has no rales  Slight decreased breath sounds posteriorly bilaterally  No crackles no rhonchi no wheeze   Abdominal: Soft  Bowel sounds are normal  He exhibits no distension  There is no tenderness  There is no guarding  Soft nontender nondistended positive bowel sounds   Musculoskeletal: He exhibits no edema  Neurological: He is alert and oriented to person, place, and time  Skin: Skin is warm and dry  No rash noted  He is not diaphoretic  Psychiatric: He has a normal mood and affect  His behavior is normal  Thought content normal    Nursing note and vitals reviewed            Future Appointments   Date Time Provider Isacc Rollei   2/26/2019  9:00 AM [facility] PULM EXERCISE ROOM [facility] [place]   2/28/2019  9:00 AM [facility] PULM EXERCISE ROOM [facility] [place]   2/28/2019  1:00 PM Alonso Casiano MD [facility]   3/5/2019  9:00 AM [facility] PULM EXERCISE ROOM [facility] [place]   3/7/2019  9:00 AM [facility] PULM EXERCISE ROOM [facility] [place]   3/12/2019  9:00 AM [facility] PULM EXERCISE ROOM [facility] [place]   3/14/2019  9:00 AM [facility] PULM EXERCISE ROOM [facility] [place]   3/19/2019  9:00 AM [facility] PULM EXERCISE ROOM [facility] [place]   3/21/2019  9:00 AM [facility] PULM EXERCISE ROOM [facility] [place]   3/26/2019  9:00 AM [facility] PULM EXERCISE ROOM [facility] [place]   3/28/2019  9:00 AM [facility] PULM EXERCISE ROOM [facility] [place]   4/2/2019  9:00 AM [facility] PULM EXERCISE ROOM [facility] [place]   4/4/2019  9:00 AM [facility] PULM EXERCISE ROOM [facility] [place]   4/9/2019  9:20 AM Gail Patricio MD [facility]   4/9/2019 10:00 AM [facility] PULM EXERCISE ROOM [facility] [place]   4/11/2019  9:00 AM [facility] PULM EXERCISE ROOM [facility] [place]   4/16/2019  9:00 AM [facility] PULM EXERCISE ROOM [facility] [place]   4/18/2019  9:00 AM [facility] PULM EXERCISE ROOM [facility] [place]   4/23/2019  9:00 AM [facility] PULM EXERCISE ROOM [facility] [place]   8/7/2019  9:00 AM BE CT [facility] 1 BE SLN CT [facility] [place]   8/30/2019  9:15 AM Germain Li MD [facility]       Jovan Cordova PA-C  ST [address] [place] [address]    Patient Care Team:  Cynthia Ortega MD as PCP - MD Ayden Michael, Sherman Morocho MD

## 2023-11-06 NOTE — H&P ADULT - PROBLEM SELECTOR PLAN 2
-baseline Cr high 5s with GFR in 9-10. On arrival, Scr 9.24 and eGFR 5  -no signs of uremia at this time  -per previous documentation from neph, pt had opted to medical mgnt  -pt likely in slight fluid overload state. will give additional torsemide?   -chronic metabolic acidosis iso CKD5. start bicarb?   -nephro consult in the morning  -consider palliative consult if renal fx does not improve and pt still chooses to manage medically. -baseline Cr high 5s with GFR in 9-10. On arrival, Scr 9.24 and eGFR 5  -no signs of uremia at this time  -per previous documentation from neph, pt had opted to medical mgnt. Pt confirms that he still does not want dialysis and understands that this could lead to his death (see [facility] discussion above).   -pt appears dry on exam.   -will order for 250cc LR and re-evaluate.   -nephro consult in the morning. Pt opted to discuss starting NaBicarb with nephrology instead.   -consider palliative consult if renal fx does not improve and pt still chooses to manage medically. -baseline Cr high 5s with GFR in 9-10. On arrival, Scr 9.24 and eGFR 5  -no signs of uremia at this time  -per previous documentation from neph, pt had opted to medical mgnt. Pt confirms that he still does not want dialysis and understands that this could lead to his death (see [facility] discussion above).   -pt appears dry on exam.   -will order for 250cc LR and re-evaluate.   -nephro consult in the morning. Pt opted to discuss starting NaBicarb with nephrology instead.   -consider palliative consult if renal fx does not improve and pt still chooses to manage medically.  -elevated proBNP and mildly elevated and flat trop:  iso CKD and acute pna. pt is NOT in [facility].

## 2023-11-06 NOTE — ED PROVIDER NOTE - OBJECTIVE STATEMENT
84 yo M with a PMH of DM, HTN, HLD, CKD presents with non prod cough, SOB, wheezing, low grade temp and chills x 1 week. Went to UC and had negative rapid COVID/Flu, pending PCR. Was advised to come to ED for CXR and further eval. Pt reporting he has been feeling malaise/generalized weakness. Denies nausea, vomiting, chest pain, SOB, abd pain, diarrhea, urinary complaints, headache, dizziness, syncope. No known sick contacts or recent travel.

## 2023-11-06 NOTE — ED PROVIDER NOTE - NSICDXPASTMEDICALHX_GEN_ALL_CORE_FT
PAST MEDICAL HISTORY:  BPH (benign prostatic hyperplasia)     Diabetes     Hyperlipidemia     Hypertension     MGUS (monoclonal gammopathy of unknown significance)     Renal lesion

## 2023-11-07 DIAGNOSIS — E87.20 ACIDOSIS, UNSPECIFIED: ICD-10-CM

## 2023-11-07 DIAGNOSIS — D64.9 ANEMIA, UNSPECIFIED: ICD-10-CM

## 2023-11-07 LAB
A1C WITH ESTIMATED AVERAGE GLUCOSE RESULT: 5.5 % — SIGNIFICANT CHANGE UP (ref 4–5.6)
A1C WITH ESTIMATED AVERAGE GLUCOSE RESULT: 5.5 % — SIGNIFICANT CHANGE UP (ref 4–5.6)
ALBUMIN SERPL ELPH-MCNC: 2.9 G/DL — LOW (ref 3.3–5)
ALBUMIN SERPL ELPH-MCNC: 2.9 G/DL — LOW (ref 3.3–5)
ALP SERPL-CCNC: 79 U/L — SIGNIFICANT CHANGE UP (ref 40–120)
ALP SERPL-CCNC: 79 U/L — SIGNIFICANT CHANGE UP (ref 40–120)
ALT FLD-CCNC: 13 U/L — SIGNIFICANT CHANGE UP (ref 10–45)
ALT FLD-CCNC: 13 U/L — SIGNIFICANT CHANGE UP (ref 10–45)
ANION GAP SERPL CALC-SCNC: 19 MMOL/L — HIGH (ref 5–17)
ANION GAP SERPL CALC-SCNC: 19 MMOL/L — HIGH (ref 5–17)
ANION GAP SERPL CALC-SCNC: 20 MMOL/L — HIGH (ref 5–17)
ANION GAP SERPL CALC-SCNC: 20 MMOL/L — HIGH (ref 5–17)
APPEARANCE UR: CLEAR — SIGNIFICANT CHANGE UP
APPEARANCE UR: CLEAR — SIGNIFICANT CHANGE UP
AST SERPL-CCNC: 11 U/L — SIGNIFICANT CHANGE UP (ref 10–40)
AST SERPL-CCNC: 11 U/L — SIGNIFICANT CHANGE UP (ref 10–40)
BACTERIA # UR AUTO: NEGATIVE /HPF — SIGNIFICANT CHANGE UP
BACTERIA # UR AUTO: NEGATIVE /HPF — SIGNIFICANT CHANGE UP
BILIRUB SERPL-MCNC: 0.1 MG/DL — LOW (ref 0.2–1.2)
BILIRUB SERPL-MCNC: 0.1 MG/DL — LOW (ref 0.2–1.2)
BILIRUB UR-MCNC: NEGATIVE — SIGNIFICANT CHANGE UP
BILIRUB UR-MCNC: NEGATIVE — SIGNIFICANT CHANGE UP
BUN SERPL-MCNC: 102 MG/DL — HIGH (ref 7–23)
BUN SERPL-MCNC: 102 MG/DL — HIGH (ref 7–23)
BUN SERPL-MCNC: 103 MG/DL — HIGH (ref 7–23)
BUN SERPL-MCNC: 103 MG/DL — HIGH (ref 7–23)
CALCIUM SERPL-MCNC: 8.5 MG/DL — SIGNIFICANT CHANGE UP (ref 8.4–10.5)
CALCIUM SERPL-MCNC: 8.5 MG/DL — SIGNIFICANT CHANGE UP (ref 8.4–10.5)
CALCIUM SERPL-MCNC: 8.9 MG/DL — SIGNIFICANT CHANGE UP (ref 8.4–10.5)
CALCIUM SERPL-MCNC: 8.9 MG/DL — SIGNIFICANT CHANGE UP (ref 8.4–10.5)
CAST: 7 /LPF — HIGH (ref 0–4)
CAST: 7 /LPF — HIGH (ref 0–4)
CHLORIDE SERPL-SCNC: 103 MMOL/L — SIGNIFICANT CHANGE UP (ref 96–108)
CHLORIDE SERPL-SCNC: 103 MMOL/L — SIGNIFICANT CHANGE UP (ref 96–108)
CHLORIDE SERPL-SCNC: 104 MMOL/L — SIGNIFICANT CHANGE UP (ref 96–108)
CHLORIDE SERPL-SCNC: 104 MMOL/L — SIGNIFICANT CHANGE UP (ref 96–108)
CO2 SERPL-SCNC: 11 MMOL/L — LOW (ref 22–31)
CO2 SERPL-SCNC: 11 MMOL/L — LOW (ref 22–31)
CO2 SERPL-SCNC: 13 MMOL/L — LOW (ref 22–31)
CO2 SERPL-SCNC: 13 MMOL/L — LOW (ref 22–31)
COLOR SPEC: YELLOW — SIGNIFICANT CHANGE UP
COLOR SPEC: YELLOW — SIGNIFICANT CHANGE UP
CREAT ?TM UR-MCNC: 96 MG/DL — SIGNIFICANT CHANGE UP
CREAT ?TM UR-MCNC: 96 MG/DL — SIGNIFICANT CHANGE UP
CREAT SERPL-MCNC: 9.75 MG/DL — HIGH (ref 0.5–1.3)
CREAT SERPL-MCNC: 9.75 MG/DL — HIGH (ref 0.5–1.3)
CREAT SERPL-MCNC: 9.82 MG/DL — HIGH (ref 0.5–1.3)
CREAT SERPL-MCNC: 9.82 MG/DL — HIGH (ref 0.5–1.3)
DIFF PNL FLD: ABNORMAL
DIFF PNL FLD: ABNORMAL
EGFR: 5 ML/MIN/1.73M2 — LOW
ESTIMATED AVERAGE GLUCOSE: 111 MG/DL — SIGNIFICANT CHANGE UP (ref 68–114)
ESTIMATED AVERAGE GLUCOSE: 111 MG/DL — SIGNIFICANT CHANGE UP (ref 68–114)
GLUCOSE SERPL-MCNC: 101 MG/DL — HIGH (ref 70–99)
GLUCOSE SERPL-MCNC: 101 MG/DL — HIGH (ref 70–99)
GLUCOSE SERPL-MCNC: 128 MG/DL — HIGH (ref 70–99)
GLUCOSE SERPL-MCNC: 128 MG/DL — HIGH (ref 70–99)
GLUCOSE UR QL: NEGATIVE MG/DL — SIGNIFICANT CHANGE UP
GLUCOSE UR QL: NEGATIVE MG/DL — SIGNIFICANT CHANGE UP
HCT VFR BLD CALC: 21.3 % — LOW (ref 39–50)
HCT VFR BLD CALC: 21.3 % — LOW (ref 39–50)
HCT VFR BLD CALC: 23.8 % — LOW (ref 39–50)
HCT VFR BLD CALC: 23.8 % — LOW (ref 39–50)
HGB BLD-MCNC: 7 G/DL — CRITICAL LOW (ref 13–17)
HGB BLD-MCNC: 7 G/DL — CRITICAL LOW (ref 13–17)
HGB BLD-MCNC: 7.8 G/DL — LOW (ref 13–17)
HGB BLD-MCNC: 7.8 G/DL — LOW (ref 13–17)
KETONES UR-MCNC: NEGATIVE MG/DL — SIGNIFICANT CHANGE UP
KETONES UR-MCNC: NEGATIVE MG/DL — SIGNIFICANT CHANGE UP
LEGIONELLA AG UR QL: NEGATIVE — SIGNIFICANT CHANGE UP
LEGIONELLA AG UR QL: NEGATIVE — SIGNIFICANT CHANGE UP
LEUKOCYTE ESTERASE UR-ACNC: NEGATIVE — SIGNIFICANT CHANGE UP
LEUKOCYTE ESTERASE UR-ACNC: NEGATIVE — SIGNIFICANT CHANGE UP
MAGNESIUM SERPL-MCNC: 2.7 MG/DL — HIGH (ref 1.6–2.6)
MAGNESIUM SERPL-MCNC: 2.7 MG/DL — HIGH (ref 1.6–2.6)
MCHC RBC-ENTMCNC: 29.8 PG — SIGNIFICANT CHANGE UP (ref 27–34)
MCHC RBC-ENTMCNC: 29.8 PG — SIGNIFICANT CHANGE UP (ref 27–34)
MCHC RBC-ENTMCNC: 29.9 PG — SIGNIFICANT CHANGE UP (ref 27–34)
MCHC RBC-ENTMCNC: 29.9 PG — SIGNIFICANT CHANGE UP (ref 27–34)
MCHC RBC-ENTMCNC: 32.8 GM/DL — SIGNIFICANT CHANGE UP (ref 32–36)
MCHC RBC-ENTMCNC: 32.8 GM/DL — SIGNIFICANT CHANGE UP (ref 32–36)
MCHC RBC-ENTMCNC: 32.9 GM/DL — SIGNIFICANT CHANGE UP (ref 32–36)
MCHC RBC-ENTMCNC: 32.9 GM/DL — SIGNIFICANT CHANGE UP (ref 32–36)
MCV RBC AUTO: 90.6 FL — SIGNIFICANT CHANGE UP (ref 80–100)
MCV RBC AUTO: 90.6 FL — SIGNIFICANT CHANGE UP (ref 80–100)
MCV RBC AUTO: 91.2 FL — SIGNIFICANT CHANGE UP (ref 80–100)
MCV RBC AUTO: 91.2 FL — SIGNIFICANT CHANGE UP (ref 80–100)
MRSA PCR RESULT.: SIGNIFICANT CHANGE UP
MRSA PCR RESULT.: SIGNIFICANT CHANGE UP
NITRITE UR-MCNC: NEGATIVE — SIGNIFICANT CHANGE UP
NITRITE UR-MCNC: NEGATIVE — SIGNIFICANT CHANGE UP
NRBC # BLD: 0 /100 WBCS — SIGNIFICANT CHANGE UP (ref 0–0)
PH UR: 5.5 — SIGNIFICANT CHANGE UP (ref 5–8)
PH UR: 5.5 — SIGNIFICANT CHANGE UP (ref 5–8)
PHOSPHATE SERPL-MCNC: 6.3 MG/DL — HIGH (ref 2.5–4.5)
PHOSPHATE SERPL-MCNC: 6.3 MG/DL — HIGH (ref 2.5–4.5)
PLATELET # BLD AUTO: 170 K/UL — SIGNIFICANT CHANGE UP (ref 150–400)
PLATELET # BLD AUTO: 170 K/UL — SIGNIFICANT CHANGE UP (ref 150–400)
PLATELET # BLD AUTO: 192 K/UL — SIGNIFICANT CHANGE UP (ref 150–400)
PLATELET # BLD AUTO: 192 K/UL — SIGNIFICANT CHANGE UP (ref 150–400)
POTASSIUM SERPL-MCNC: 4.8 MMOL/L — SIGNIFICANT CHANGE UP (ref 3.5–5.3)
POTASSIUM SERPL-MCNC: 4.8 MMOL/L — SIGNIFICANT CHANGE UP (ref 3.5–5.3)
POTASSIUM SERPL-MCNC: 5 MMOL/L — SIGNIFICANT CHANGE UP (ref 3.5–5.3)
POTASSIUM SERPL-MCNC: 5 MMOL/L — SIGNIFICANT CHANGE UP (ref 3.5–5.3)
POTASSIUM SERPL-SCNC: 4.8 MMOL/L — SIGNIFICANT CHANGE UP (ref 3.5–5.3)
POTASSIUM SERPL-SCNC: 4.8 MMOL/L — SIGNIFICANT CHANGE UP (ref 3.5–5.3)
POTASSIUM SERPL-SCNC: 5 MMOL/L — SIGNIFICANT CHANGE UP (ref 3.5–5.3)
POTASSIUM SERPL-SCNC: 5 MMOL/L — SIGNIFICANT CHANGE UP (ref 3.5–5.3)
PROT ?TM UR-MCNC: 253 MG/DL — HIGH (ref 0–12)
PROT ?TM UR-MCNC: 253 MG/DL — HIGH (ref 0–12)
PROT SERPL-MCNC: 6 G/DL — SIGNIFICANT CHANGE UP (ref 6–8.3)
PROT SERPL-MCNC: 6 G/DL — SIGNIFICANT CHANGE UP (ref 6–8.3)
PROT UR-MCNC: 300 MG/DL
PROT UR-MCNC: 300 MG/DL
PROT/CREAT UR-RTO: 2.6 RATIO — HIGH (ref 0–0.2)
PROT/CREAT UR-RTO: 2.6 RATIO — HIGH (ref 0–0.2)
RBC # BLD: 2.35 M/UL — LOW (ref 4.2–5.8)
RBC # BLD: 2.35 M/UL — LOW (ref 4.2–5.8)
RBC # BLD: 2.61 M/UL — LOW (ref 4.2–5.8)
RBC # BLD: 2.61 M/UL — LOW (ref 4.2–5.8)
RBC # FLD: 15.6 % — HIGH (ref 10.3–14.5)
RBC CASTS # UR COMP ASSIST: 0 /HPF — SIGNIFICANT CHANGE UP (ref 0–4)
RBC CASTS # UR COMP ASSIST: 0 /HPF — SIGNIFICANT CHANGE UP (ref 0–4)
REVIEW: SIGNIFICANT CHANGE UP
REVIEW: SIGNIFICANT CHANGE UP
S AUREUS DNA NOSE QL NAA+PROBE: SIGNIFICANT CHANGE UP
S AUREUS DNA NOSE QL NAA+PROBE: SIGNIFICANT CHANGE UP
S PNEUM AG UR QL: NEGATIVE — SIGNIFICANT CHANGE UP
S PNEUM AG UR QL: NEGATIVE — SIGNIFICANT CHANGE UP
SODIUM SERPL-SCNC: 135 MMOL/L — SIGNIFICANT CHANGE UP (ref 135–145)
SODIUM UR-SCNC: 32 MMOL/L — SIGNIFICANT CHANGE UP
SODIUM UR-SCNC: 32 MMOL/L — SIGNIFICANT CHANGE UP
SP GR SPEC: 1.01 — SIGNIFICANT CHANGE UP (ref 1–1.03)
SP GR SPEC: 1.01 — SIGNIFICANT CHANGE UP (ref 1–1.03)
SQUAMOUS # UR AUTO: 1 /HPF — SIGNIFICANT CHANGE UP (ref 0–5)
SQUAMOUS # UR AUTO: 1 /HPF — SIGNIFICANT CHANGE UP (ref 0–5)
UROBILINOGEN FLD QL: 0.2 MG/DL — SIGNIFICANT CHANGE UP (ref 0.2–1)
UROBILINOGEN FLD QL: 0.2 MG/DL — SIGNIFICANT CHANGE UP (ref 0.2–1)
WBC # BLD: 8.62 K/UL — SIGNIFICANT CHANGE UP (ref 3.8–10.5)
WBC # BLD: 8.62 K/UL — SIGNIFICANT CHANGE UP (ref 3.8–10.5)
WBC # BLD: 8.75 K/UL — SIGNIFICANT CHANGE UP (ref 3.8–10.5)
WBC # BLD: 8.75 K/UL — SIGNIFICANT CHANGE UP (ref 3.8–10.5)
WBC # FLD AUTO: 8.62 K/UL — SIGNIFICANT CHANGE UP (ref 3.8–10.5)
WBC # FLD AUTO: 8.62 K/UL — SIGNIFICANT CHANGE UP (ref 3.8–10.5)
WBC # FLD AUTO: 8.75 K/UL — SIGNIFICANT CHANGE UP (ref 3.8–10.5)
WBC # FLD AUTO: 8.75 K/UL — SIGNIFICANT CHANGE UP (ref 3.8–10.5)
WBC UR QL: 3 /HPF — SIGNIFICANT CHANGE UP (ref 0–5)
WBC UR QL: 3 /HPF — SIGNIFICANT CHANGE UP (ref 0–5)

## 2023-11-07 PROCEDURE — 99223 1ST HOSP IP/OBS HIGH 75: CPT | Mod: GC

## 2023-11-07 PROCEDURE — 99233 SBSQ HOSP IP/OBS HIGH 50: CPT

## 2023-11-07 RX ORDER — ERYTHROPOIETIN 10000 [IU]/ML
10000 INJECTION, SOLUTION INTRAVENOUS; SUBCUTANEOUS
Refills: 0 | Status: DISCONTINUED | OUTPATIENT
Start: 2023-11-07 | End: 2023-11-08

## 2023-11-07 RX ORDER — ERYTHROPOIETIN 10000 [IU]/ML
10000 INJECTION, SOLUTION INTRAVENOUS; SUBCUTANEOUS
Refills: 0 | Status: DISCONTINUED | OUTPATIENT
Start: 2023-11-07 | End: 2023-11-07

## 2023-11-07 RX ORDER — SODIUM BICARBONATE 1 MEQ/ML
650 SYRINGE (ML) INTRAVENOUS THREE TIMES A DAY
Refills: 0 | Status: DISCONTINUED | OUTPATIENT
Start: 2023-11-07 | End: 2023-11-07

## 2023-11-07 RX ORDER — SODIUM BICARBONATE 1 MEQ/ML
1300 SYRINGE (ML) INTRAVENOUS THREE TIMES A DAY
Refills: 0 | Status: DISCONTINUED | OUTPATIENT
Start: 2023-11-07 | End: 2023-11-10

## 2023-11-07 RX ADMIN — AMLODIPINE BESYLATE 5 MILLIGRAM(S): 2.5 TABLET ORAL at 22:27

## 2023-11-07 RX ADMIN — Medication 650 MILLIGRAM(S): at 22:31

## 2023-11-07 RX ADMIN — LORATADINE 10 MILLIGRAM(S): 10 TABLET ORAL at 10:01

## 2023-11-07 RX ADMIN — DORZOLAMIDE HYDROCHLORIDE TIMOLOL MALEATE 1 DROP(S): 20; 5 SOLUTION/ DROPS OPHTHALMIC at 23:15

## 2023-11-07 RX ADMIN — DORZOLAMIDE HYDROCHLORIDE TIMOLOL MALEATE 1 DROP(S): 20; 5 SOLUTION/ DROPS OPHTHALMIC at 05:54

## 2023-11-07 RX ADMIN — Medication 8 MILLIGRAM(S): at 22:27

## 2023-11-07 RX ADMIN — Medication 3 MILLILITER(S): at 19:07

## 2023-11-07 RX ADMIN — SODIUM CHLORIDE 75 MILLILITER(S): 9 INJECTION, SOLUTION INTRAVENOUS at 19:07

## 2023-11-07 RX ADMIN — Medication 3 MILLILITER(S): at 14:41

## 2023-11-07 RX ADMIN — FINASTERIDE 5 MILLIGRAM(S): 5 TABLET, FILM COATED ORAL at 10:02

## 2023-11-07 RX ADMIN — Medication 1300 MILLIGRAM(S): at 22:27

## 2023-11-07 RX ADMIN — LATANOPROST 1 DROP(S): 0.05 SOLUTION/ DROPS OPHTHALMIC; TOPICAL at 22:27

## 2023-11-07 RX ADMIN — Medication 3 MILLILITER(S): at 05:54

## 2023-11-07 RX ADMIN — ERYTHROPOIETIN 10000 UNIT(S): 10000 INJECTION, SOLUTION INTRAVENOUS; SUBCUTANEOUS at 16:16

## 2023-11-07 RX ADMIN — CEFTRIAXONE 100 MILLIGRAM(S): 500 INJECTION, POWDER, FOR SOLUTION INTRAMUSCULAR; INTRAVENOUS at 16:04

## 2023-11-07 RX ADMIN — Medication 3 MILLILITER(S): at 23:15

## 2023-11-07 RX ADMIN — SODIUM CHLORIDE 75 MILLILITER(S): 9 INJECTION, SOLUTION INTRAVENOUS at 06:09

## 2023-11-07 RX ADMIN — Medication 650 MILLIGRAM(S): at 13:21

## 2023-11-07 RX ADMIN — ATENOLOL 50 MILLIGRAM(S): 25 TABLET ORAL at 06:07

## 2023-11-07 NOTE — PROGRESS NOTE ADULT - NSPROGADDITIONALINFOA_GEN_ALL_CORE
The necessity of the time spent during the encounter on this date of service was due to:   - Ordering, reviewing, and interpreting labs, testing, and imaging  - Independently obtaining a review of systems and performing a physical exam  - Reviewing prior hospitalization and where necessary, outpatient records  - Reviewing consultant recommendations/communicating with consultants  - Counselling and educating patient and family regarding interpretation of aforementioned items and plan of care    Time-based billing (NON-critical care). Total minutes spent: 60

## 2023-11-07 NOTE — PROGRESS NOTE ADULT - SUBJECTIVE AND OBJECTIVE BOX
[facility] Division of Hospital Medicine  Abner Gomez MD  Available via MS Teams    SUBJECTIVE / OVERNIGHT EVENTS: No acute events overnight. Pt seen and examined at bedside. Denies any dysuria, sob.      MEDICATIONS  (STANDING):  albuterol/ipratropium for Nebulization 3 milliLiter(s) Nebulizer every 6 hours  amLODIPine   Tablet 5 milliGRAM(s) Oral <User Schedule>  atenolol  Tablet 50 milliGRAM(s) Oral daily  azithromycin  IVPB 500 milliGRAM(s) IV Intermittent every 24 hours  cefTRIAXone   IVPB 1000 milliGRAM(s) IV Intermittent every 24 hours  dorzolamide 2%/timolol 0.5% Ophthalmic Solution 1 Drop(s) Both EYES two times a day  doxazosin 8 milliGRAM(s) Oral daily  finasteride 5 milliGRAM(s) Oral daily  lactated ringers. 250 milliLiter(s) (75 mL/Hr) IV Continuous <Continuous>  latanoprost 0.005% Ophthalmic Solution 1 Drop(s) Both EYES at bedtime  loratadine 10 milliGRAM(s) Oral daily  sodium bicarbonate 650 milliGRAM(s) Oral three times a day    MEDICATIONS  (PRN):  acetaminophen     Tablet .. 650 milliGRAM(s) Oral every 6 hours PRN Temp greater or equal to 38C (100.4F), Mild Pain (1 - 3)  albuterol/ipratropium for Nebulization. 3 milliLiter(s) Nebulizer once PRN Shortness of Breath and/or Wheezing  aluminum hydroxide/magnesium hydroxide/simethicone Suspension 30 milliLiter(s) Oral every 4 hours PRN Dyspepsia  melatonin 3 milliGRAM(s) Oral at bedtime PRN Insomnia  ondansetron Injectable 4 milliGRAM(s) IV Push every 8 hours PRN Nausea and/or Vomiting      I&O's Summary      PHYSICAL EXAM:  Vital Signs Last 24 Hrs  T(C): 36.8 (07 Nov 2023 10:29), Max: 37.7 (07 Nov 2023 07:07)  T(F): 98.2 (07 Nov 2023 10:29), Max: 99.8 (07 Nov 2023 07:07)  HR: 72 (07 Nov 2023 10:29) (71 - 78)  BP: 132/72 (07 Nov 2023 10:29) (126/63 - 155/64)  BP(mean): 87 (06 Nov 2023 23:09) (87 - 100)  RR: 18 (07 Nov 2023 10:29) (16 - 26)  SpO2: 95% (07 Nov 2023 10:29) (95% - 98%)    Parameters below as of 07 Nov 2023 10:29  Patient On (Oxygen Delivery Method): room air      CONSTITUTIONAL: NAD, well appearing  EYES: PERRL; conjunctiva and sclera clear  ENMT: Moist oral mucosa, no pharyngeal injection or exudates; normal dentition  RESPIRATORY: CTABL; no wheezing  CARDIOVASCULAR: RRR, s1, s2  ABDOMEN: Nontender to palpation, normoactive bowel sounds  PSYCH: A+O to person, place, and time; affect appropriate  NEUROLOGY: CN 2-12 are intact and symmetric; no gross sensory deficits   SKIN: No rashes; no palpable lesions    LABS:                        7.8    8.75  )-----------( 192      ( 07 Nov 2023 08:32 )             23.8     11-07    135  |  103  |  103<H>  ----------------------------<  128<H>  5.0   |  13<L>  |  9.75<H>    Ca    8.9      07 Nov 2023 08:32  Phos  6.3     11-07  Mg     2.7     11-07    TPro  6.0  /  Alb  2.9<L>  /  TBili  0.1<L>  /  DBili  x   /  AST  11  /  ALT  13  /  AlkPhos  79  11-07    PT/INR - ( 06 Nov 2023 15:29 )   PT: 12.7 sec;   INR: 1.22 ratio         PTT - ( 06 Nov 2023 15:29 )  PTT:28.5 sec      Urinalysis Basic - ( 07 Nov 2023 08:32 )    Color: x / Appearance: x / SG: x / pH: x  Gluc: 128 mg/dL / Ketone: x  / Bili: x / Urobili: x   Blood: x / Protein: x / Nitrite: x   Leuk Esterase: x / RBC: x / WBC x   Sq Epi: x / Non Sq Epi: x / Bacteria: x      Venous: 11-06-23 @ 15:11 FiO2: -- Oxygen Sat% 58.3      RADIOLOGY & ADDITIONAL TESTS:  New Results Reviewed Today:   New Imaging Personally Reviewed Today:  New Electrocardiogram Personally Reviewed Today:  Prior or Outpatient Records Reviewed Today:    COMMUNICATION:  Care Discussed with Consultants/Other Providers and Details of Discussion: Discussed with ACP  Discussions with Patient/Family:  PCP Communication: [facility] Division of Hospital Medicine  Abner Gomez MD  Available via MS Teams    SUBJECTIVE / OVERNIGHT EVENTS: No acute events overnight. Pt seen and examined at bedside. Denies any dysuria, sob.      MEDICATIONS  (STANDING):  albuterol/ipratropium for Nebulization 3 milliLiter(s) Nebulizer every 6 hours  amLODIPine   Tablet 5 milliGRAM(s) Oral <User Schedule>  atenolol  Tablet 50 milliGRAM(s) Oral daily  azithromycin  IVPB 500 milliGRAM(s) IV Intermittent every 24 hours  cefTRIAXone   IVPB 1000 milliGRAM(s) IV Intermittent every 24 hours  dorzolamide 2%/timolol 0.5% Ophthalmic Solution 1 Drop(s) Both EYES two times a day  doxazosin 8 milliGRAM(s) Oral daily  finasteride 5 milliGRAM(s) Oral daily  lactated ringers. 250 milliLiter(s) (75 mL/Hr) IV Continuous <Continuous>  latanoprost 0.005% Ophthalmic Solution 1 Drop(s) Both EYES at bedtime  loratadine 10 milliGRAM(s) Oral daily  sodium bicarbonate 650 milliGRAM(s) Oral three times a day    MEDICATIONS  (PRN):  acetaminophen     Tablet .. 650 milliGRAM(s) Oral every 6 hours PRN Temp greater or equal to 38C (100.4F), Mild Pain (1 - 3)  albuterol/ipratropium for Nebulization. 3 milliLiter(s) Nebulizer once PRN Shortness of Breath and/or Wheezing  aluminum hydroxide/magnesium hydroxide/simethicone Suspension 30 milliLiter(s) Oral every 4 hours PRN Dyspepsia  melatonin 3 milliGRAM(s) Oral at bedtime PRN Insomnia  ondansetron Injectable 4 milliGRAM(s) IV Push every 8 hours PRN Nausea and/or Vomiting      I&O's Summary      PHYSICAL EXAM:  Vital Signs Last 24 Hrs  T(C): 36.8 (07 Nov 2023 10:29), Max: 37.7 (07 Nov 2023 07:07)  T(F): 98.2 (07 Nov 2023 10:29), Max: 99.8 (07 Nov 2023 07:07)  HR: 72 (07 Nov 2023 10:29) (71 - 78)  BP: 132/72 (07 Nov 2023 10:29) (126/63 - 155/64)  BP(mean): 87 (06 Nov 2023 23:09) (87 - 100)  RR: 18 (07 Nov 2023 10:29) (16 - 26)  SpO2: 95% (07 Nov 2023 10:29) (95% - 98%)    Parameters below as of 07 Nov 2023 10:29  Patient On (Oxygen Delivery Method): room air      CONSTITUTIONAL: NAD, well appearing  EYES: PERRL; conjunctiva and sclera clear  RESPIRATORY: CTABL; no wheezing  CARDIOVASCULAR: RRR, s1, s2  ABDOMEN: Nontender to palpation, normoactive bowel sounds  PSYCH: A+O to person, place, and time; affect appropriate  NEUROLOGY: CN 2-12 are intact and symmetric; no gross sensory deficits   SKIN: No rashes; no palpable lesions    LABS:                        7.8    8.75  )-----------( 192      ( 07 Nov 2023 08:32 )             23.8     11-07    135  |  103  |  103<H>  ----------------------------<  128<H>  5.0   |  13<L>  |  9.75<H>    Ca    8.9      07 Nov 2023 08:32  Phos  6.3     11-07  Mg     2.7     11-07    TPro  6.0  /  Alb  2.9<L>  /  TBili  0.1<L>  /  DBili  x   /  AST  11  /  ALT  13  /  AlkPhos  79  11-07    PT/INR - ( 06 Nov 2023 15:29 )   PT: 12.7 sec;   INR: 1.22 ratio         PTT - ( 06 Nov 2023 15:29 )  PTT:28.5 sec      Urinalysis Basic - ( 07 Nov 2023 08:32 )    Color: x / Appearance: x / SG: x / pH: x  Gluc: 128 mg/dL / Ketone: x  / Bili: x / Urobili: x   Blood: x / Protein: x / Nitrite: x   Leuk Esterase: x / RBC: x / WBC x   Sq Epi: x / Non Sq Epi: x / Bacteria: x      Venous: 11-06-23 @ 15:11 FiO2: -- Oxygen Sat% 58.3      RADIOLOGY & ADDITIONAL TESTS:  New Results Reviewed Today:   New Imaging Personally Reviewed Today:  New Electrocardiogram Personally Reviewed Today:  Prior or Outpatient Records Reviewed Today:    COMMUNICATION:  Care Discussed with Consultants/Other Providers and Details of Discussion: Discussed with ACP  Discussions with Patient/Family:  PCP Communication:

## 2023-11-07 NOTE — CONSULT NOTE ADULT - ATTENDING COMMENTS
a/w PNA- multilobar  #Jose on ckd vs ckd progression  cr uptrending from yesterday to today  ?atn component   ok to check bladder scan to r/o retention  encourage po intake  hold ivf/ hold diuretics for now and allow pt to re-equilibrate on his own  pt has expressed to his outpt nephrologist Dr Holloway and to us today that he does not want dialysis if need arises, prefers medical and conservative treatment  #met acidosis-  increase bicarb tabs from 650mg tid to 1300mg po tid  #hyperkalemia  low k diet/renal diet  lokelma as needed  # abx for pna  dose per renal fxn, gfr <10  #anemia  check iron studies/ferritin  last hb as outtpt in 9s  MIRELLA EPO [id number] [place] today

## 2023-11-07 NOTE — CONSULT NOTE ADULT - SUBJECTIVE AND OBJECTIVE BOX
[facility] DIVISION OF KIDNEY DISEASES AND HYPERTENSION -- [phone]  -- INITIAL CONSULT NOTE  --------------------------------------------------------------------------------  HPI: 83M with HTN, HLD, DM, CKD Stage 5 not on HD, MGUS, and BPH who presents to the ED complaining of SOB, wheezing, cough, and fevers/chills. Nephrology consulted for management of CKD.    On review of labs on [facility], last outpatient Scr was 5.7 on 9/12/23. Labs on admission elevated at 9.24 on 11/6 and increased to 9.75 today. Pt follows with Dr. Holloway for care of his CKD.     Pt seen and evaluated at the bedside with daughter present. Pt reports feeling short of breath, constipated, mild upper abdominal pain, and decreased urine output.     PAST HISTORY  --------------------------------------------------------------------------------  PAST MEDICAL & SURGICAL HISTORY:  Diabetes  Hypertension  Hyperlipidemia  MGUS (monoclonal gammopathy of unknown significance)  BPH (benign prostatic hyperplasia)  Renal lesion  H/O transurethral resection of prostate    FAMILY HISTORY:  Family history of coronary artery disease (Father)  Family history of breast cancer (Mother)    PAST SOCIAL HISTORY:  No history of smoking    ALLERGIES & MEDICATIONS  --------------------------------------------------------------------------------  Allergies  No Known Allergies    Intolerances    Standing Inpatient Medicationsalbuterol/ipratropium for Nebulization 3 milliLiter(s) Nebulizer every 6 hours  amLODIPine   Tablet 5 milliGRAM(s) Oral <User Schedule>  atenolol  Tablet 50 milliGRAM(s) Oral daily  cefTRIAXone   IVPB 1000 milliGRAM(s) IV Intermittent every 24 hours  dorzolamide 2%/timolol 0.5% Ophthalmic Solution 1 Drop(s) Both EYES two times a day  doxazosin 8 milliGRAM(s) Oral daily  finasteride 5 milliGRAM(s) Oral daily  lactated ringers. 250 milliLiter(s) IV Continuous <Continuous>  latanoprost 0.005% Ophthalmic Solution 1 Drop(s) Both EYES at bedtime  loratadine 10 milliGRAM(s) Oral daily  sodium bicarbonate 650 milliGRAM(s) Oral three times a day    PRN Inpatient Medicationsacetaminophen     Tablet .. 650 milliGRAM(s) Oral every 6 hours PRN  albuterol/ipratropium for Nebulization. 3 milliLiter(s) Nebulizer once PRN  aluminum hydroxide/magnesium hydroxide/simethicone Suspension 30 milliLiter(s) Oral every 4 hours PRN  melatonin 3 milliGRAM(s) Oral at bedtime PRN  ondansetron Injectable 4 milliGRAM(s) IV Push every 8 hours PRN    REVIEW OF SYSTEMS  --------------------------------------------------------------------------------  Gen: No current fevers/chills  Skin: No rashes  Head/Eyes/Ears: No HA  Respiratory: mild shortness of breath when laying flat  CV: No chest pain  GI: mild upper abdominal pain, constipation  : No dysuria, hematuria, +decreased frequency of urination  MSK: mild leg swelling  Heme: No easy bruising or bleeding  Psych: No significant depression    All other systems were reviewed and are negative, except as noted.    VITALS/PHYSICAL EXAM  --------------------------------------------------------------------------------  T(C): 36.8 (11-07-23 @ 10:29), Max: 37.7 (11-07-23 @ 07:07)  HR: 72 (11-07-23 @ 10:29) (71 - 77)  BP: 132/72 (11-07-23 @ 10:29) (126/63 - 152/73)  RR: 18 (11-07-23 @ 10:29) (16 - 26)  SpO2: 95% (11-07-23 @ 10:29) (95% - 98%)  Wt(kg): --  Height (cm): 175.3 (11-06-23 @ 11:12)  Weight (kg): 64.4 (11-06-23 @ 11:12)  BMI (kg/m2): 21 (11-06-23 @ 11:12)  BSA (m2): 1.79 (11-06-23 @ 11:12)    Physical Exam:  Gen: NAD  HEENT: MMM  Pulm: crackles in bases B/L  CV: S1S2  Abd: Soft, +BS, mild tenderness to palpation  Ext: Trace B/L LE edema  Neuro: Awake  Skin: Warm and dry    LABS/STUDIES  --------------------------------------------------------------------------------              7.8    8.75  >-----------<  192      [11-07-23 @ 08:32]              23.8     135  |  103  |  103  ----------------------------<  128      [11-07-23 @ 08:32]  5.0   |  13  |  9.75        Ca     8.9     [11-07-23 @ 08:32]      Mg     2.7     [11-07-23 @ 06:42]      Phos  6.3     [11-07-23 @ 06:42]    TPro  6.0  /  Alb  2.9  /  TBili  0.1  /  DBili  x   /  AST  11  /  ALT  13  /  AlkPhos  79  [11-07-23 @ 06:42]    Creatinine Trend:  SCr 9.75 [11-07 @ 08:32]  SCr 9.82 [11-07 @ 06:42]  SCr 9.24 [11-06 @ 15:29]

## 2023-11-07 NOTE — ED ADULT NURSE REASSESSMENT NOTE - NS ED NURSE REASSESS COMMENT FT1
0700 Report received from HUEY Sawant Pt AAOx4, NAD, resp nonlabored, skin warm/dry, resting comfortably in bed with family at bedside. Pt c/o SOB  . Pt denies headache, dizziness, chest pain, palpitations, abd pain, n/v/d, urinary symptoms, fevers, chills, weakness at this time. Pt awaiting for bed . Safety maintained

## 2023-11-07 NOTE — PATIENT PROFILE ADULT - FALL HARM RISK - CONCLUSION
Vital Signs Last 24 Hrs  T(C): 37.2 (17 Jan 2023 23:55), Max: 37.2 (17 Jan 2023 23:55)  T(F): 99 (17 Jan 2023 23:55), Max: 99 (17 Jan 2023 23:55)  HR: 73 (18 Jan 2023 04:51) (71 - 74)  BP: 118/70 (18 Jan 2023 04:51) (113/71 - 127/67)  BP(mean): --  RR: 18 (18 Jan 2023 04:51) (16 - 18)  SpO2: 98% (18 Jan 2023 04:51) (95% - 99%)    Parameters below as of 18 Jan 2023 04:51  Patient On (Oxygen Delivery Method): room air    Constitutional: Well-appearing, NAD, VSS  Head: NC/AT  Eyes: PERRL, EOMI, anicteric sclera, conjunctiva WNL  ENT: Normal Pharynx, MMM, No tonsillar exudate/erythema  Neck: Supple, Non-tender  Chest: Non-tender, no rashes  Cardio: RRR, s1/s2, no appreciable murmurs/rubs/gallops  Resp: BS CTA bilaterally, no wheezing/rhonchi/rales  Abd: Soft, Non-tender, Non-distended, no rebound/guarding/rigidity  : not examined  Rectal: not examined  MSK: moving all extremities, no motor weakness, full ROM x4  Ext: palpable distal pulses, good capillary refill, no clubbing/cyanosis/edema  Psych: nonverbal, postictal  Neuro: Arousable, moving all extreimties, nonverbal baseline, +post-ictal  Skin: Warm/Dry. No rashes. Universal Safety Interventions

## 2023-11-07 NOTE — CONSULT NOTE ADULT - PROBLEM SELECTOR RECOMMENDATION 9
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 On review of labs on [facility], last outpatient Scr was 5.7 on 9/12/23. Labs on admission elevated at 9.24 on 11/6 and increased to 9.75 today. Pt follows with Dr. Holloway for care of his CKD. Pt does not want HD and only wants conservative management of CKD. Recommend checking UA, UPCR, urine Na, and bladder scan. Recommend to hold off further IVF or diuretics at this time. Obtain ECHO. Monitor labs and urine output. Avoid nephrotoxins. Dose medications as per eGFR.

## 2023-11-07 NOTE — PROGRESS NOTE ADULT - ASSESSMENT
84 yo M with a PMH of DM2, HTN, HLD, CKD5, admitted for management of CAP and ERIC on CKD.    83M with T2DM, HTN, HLD, CKD5, admitted for management of CAP and ERIC on CKD.

## 2023-11-07 NOTE — PROGRESS NOTE ADULT - PROBLEM SELECTOR PLAN 2
Baseline Cr high 5s with GFR in 9-10. On arrival, Scr 9.24 and eGFR 5 with azotemia and metabolic acidosis   -no sign of uremia  -per previous documentation from neph, pt had opted to medical mgnt. Pt confirms that he still does not want dialysis and understands that this could lead to his death (see [facility] discussion above).   -s/p  cc  -elevated proBNP and mildly elevated and flat trop:  iso CKD and acute pna. pt is NOT in ADHF  -start sodium bicarb 650 tid; monitor bicarb

## 2023-11-07 NOTE — PROGRESS NOTE ADULT - PROBLEM SELECTOR PLAN 4
Januvia at home, a1c < 6 as outpatient  - no need to check FS inpatient -holding torsemide given clinically dry.   -c/w other meds

## 2023-11-07 NOTE — CONSULT NOTE ADULT - PROBLEM SELECTOR RECOMMENDATION 3
Pt with metabolic acidosis in the setting of advanced CKD. SCO2 low at 13 today. Goal >22. Recommend oral sodium bicarbonate tablets at 1300mg TID. Monitor bicarb.    If you have any questions, please feel free to contact me.  Haris Adame MD  Nephrology Fellow  [phone] / [employer] Teams (Preferred)  (After 4pm or on weekends, please call the on-call Fellow)

## 2023-11-07 NOTE — PROGRESS NOTE ADULT - PROBLEM SELECTOR PLAN 1
Legionella negative; stopped azithro 11/7  - c/w CTX 11/6 - . Can switch to oral regimen   - f/u antigens  - c/w duoneb prn for wheezing

## 2023-11-07 NOTE — PROGRESS NOTE ADULT - PROBLEM SELECTOR PLAN 3
-holding torsemide given clinically dry.   -c/w other meds Normocytic anemia with high RDW, no sign of acute bleed. Consideration for AOCD iso renal disease vs PINEDA  - f/u iron studies, ferritin

## 2023-11-07 NOTE — CONSULT NOTE ADULT - PROBLEM SELECTOR RECOMMENDATION 2
Pt with anemia in the setting of advanced CKD. Pt Hgb normally ~10. Hgb low today at 7.8. Pt received Aranesp on 9/18 in outpatient setting. Recommend checking iron studies and ferritin levels. Transfusion as per primary team. Will start on weekly EPO. Monitor Hgb levels.

## 2023-11-08 ENCOUNTER — NON-APPOINTMENT (OUTPATIENT)
Age: 83
End: 2023-11-08

## 2023-11-08 DIAGNOSIS — E83.39 OTHER DISORDERS OF PHOSPHORUS METABOLISM: ICD-10-CM

## 2023-11-08 LAB
ALBUMIN SERPL ELPH-MCNC: 2.8 G/DL — LOW (ref 3.3–5)
ALBUMIN SERPL ELPH-MCNC: 2.8 G/DL — LOW (ref 3.3–5)
ALP SERPL-CCNC: 88 U/L — SIGNIFICANT CHANGE UP (ref 40–120)
ALP SERPL-CCNC: 88 U/L — SIGNIFICANT CHANGE UP (ref 40–120)
ALT FLD-CCNC: 11 U/L — SIGNIFICANT CHANGE UP (ref 10–45)
ALT FLD-CCNC: 11 U/L — SIGNIFICANT CHANGE UP (ref 10–45)
ANION GAP SERPL CALC-SCNC: 18 MMOL/L — HIGH (ref 5–17)
ANION GAP SERPL CALC-SCNC: 18 MMOL/L — HIGH (ref 5–17)
AST SERPL-CCNC: 10 U/L — SIGNIFICANT CHANGE UP (ref 10–40)
AST SERPL-CCNC: 10 U/L — SIGNIFICANT CHANGE UP (ref 10–40)
BASE EXCESS BLDV CALC-SCNC: -12.6 MMOL/L — LOW (ref -2–3)
BASE EXCESS BLDV CALC-SCNC: -12.6 MMOL/L — LOW (ref -2–3)
BASOPHILS # BLD AUTO: 0.03 K/UL — SIGNIFICANT CHANGE UP (ref 0–0.2)
BASOPHILS # BLD AUTO: 0.03 K/UL — SIGNIFICANT CHANGE UP (ref 0–0.2)
BASOPHILS NFR BLD AUTO: 0.4 % — SIGNIFICANT CHANGE UP (ref 0–2)
BASOPHILS NFR BLD AUTO: 0.4 % — SIGNIFICANT CHANGE UP (ref 0–2)
BILIRUB SERPL-MCNC: 0.2 MG/DL — SIGNIFICANT CHANGE UP (ref 0.2–1.2)
BILIRUB SERPL-MCNC: 0.2 MG/DL — SIGNIFICANT CHANGE UP (ref 0.2–1.2)
BUN SERPL-MCNC: 102 MG/DL — HIGH (ref 7–23)
BUN SERPL-MCNC: 102 MG/DL — HIGH (ref 7–23)
CA-I SERPL-SCNC: 1.22 MMOL/L — SIGNIFICANT CHANGE UP (ref 1.15–1.33)
CA-I SERPL-SCNC: 1.22 MMOL/L — SIGNIFICANT CHANGE UP (ref 1.15–1.33)
CALCIUM SERPL-MCNC: 8.6 MG/DL — SIGNIFICANT CHANGE UP (ref 8.4–10.5)
CALCIUM SERPL-MCNC: 8.6 MG/DL — SIGNIFICANT CHANGE UP (ref 8.4–10.5)
CHLORIDE BLDV-SCNC: 111 MMOL/L — HIGH (ref 96–108)
CHLORIDE BLDV-SCNC: 111 MMOL/L — HIGH (ref 96–108)
CHLORIDE SERPL-SCNC: 106 MMOL/L — SIGNIFICANT CHANGE UP (ref 96–108)
CHLORIDE SERPL-SCNC: 106 MMOL/L — SIGNIFICANT CHANGE UP (ref 96–108)
CO2 BLDV-SCNC: 15 MMOL/L — LOW (ref 22–26)
CO2 BLDV-SCNC: 15 MMOL/L — LOW (ref 22–26)
CO2 SERPL-SCNC: 13 MMOL/L — LOW (ref 22–31)
CO2 SERPL-SCNC: 13 MMOL/L — LOW (ref 22–31)
CREAT SERPL-MCNC: 10.09 MG/DL — HIGH (ref 0.5–1.3)
CREAT SERPL-MCNC: 10.09 MG/DL — HIGH (ref 0.5–1.3)
EGFR: 5 ML/MIN/1.73M2 — LOW
EGFR: 5 ML/MIN/1.73M2 — LOW
EOSINOPHIL # BLD AUTO: 0.25 K/UL — SIGNIFICANT CHANGE UP (ref 0–0.5)
EOSINOPHIL # BLD AUTO: 0.25 K/UL — SIGNIFICANT CHANGE UP (ref 0–0.5)
EOSINOPHIL NFR BLD AUTO: 3.5 % — SIGNIFICANT CHANGE UP (ref 0–6)
EOSINOPHIL NFR BLD AUTO: 3.5 % — SIGNIFICANT CHANGE UP (ref 0–6)
FERRITIN SERPL-MCNC: 799 NG/ML — HIGH (ref 30–400)
FERRITIN SERPL-MCNC: 799 NG/ML — HIGH (ref 30–400)
GAS PNL BLDV: 135 MMOL/L — LOW (ref 136–145)
GAS PNL BLDV: 135 MMOL/L — LOW (ref 136–145)
GAS PNL BLDV: SIGNIFICANT CHANGE UP
GAS PNL BLDV: SIGNIFICANT CHANGE UP
GLUCOSE BLDV-MCNC: 99 MG/DL — SIGNIFICANT CHANGE UP (ref 70–99)
GLUCOSE BLDV-MCNC: 99 MG/DL — SIGNIFICANT CHANGE UP (ref 70–99)
GLUCOSE SERPL-MCNC: 104 MG/DL — HIGH (ref 70–99)
GLUCOSE SERPL-MCNC: 104 MG/DL — HIGH (ref 70–99)
HCO3 BLDV-SCNC: 14 MMOL/L — LOW (ref 22–29)
HCO3 BLDV-SCNC: 14 MMOL/L — LOW (ref 22–29)
HCT VFR BLD CALC: 22.8 % — LOW (ref 39–50)
HCT VFR BLD CALC: 22.8 % — LOW (ref 39–50)
HCT VFR BLDA CALC: 23 % — LOW (ref 39–51)
HCT VFR BLDA CALC: 23 % — LOW (ref 39–51)
HGB BLD CALC-MCNC: 7.5 G/DL — LOW (ref 12.6–17.4)
HGB BLD CALC-MCNC: 7.5 G/DL — LOW (ref 12.6–17.4)
HGB BLD-MCNC: 7.2 G/DL — LOW (ref 13–17)
HGB BLD-MCNC: 7.2 G/DL — LOW (ref 13–17)
IMM GRANULOCYTES NFR BLD AUTO: 1.1 % — HIGH (ref 0–0.9)
IMM GRANULOCYTES NFR BLD AUTO: 1.1 % — HIGH (ref 0–0.9)
IRON SATN MFR SERPL: 12 % — LOW (ref 16–55)
IRON SATN MFR SERPL: 12 % — LOW (ref 16–55)
IRON SATN MFR SERPL: 18 UG/DL — LOW (ref 45–165)
IRON SATN MFR SERPL: 18 UG/DL — LOW (ref 45–165)
LACTATE BLDV-MCNC: 0.7 MMOL/L — SIGNIFICANT CHANGE UP (ref 0.5–2)
LACTATE BLDV-MCNC: 0.7 MMOL/L — SIGNIFICANT CHANGE UP (ref 0.5–2)
LYMPHOCYTES # BLD AUTO: 0.79 K/UL — LOW (ref 1–3.3)
LYMPHOCYTES # BLD AUTO: 0.79 K/UL — LOW (ref 1–3.3)
LYMPHOCYTES # BLD AUTO: 10.9 % — LOW (ref 13–44)
LYMPHOCYTES # BLD AUTO: 10.9 % — LOW (ref 13–44)
MAGNESIUM SERPL-MCNC: 2.6 MG/DL — SIGNIFICANT CHANGE UP (ref 1.6–2.6)
MAGNESIUM SERPL-MCNC: 2.6 MG/DL — SIGNIFICANT CHANGE UP (ref 1.6–2.6)
MCHC RBC-ENTMCNC: 28.6 PG — SIGNIFICANT CHANGE UP (ref 27–34)
MCHC RBC-ENTMCNC: 28.6 PG — SIGNIFICANT CHANGE UP (ref 27–34)
MCHC RBC-ENTMCNC: 31.6 GM/DL — LOW (ref 32–36)
MCHC RBC-ENTMCNC: 31.6 GM/DL — LOW (ref 32–36)
MCV RBC AUTO: 90.5 FL — SIGNIFICANT CHANGE UP (ref 80–100)
MCV RBC AUTO: 90.5 FL — SIGNIFICANT CHANGE UP (ref 80–100)
MONOCYTES # BLD AUTO: 0.88 K/UL — SIGNIFICANT CHANGE UP (ref 0–0.9)
MONOCYTES # BLD AUTO: 0.88 K/UL — SIGNIFICANT CHANGE UP (ref 0–0.9)
MONOCYTES NFR BLD AUTO: 12.2 % — SIGNIFICANT CHANGE UP (ref 2–14)
MONOCYTES NFR BLD AUTO: 12.2 % — SIGNIFICANT CHANGE UP (ref 2–14)
NEUTROPHILS # BLD AUTO: 5.21 K/UL — SIGNIFICANT CHANGE UP (ref 1.8–7.4)
NEUTROPHILS # BLD AUTO: 5.21 K/UL — SIGNIFICANT CHANGE UP (ref 1.8–7.4)
NEUTROPHILS NFR BLD AUTO: 71.9 % — SIGNIFICANT CHANGE UP (ref 43–77)
NEUTROPHILS NFR BLD AUTO: 71.9 % — SIGNIFICANT CHANGE UP (ref 43–77)
NRBC # BLD: 0 /100 WBCS — SIGNIFICANT CHANGE UP (ref 0–0)
NRBC # BLD: 0 /100 WBCS — SIGNIFICANT CHANGE UP (ref 0–0)
PCO2 BLDV: 32 MMHG — LOW (ref 42–55)
PCO2 BLDV: 32 MMHG — LOW (ref 42–55)
PH BLDV: 7.24 — LOW (ref 7.32–7.43)
PH BLDV: 7.24 — LOW (ref 7.32–7.43)
PHOSPHATE SERPL-MCNC: 6.2 MG/DL — HIGH (ref 2.5–4.5)
PHOSPHATE SERPL-MCNC: 6.2 MG/DL — HIGH (ref 2.5–4.5)
PLATELET # BLD AUTO: 186 K/UL — SIGNIFICANT CHANGE UP (ref 150–400)
PLATELET # BLD AUTO: 186 K/UL — SIGNIFICANT CHANGE UP (ref 150–400)
PO2 BLDV: 74 MMHG — HIGH (ref 25–45)
PO2 BLDV: 74 MMHG — HIGH (ref 25–45)
POTASSIUM BLDV-SCNC: 4.5 MMOL/L — SIGNIFICANT CHANGE UP (ref 3.5–5.1)
POTASSIUM BLDV-SCNC: 4.5 MMOL/L — SIGNIFICANT CHANGE UP (ref 3.5–5.1)
POTASSIUM SERPL-MCNC: 4.6 MMOL/L — SIGNIFICANT CHANGE UP (ref 3.5–5.3)
POTASSIUM SERPL-MCNC: 4.6 MMOL/L — SIGNIFICANT CHANGE UP (ref 3.5–5.3)
POTASSIUM SERPL-SCNC: 4.6 MMOL/L — SIGNIFICANT CHANGE UP (ref 3.5–5.3)
POTASSIUM SERPL-SCNC: 4.6 MMOL/L — SIGNIFICANT CHANGE UP (ref 3.5–5.3)
PROT SERPL-MCNC: 6.1 G/DL — SIGNIFICANT CHANGE UP (ref 6–8.3)
PROT SERPL-MCNC: 6.1 G/DL — SIGNIFICANT CHANGE UP (ref 6–8.3)
RBC # BLD: 2.52 M/UL — LOW (ref 4.2–5.8)
RBC # BLD: 2.52 M/UL — LOW (ref 4.2–5.8)
RBC # FLD: 15.4 % — HIGH (ref 10.3–14.5)
RBC # FLD: 15.4 % — HIGH (ref 10.3–14.5)
SAO2 % BLDV: 92.9 % — HIGH (ref 67–88)
SAO2 % BLDV: 92.9 % — HIGH (ref 67–88)
SODIUM SERPL-SCNC: 137 MMOL/L — SIGNIFICANT CHANGE UP (ref 135–145)
SODIUM SERPL-SCNC: 137 MMOL/L — SIGNIFICANT CHANGE UP (ref 135–145)
TIBC SERPL-MCNC: 151 UG/DL — LOW (ref 220–430)
TIBC SERPL-MCNC: 151 UG/DL — LOW (ref 220–430)
UIBC SERPL-MCNC: 133 UG/DL — SIGNIFICANT CHANGE UP (ref 110–370)
UIBC SERPL-MCNC: 133 UG/DL — SIGNIFICANT CHANGE UP (ref 110–370)
WBC # BLD: 7.24 K/UL — SIGNIFICANT CHANGE UP (ref 3.8–10.5)
WBC # BLD: 7.24 K/UL — SIGNIFICANT CHANGE UP (ref 3.8–10.5)
WBC # FLD AUTO: 7.24 K/UL — SIGNIFICANT CHANGE UP (ref 3.8–10.5)
WBC # FLD AUTO: 7.24 K/UL — SIGNIFICANT CHANGE UP (ref 3.8–10.5)

## 2023-11-08 PROCEDURE — 93306 TTE W/DOPPLER COMPLETE: CPT | Mod: 26

## 2023-11-08 PROCEDURE — 99233 SBSQ HOSP IP/OBS HIGH 50: CPT | Mod: GC

## 2023-11-08 RX ORDER — CHLORHEXIDINE GLUCONATE 213 G/1000ML
1 SOLUTION TOPICAL DAILY
Refills: 0 | Status: DISCONTINUED | OUTPATIENT
Start: 2023-11-08 | End: 2023-11-10

## 2023-11-08 RX ORDER — IPRATROPIUM/ALBUTEROL SULFATE 18-103MCG
3 AEROSOL WITH ADAPTER (GRAM) INHALATION ONCE
Refills: 0 | Status: COMPLETED | OUTPATIENT
Start: 2023-11-08 | End: 2023-11-08

## 2023-11-08 RX ORDER — IRON SUCROSE 20 MG/ML
200 INJECTION, SOLUTION INTRAVENOUS ONCE
Refills: 0 | Status: COMPLETED | OUTPATIENT
Start: 2023-11-08 | End: 2023-11-08

## 2023-11-08 RX ORDER — SEVELAMER CARBONATE 2400 MG/1
800 POWDER, FOR SUSPENSION ORAL
Refills: 0 | Status: DISCONTINUED | OUTPATIENT
Start: 2023-11-08 | End: 2023-11-10

## 2023-11-08 RX ADMIN — LATANOPROST 1 DROP(S): 0.05 SOLUTION/ DROPS OPHTHALMIC; TOPICAL at 21:46

## 2023-11-08 RX ADMIN — Medication 3 MILLILITER(S): at 06:07

## 2023-11-08 RX ADMIN — ATENOLOL 50 MILLIGRAM(S): 25 TABLET ORAL at 06:07

## 2023-11-08 RX ADMIN — LORATADINE 10 MILLIGRAM(S): 10 TABLET ORAL at 13:05

## 2023-11-08 RX ADMIN — CHLORHEXIDINE GLUCONATE 1 APPLICATION(S): 213 SOLUTION TOPICAL at 21:47

## 2023-11-08 RX ADMIN — AMLODIPINE BESYLATE 5 MILLIGRAM(S): 2.5 TABLET ORAL at 21:45

## 2023-11-08 RX ADMIN — Medication 3 MILLILITER(S): at 10:58

## 2023-11-08 RX ADMIN — Medication 3 MILLILITER(S): at 10:59

## 2023-11-08 RX ADMIN — CEFTRIAXONE 100 MILLIGRAM(S): 500 INJECTION, POWDER, FOR SOLUTION INTRAMUSCULAR; INTRAVENOUS at 16:44

## 2023-11-08 RX ADMIN — SEVELAMER CARBONATE 800 MILLIGRAM(S): 2400 POWDER, FOR SUSPENSION ORAL at 17:22

## 2023-11-08 RX ADMIN — Medication 1300 MILLIGRAM(S): at 13:05

## 2023-11-08 RX ADMIN — Medication 3 MILLILITER(S): at 17:25

## 2023-11-08 RX ADMIN — Medication 100 MILLIGRAM(S): at 07:46

## 2023-11-08 RX ADMIN — DORZOLAMIDE HYDROCHLORIDE TIMOLOL MALEATE 1 DROP(S): 20; 5 SOLUTION/ DROPS OPHTHALMIC at 07:19

## 2023-11-08 RX ADMIN — SEVELAMER CARBONATE 800 MILLIGRAM(S): 2400 POWDER, FOR SUSPENSION ORAL at 13:04

## 2023-11-08 RX ADMIN — DORZOLAMIDE HYDROCHLORIDE TIMOLOL MALEATE 1 DROP(S): 20; 5 SOLUTION/ DROPS OPHTHALMIC at 17:22

## 2023-11-08 RX ADMIN — Medication 3 MILLILITER(S): at 23:19

## 2023-11-08 RX ADMIN — Medication 1300 MILLIGRAM(S): at 06:08

## 2023-11-08 RX ADMIN — Medication 1300 MILLIGRAM(S): at 21:46

## 2023-11-08 RX ADMIN — Medication 650 MILLIGRAM(S): at 02:17

## 2023-11-08 RX ADMIN — FINASTERIDE 5 MILLIGRAM(S): 5 TABLET, FILM COATED ORAL at 13:05

## 2023-11-08 RX ADMIN — AMLODIPINE BESYLATE 5 MILLIGRAM(S): 2.5 TABLET ORAL at 10:58

## 2023-11-08 RX ADMIN — Medication 8 MILLIGRAM(S): at 21:46

## 2023-11-08 RX ADMIN — IRON SUCROSE 110 MILLIGRAM(S): 20 INJECTION, SOLUTION INTRAVENOUS at 17:22

## 2023-11-08 NOTE — PROGRESS NOTE ADULT - PROBLEM SELECTOR PLAN 2
Pt with anemia in the setting of advanced CKD. Pt Hgb normally ~10. Hgb low today at 7.2. Pt received Aranesp on 9/18 in outpatient setting. Recommend checking iron studies and ferritin levels. Transfusion as per primary team. Received weekly EPO on 11/7. Monitor Hgb levels. Pt with anemia in the setting of advanced CKD. Pt Hgb normally ~10. Hgb low today at 7.2. Pt received Aranesp on 9/18 in outpatient setting. Iron studies reviewed with low iron levels and low Tsat. Recommend IV iron. Transfusion as per primary team. Received weekly EPO on 11/7. Monitor Hgb levels.

## 2023-11-08 NOTE — MEDICAL STUDENT PROGRESS NOTE(EDUCATION) - SUBJECTIVE AND OBJECTIVE BOX
82 yo M with a PMH of DM2, HTN, HLD, CKD presents with non prod cough, SOB, wheezing, low grade temp and chills x 1 week. Went to  and had negative rapid COVID/Flu, rx'd moxifloxicin and tessalon perls but came to ED d/t concern for renal failure. Reports he was making his usual amount of urine with daily torsemide. Denies ha, dizziness, cp, abd pain, n/v/d or urinary sx. On arrival, VSS. CBC w/ mild leukocytosis and anemia hgb 8, chem w/ SCr 9.2 (baseline high 5s), and elevated proBNP.     CXR w/ RUL consolidation and CT chest also showed RUL consolidation likely PNA.   Received CTX and azithro in the ED.     INTERVAL EVENTS: Patient febrile to 100.5 O/N, tylenol given and BCx drawn. He reports that his cough has worsened and has transitioned from dry to productive this AM. His abdominal distension has gotten slightly worse.     OBJECTIVE:     LABS:             7.2    7.24  )-----------( 186      ( 08 Nov 2023 06:46 )             22.8     11-08    137  |  106  |  102<H>  ----------------------------<  104<H>  4.6   |  13<L>  |  10.09<H>    Ca    8.6      08 Nov 2023 06:46  Phos  6.2     11-08  Mg     2.6     11-08    TPro  6.1  /  Alb  2.8<L>  /  TBili  0.2  /  DBili  x   /  AST  10  /  ALT  11  /  AlkPhos  88  11-08        PT/INR - ( 06 Nov 2023 15:29 )   PT: 12.7 sec;   INR: 1.22 ratio         PTT - ( 06 Nov 2023 15:29 )  PTT:28.5 sec  Urinalysis Basic - ( 08 Nov 2023 06:46 )    Color: x / Appearance: x / SG: x / pH: x  Gluc: 104 mg/dL / Ketone: x  / Bili: x / Urobili: x   Blood: x / Protein: x / Nitrite: x   Leuk Esterase: x / RBC: x / WBC x   Sq Epi: x / Non Sq Epi: x / Bacteria: x      I&O's Summary    07 Nov 2023 07:01  -  08 Nov 2023 07:00  --------------------------------------------------------  IN: 0 mL / OUT: 375 mL / NET: -375 mL      IMAGING:   ACC: [id number] EXAM:  XR CHEST PA LAT 2V   ORDERED BY:  COLTEN PEREZ     PROCEDURE DATE:  11/06/2023      INTERPRETATION:  INDICATION: Short of breath, cough and wheezing    PA and lateral chest    COMPARISON: 3/21/2023    FINDINGS:  Heart/Vascular: The heart size, mediastinum, hilum and aorta are within   normal limits for projection.  Pulmonary: Midline trachea. There is right perihilar and upper lobe   airspace infiltrate/consolidation.  Bones: There is no fracture. There is moderate dextroscoliosis with   degenerative changes of the spine.  Lines and catheter: None    Impression:    There is right perihilar and upper lobe airspace infiltrate/consolidation.    ACC: [id number] EXAM:  CT CHEST   ORDERED BY:  COLTEN PEREZ     PROCEDURE DATE:  11/06/2023          INTERPRETATION:  Clinical indication: Cough and wheezing.    Axial CT images of the chest are obtained without intravenous   administration of contrast.    Comparison is made with the prior chest CT of September 29, 2022.    Dominant 1 cm hypodense nodule within the right lobe of the thyroid gland   is without significant interval change since September 29, 2022.    No enlarged axillary, mediastinal or hilar lymph nodes.    No pericardial effusion. Heart size is enlarged. Coronary artery   calcifications. Aorta is tortuous with areas of intimal calcification.    Evaluation of the upper abdomen demonstrate cholelithiasis. A few   hypodensities within the liver with the largest measuring about 1.3 cm   are without significant interval change. Hypodense and hyperdense lesions   within the partially imaged kidneys as on the prior study.    Small right pleural effusion is new since September 29, 2022. Trace left   pleural effusion.    Evaluation of the lungs demonstrate vertically oriented patchy   consolidation within the right upper and right middle containing dense   and groundglass components new since September 29, 2022. Superimposed   areas of interlobular septal thickening.    No central endobronchial lesions.    Spinal scoliosis. Degenerative changes of the spine. 5 mm bone island   within the left sixth rib is unchanged.    IMPRESSION: Right mid to upper lung consolidation new since September 29, 2020 likely pneumonia. A 1 to 3 month follow-up noncontrast chest CT is   recommended to ensure resolution.    Small right pleural effusion.    PHYSICAL EXAM:  T(C): 37.1 (11-08-23 @ 12:34), Max: 38.1 (11-07-23 @ 22:11)  HR: 72 (11-08-23 @ 12:34) (70 - 76)  BP: 137/70 (11-08-23 @ 12:34) (135/76 - 142/72)  RR: 18 (11-08-23 @ 12:34) (18 - 20)  SpO2: 95% (11-08-23 @ 12:34) (94% - 95%)    GENERAL: Well-appearing, well-nourished, NAD  EYES: EOMI, PERRL no scleral icterus  NECK: No JVD, no nodules, no carotid bruits  LUNG: Wheezes and coarse breath sounds on the R, no accessory muscle use  HEART: RRR, no m/g/r  ABDOMEN: Soft, NT,+ bowel sounds, no bruits, abdominal distension  EXTREMITIES: trace BLE edema, 2+ DP pulses, no clubbing, no cynanosis  PSYCH: AAOx3, normal affect, normal mood  NEUROLOGY: non-focal, strength 5/5 in all extremities, sensation intact  SKIN: No rashes or lesions

## 2023-11-08 NOTE — MEDICAL STUDENT PROGRESS NOTE(EDUCATION) - PLAN 3
- Normocytic anemia with high RDW, no acute bleed  - f/u iron studies  - s/p retacrit injection yesterday

## 2023-11-08 NOTE — PROGRESS NOTE ADULT - PROBLEM SELECTOR PLAN 4
Hyperphosphatemia in the setting of acute renal failure. Phos elevated at 6.2 today. Recommend starting pt on Sevelamer. Monitor phos levels.     If you have any questions, please feel free to contact me.  Haris Adame MD  Nephrology Fellow  [id number] / [employer] Teams (Preferred)  (After 4pm or on weekends, please call the on-call Fellow)

## 2023-11-08 NOTE — PROGRESS NOTE ADULT - PROBLEM SELECTOR PLAN 7
VTE ppx: scd    GI ppx: n/i  Diet: Consistent carb, DASH, renal  GOC: DNR/DNI, will discuss with family if they are interested in palliative discussion due to impending decline 2/2 metabolic acidosis w/o HD VTE ppx: scd    GI ppx: n/i  Diet: Consistent carb, DASH, renal  GOC: DNR/DNI, will discuss with family if they are interested in palliative discussion due to impending decline 2/2 metabolic acidosis w/o HD. Pt and family interested in getting more info about home hospice

## 2023-11-08 NOTE — PROGRESS NOTE ADULT - PROBLEM SELECTOR PLAN 1
- Legionella neg, S. pneumo negative  - C/w CTX 11/6-11/10   - Duoneb prn for wheezing; tessalon pearls for cough prn - Legionella neg, S. pneumo negative  - C/w CTX 11/6-11/10   - Duoneb prn for wheezing; tessalon pearls for cough prn  - Cough worse during night and early AM, educated pt that cough may worsen as pneumonia resolves however there may also be fluid overload component as renal function continues to decline. Per nephrology, would prefer to avoid both IVF and diuretics

## 2023-11-08 NOTE — PROGRESS NOTE ADULT - PROBLEM SELECTOR PLAN 3
Normocytic anemia with high RDW, no sign of acute bleed. Consideration for AOCD iso renal disease vs PINEDA  - f/u iron studies, ferritin  - Epogen ordered; pt typically gets monthly and did not get 10/2023 dose - Normocytic anemia with high RDW, no sign of acute bleed.   - Likely due to AOCD from renal disease, also iron deficient   - IV Iron ordered  - Pt received Epo injection yesterday

## 2023-11-08 NOTE — MEDICAL STUDENT PROGRESS NOTE(EDUCATION) - PLAN 2
- Baseline Cr high 5s with current Cr 10.09  - patient does not want HD  - c/w sodium bicarbonate 1300 TID and monitor bicarb  - phosphorus 6.2 --> started sevelamer

## 2023-11-08 NOTE — PROGRESS NOTE ADULT - PROBLEM SELECTOR PLAN 4
- Torsemide has been held ISO appearing dry however now with pitting edema; consider restarting    - C/w amlodipine, atenolol (home meds) - Torsemide has been held ISO appearing dry, no pitting edema on today's exam  - C/w amlodipine, atenolol (home meds)

## 2023-11-08 NOTE — PROGRESS NOTE ADULT - SUBJECTIVE AND OBJECTIVE BOX
[facility] Division of Kidney Diseases & Hypertension  FOLLOW UP NOTE  [phone]--------------------------------------------------------------------------------    Chief Complaint: ERIC on CKD    24 hour events/subjective: Pt seen and examined at the bedside this morning with son present. Pt feels well and offers no complaints. Had a low grade fever overnight but is currently asymptomatic.    PAST HISTORY  --------------------------------------------------------------------------------  No significant changes to PMH, PSH, FHx, SHx, unless otherwise noted    ALLERGIES & MEDICATIONS  --------------------------------------------------------------------------------  Allergies  No Known Allergies    Intolerances    Standing Inpatient Medications  albuterol/ipratropium for Nebulization 3 milliLiter(s) Nebulizer every 6 hours  amLODIPine   Tablet 5 milliGRAM(s) Oral <User Schedule>  atenolol  Tablet 50 milliGRAM(s) Oral daily  cefTRIAXone   IVPB 1000 milliGRAM(s) IV Intermittent every 24 hours  dorzolamide 2%/timolol 0.5% Ophthalmic Solution 1 Drop(s) Both EYES two times a day  doxazosin 8 milliGRAM(s) Oral daily  epoetin shaniqua-epbx (RETACRIT) Injectable 67118 Unit(s) IV Push every 7 days  finasteride 5 milliGRAM(s) Oral daily  lactated ringers. 250 milliLiter(s) IV Continuous <Continuous>  latanoprost 0.005% Ophthalmic Solution 1 Drop(s) Both EYES at bedtime  loratadine 10 milliGRAM(s) Oral daily  sodium bicarbonate 1300 milliGRAM(s) Oral three times a day    PRN Inpatient Medications  acetaminophen     Tablet .. 650 milliGRAM(s) Oral every 6 hours PRN  albuterol/ipratropium for Nebulization. 3 milliLiter(s) Nebulizer once PRN  aluminum hydroxide/magnesium hydroxide/simethicone Suspension 30 milliLiter(s) Oral every 4 hours PRN  benzonatate 100 milliGRAM(s) Oral three times a day PRN  melatonin 3 milliGRAM(s) Oral at bedtime PRN  ondansetron Injectable 4 milliGRAM(s) IV Push every 8 hours PRN    REVIEW OF SYSTEMS  --------------------------------------------------------------------------------  Gen: No fevers/chills  Skin: No rashes  Head/Eyes/Ears/Mouth: No headache  Respiratory: No dyspnea, cough  CV: No chest pain  GI: No abdominal pain, diarrhea, constipation, nausea, vomiting  MSK: No joint pain, no edema  Neuro: No dizziness/lightheadedness, weakness    All other systems were reviewed and are negative, except as noted.    VITALS/PHYSICAL EXAM  --------------------------------------------------------------------------------  T(C): 37.1 (11-08-23 @ 05:30), Max: 38.1 (11-07-23 @ 22:11)  HR: 70 (11-08-23 @ 05:30) (70 - 76)  BP: 137/70 (11-08-23 @ 05:30) (132/72 - 137/70)  RR: 18 (11-08-23 @ 05:30) (18 - 20)  SpO2: 94% (11-08-23 @ 05:30) (94% - 95%)  Wt(kg): --  Height (cm): 175.3 (11-06-23 @ 11:12)  Weight (kg): 64.4 (11-06-23 @ 11:12)  BMI (kg/m2): 21 (11-06-23 @ 11:12)  BSA (m2): 1.79 (11-06-23 @ 11:12)    11-07-23 @ 07:01  -  11-08-23 @ 07:00  --------------------------------------------------------  IN: 0 mL / OUT: 375 mL / NET: -375 mL    Physical Exam:  Gen: NAD  HEENT: MMM  Pulm: crackles in bases B/L  CV: S1S2  Abd: Soft, +BS, no tenderness to palpation  Ext: Trace B/L LE edema  Neuro: Awake  Skin: Warm and dry    LABS/STUDIES  --------------------------------------------------------------------------------              7.2    7.24  >-----------<  186      [11-08-23 @ 06:46]              22.8     137  |  106  |  102  ----------------------------<  104      [11-08-23 @ 06:46]  4.6   |  13  |  10.09        Ca     8.6     [11-08-23 @ 06:46]      Mg     2.6     [11-08-23 @ 06:46]      Phos  6.2     [11-08-23 @ 06:46]    TPro  6.1  /  Alb  2.8  /  TBili  0.2  /  DBili  x   /  AST  10  /  ALT  11  /  AlkPhos  88  [11-08-23 @ 06:46]    Creatinine Trend:  SCr 10.09 [11-08 @ 06:46]  SCr 9.75 [11-07 @ 08:32]  SCr 9.82 [11-07 @ 06:42]  SCr 9.24 [11-06 @ 15:29]    Urine Creatinine 96      [11-07-23 @ 18:56]  Urine Protein 253      [11-07-23 @ 18:56]  Urine Sodium 32      [11-07-23 @ 18:56]

## 2023-11-08 NOTE — PROGRESS NOTE ADULT - PROBLEM SELECTOR PLAN 3
Pt with metabolic acidosis in the setting of advanced CKD. SCO2 low at 13 today. Continue on oral sodium bicarbonate tablets at 1300mg TID. Monitor bicarb. Goal >22.

## 2023-11-08 NOTE — PROGRESS NOTE ADULT - PROBLEM SELECTOR PLAN 1
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 On review of labs on [facility], last outpatient Scr was 5.7 on 9/12/23. Labs on admission elevated at 9.24 on 11/6 and increased to 10.09 today. Pt follows with Dr. Holloway for care of his CKD. Pt has had extensive discussions with Dr. Holloway and does not want HD, only wants conservative management of CKD. UA with 300mg of protein, SG 1.014, trace blood. UPCR is 2.6. Recommend to hold off further IVF or diuretics at this time. Obtain ECHO. Monitor labs and urine output. Avoid nephrotoxins. Dose medications as per eGFR.

## 2023-11-08 NOTE — MEDICAL STUDENT PROGRESS NOTE(EDUCATION) - ASSESSMENT
82 yo M with a PMH of DM2, HTN, HLD, CKD presents with non prod cough, SOB, wheezing, low grade temp and chills x 1 week. Went to UC and had negative rapid COVID/Flu, rx'd moxifloxicin and tessalon perls but came to ED d/t concern for renal failure. Found to have RUL pneumonia on CT on ceftriaxone.

## 2023-11-08 NOTE — PROGRESS NOTE ADULT - ASSESSMENT
83M with T2DM, HTN, HLD, CKD5, admitted for management of CAP and ERIC on CKD.  Pt on CTX for CAP, symptoms improving although febrile ON to 100.5. Pt w/ progressively worsening renal function, has made decision that he is not interested in dialysis; risks of deferring HD including death discussed with patient.  83M with T2DM, HTN, HLD, CKD5, admitted for management of CAP and ERIC on CKD.  Pt on CTX for CAP, symptoms improving although febrile ON to 100.5 and with intermittent cough. Pt w/ progressively worsening renal function, has made decision that he is not interested in dialysis; risks of deferring HD including death discussed with patient.

## 2023-11-08 NOTE — PROGRESS NOTE ADULT - PROBLEM SELECTOR PLAN 2
- Baseline Cr high 5s with GFR in 9-10. On arrival, Scr 9.24 and eGFR 5 with azotemia and metabolic acidosis   -no sign of uremia  - Per pt and daughter, since December 2022 pt has opted for conservative management for renal failure and is not interested in dialysis; he confirms this and understands that this may lead to death if patient's metabolic acidosis continues to worsen   - Elevated proBNP and mildly elevated and flat trop:  iso CKD and acute pna. Pt is NOT in ADHF, and no history of HF; although no recent echo; TTE ordered   - Increase sodium bicarb to 1300 mg TID - Baseline Cr high 5s with GFR in 9-10. On arrival, Scr 9.24 and eGFR 5 with azotemia and metabolic acidosis   - No sign of uremia  - Per pt and daughter, since December 2022 pt has opted for conservative management for renal failure and is not interested in dialysis; he confirms this and understands that this may lead to death if patient's metabolic acidosis continues to worsen   - Elevated proBNP and mildly elevated and flat trop:  iso CKD and acute pna. Pt is NOT in ADHF, and no history of HF; although no recent echo; TTE ordered   - Increase sodium bicarb to 1300 mg TID  - Nephrology following, would like to hold IVF and diuretics for now and allow pt to reequilibrate on own  - CTM Cr

## 2023-11-08 NOTE — PROGRESS NOTE ADULT - CONVERSATION DETAILS
Discussion w/ pt and two sons at bedside. Discussed that pt's shortness of breath may be multifactorial with component from PNA as well as fluid overload from worsening renal failure, and that home hospice services can be arranged during hospitalization or following discharge for home nursing services w/ symptom management prioritized (shortness of breath). Family and sons at bedside emphasize that priority is patient's comfort and quality of life.

## 2023-11-08 NOTE — PROGRESS NOTE ADULT - SUBJECTIVE AND OBJECTIVE BOX
Leann Szymanski MD  PGY1  Preferred contact via [employer] Teams      Patient is a 83y old  Male who presents with a chief complaint of pneumonia (07 Nov 2023 14:11)      SUBJECTIVE / OVERNIGHT EVENTS: Pt febrile to 100.5 ON, given tylenol and blood cultures obtained.     MEDICATIONS  (STANDING):  albuterol/ipratropium for Nebulization 3 milliLiter(s) Nebulizer every 6 hours  amLODIPine   Tablet 5 milliGRAM(s) Oral <User Schedule>  atenolol  Tablet 50 milliGRAM(s) Oral daily  cefTRIAXone   IVPB 1000 milliGRAM(s) IV Intermittent every 24 hours  dorzolamide 2%/timolol 0.5% Ophthalmic Solution 1 Drop(s) Both EYES two times a day  doxazosin 8 milliGRAM(s) Oral daily  epoetin shaniqua-epbx (RETACRIT) Injectable 56480 Unit(s) IV Push every 7 days  finasteride 5 milliGRAM(s) Oral daily  lactated ringers. 250 milliLiter(s) (75 mL/Hr) IV Continuous <Continuous>  latanoprost 0.005% Ophthalmic Solution 1 Drop(s) Both EYES at bedtime  loratadine 10 milliGRAM(s) Oral daily  sodium bicarbonate 1300 milliGRAM(s) Oral three times a day    MEDICATIONS  (PRN):  acetaminophen     Tablet .. 650 milliGRAM(s) Oral every 6 hours PRN Temp greater or equal to 38C (100.4F), Mild Pain (1 - 3)  albuterol/ipratropium for Nebulization. 3 milliLiter(s) Nebulizer once PRN Shortness of Breath and/or Wheezing  aluminum hydroxide/magnesium hydroxide/simethicone Suspension 30 milliLiter(s) Oral every 4 hours PRN Dyspepsia  melatonin 3 milliGRAM(s) Oral at bedtime PRN Insomnia  ondansetron Injectable 4 milliGRAM(s) IV Push every 8 hours PRN Nausea and/or Vomiting      I&O's Detail    07 Nov 2023 07:01  -  08 Nov 2023 07:00  --------------------------------------------------------  IN:  Total IN: 0 mL    OUT:    Voided (mL): 375 mL  Total OUT: 375 mL    Total NET: -375 mL      PHYSICAL EXAM:    VITALS:   T(C): 37.1 (11-08-23 @ 05:30), Max: 38.1 (11-07-23 @ 22:11)  HR: 70 (11-08-23 @ 05:30) (70 - 76)  BP: 137/70 (11-08-23 @ 05:30) (131/63 - 137/70)  RR: 18 (11-08-23 @ 05:30) (18 - 20)  SpO2: 94% (11-08-23 @ 05:30) (94% - 95%)    GENERAL: NAD, lying in bed comfortably  HEAD:  Atraumatic, normocephalic  EYES: EOMI, PERRLA, conjunctiva and sclera clear  ENT: Moist mucous membranes  NECK: Supple, no JVD  HEART: Regular rate and rhythm, no murmurs, rubs, or gallops  LUNGS: Unlabored respirations.  Expiratory wheezing and mild crackles in lower lung bases.   ABDOMEN: Soft, nontender, nondistended, +BS  EXTREMITIES: 2+ peripheral pulses bilaterally. 1+ pitting edema up calf. No clubbing, cyanosis.  NERVOUS SYSTEM:  A&Ox3, no focal deficits   SKIN: No rashes or lesions      LABS:                        7.2    7.24  )-----------( 186      ( 08 Nov 2023 06:46 )             22.8      11-08    137  |  106  |  102<H>  ----------------------------<  104<H>  4.6   |  13<L>  |  10.09<H>    Ca    8.6      08 Nov 2023 06:46  Phos  6.2     11-08  Mg     2.6     11-08    TPro  6.1  /  Alb  2.8<L>  /  TBili  0.2  /  DBili  x   /  AST  10  /  ALT  11  /  AlkPhos  88  11-08    PT/INR - ( 06 Nov 2023 15:29 )   PT: 12.7 sec;   INR: 1.22 ratio         PTT - ( 06 Nov 2023 15:29 )  PTT:28.5 sec      Urinalysis Basic - ( 08 Nov 2023 06:46 )    Color: x / Appearance: x / SG: x / pH: x  Gluc: 104 mg/dL / Ketone: x  / Bili: x / Urobili: x   Blood: x / Protein: x / Nitrite: x   Leuk Esterase: x / RBC: x / WBC x   Sq Epi: x / Non Sq Epi: x / Bacteria: x        RADIOLOGY & ADDITIONAL TESTS:  < from: CT Chest No Cont (11.06.23 @ 17:10) >  IMPRESSION: Right mid to upper lung consolidation new since September 29, 2020 likely pneumonia. A 1 to 3 month follow-up noncontrast chest CT is   recommended to ensure resolution.    Small right pleural effusion.    < end of copied text >  < from: Xray Chest 2 Views PA/Lat (11.06.23 @ 14:31) >    Impression:    There is right perihilar and upper lobe airspace infiltrate/consolidation.    < end of copied text >     Leann Szymanski MD  PGY1  Preferred contact via [employer] Teams      Patient is a 83y old  Male who presents with a chief complaint of pneumonia (07 Nov 2023 14:11)      SUBJECTIVE / OVERNIGHT EVENTS: Pt febrile to 100.5 ON, given tylenol and blood cultures obtained. Pt says he overall is feeling better but has an intermittent cough. Two sons at bedside.     MEDICATIONS  (STANDING):  albuterol/ipratropium for Nebulization 3 milliLiter(s) Nebulizer every 6 hours  amLODIPine   Tablet 5 milliGRAM(s) Oral <User Schedule>  atenolol  Tablet 50 milliGRAM(s) Oral daily  cefTRIAXone   IVPB 1000 milliGRAM(s) IV Intermittent every 24 hours  dorzolamide 2%/timolol 0.5% Ophthalmic Solution 1 Drop(s) Both EYES two times a day  doxazosin 8 milliGRAM(s) Oral daily  epoetin shaniqua-epbx (RETACRIT) Injectable 83088 Unit(s) IV Push every 7 days  finasteride 5 milliGRAM(s) Oral daily  lactated ringers. 250 milliLiter(s) (75 mL/Hr) IV Continuous <Continuous>  latanoprost 0.005% Ophthalmic Solution 1 Drop(s) Both EYES at bedtime  loratadine 10 milliGRAM(s) Oral daily  sodium bicarbonate 1300 milliGRAM(s) Oral three times a day    MEDICATIONS  (PRN):  acetaminophen     Tablet .. 650 milliGRAM(s) Oral every 6 hours PRN Temp greater or equal to 38C (100.4F), Mild Pain (1 - 3)  albuterol/ipratropium for Nebulization. 3 milliLiter(s) Nebulizer once PRN Shortness of Breath and/or Wheezing  aluminum hydroxide/magnesium hydroxide/simethicone Suspension 30 milliLiter(s) Oral every 4 hours PRN Dyspepsia  melatonin 3 milliGRAM(s) Oral at bedtime PRN Insomnia  ondansetron Injectable 4 milliGRAM(s) IV Push every 8 hours PRN Nausea and/or Vomiting      I&O's Detail    07 Nov 2023 07:01  -  08 Nov 2023 07:00  --------------------------------------------------------  IN:  Total IN: 0 mL    OUT:    Voided (mL): 375 mL  Total OUT: 375 mL    Total NET: -375 mL      PHYSICAL EXAM:    VITALS:   T(C): 37.1 (11-08-23 @ 05:30), Max: 38.1 (11-07-23 @ 22:11)  HR: 70 (11-08-23 @ 05:30) (70 - 76)  BP: 137/70 (11-08-23 @ 05:30) (131/63 - 137/70)  RR: 18 (11-08-23 @ 05:30) (18 - 20)  SpO2: 94% (11-08-23 @ 05:30) (94% - 95%)    GENERAL: NAD, lying in bed comfortably  HEAD:  Atraumatic, normocephalic  EYES: EOMI, PERRLA, conjunctiva and sclera clear  ENT: Moist mucous membranes  NECK: Supple, no JVD  HEART: Regular rate and rhythm, no murmurs, rubs, or gallops  LUNGS: Unlabored respirations.  Expiratory wheezing and mild crackles in lower lung bases.   ABDOMEN: Soft, nontender but noticeably distended. +BS  EXTREMITIES: 2+ peripheral pulses bilaterally. No edema. No clubbing, cyanosis.  NERVOUS SYSTEM:  A&Ox3, no focal deficits   SKIN: No rashes or lesions      LABS:                        7.2    7.24  )-----------( 186      ( 08 Nov 2023 06:46 )             22.8      11-08    137  |  106  |  102<H>  ----------------------------<  104<H>  4.6   |  13<L>  |  10.09<H>    Ca    8.6      08 Nov 2023 06:46  Phos  6.2     11-08  Mg     2.6     11-08    TPro  6.1  /  Alb  2.8<L>  /  TBili  0.2  /  DBili  x   /  AST  10  /  ALT  11  /  AlkPhos  88  11-08    PT/INR - ( 06 Nov 2023 15:29 )   PT: 12.7 sec;   INR: 1.22 ratio    PTT - ( 06 Nov 2023 15:29 )  PTT:28.5 sec      Urinalysis Basic - ( 08 Nov 2023 06:46 )    Color: x / Appearance: x / SG: x / pH: x  Gluc: 104 mg/dL / Ketone: x  / Bili: x / Urobili: x   Blood: x / Protein: x / Nitrite: x   Leuk Esterase: x / RBC: x / WBC x   Sq Epi: x / Non Sq Epi: x / Bacteria: x        RADIOLOGY & ADDITIONAL TESTS:  < from: CT Chest No Cont (11.06.23 @ 17:10) >  IMPRESSION: Right mid to upper lung consolidation new since September 29, 2020 likely pneumonia. A 1 to 3 month follow-up noncontrast chest CT is   recommended to ensure resolution.    Small right pleural effusion.    < end of copied text >  < from: Xray Chest 2 Views PA/Lat (11.06.23 @ 14:31) >    Impression:    There is right perihilar and upper lobe airspace infiltrate/consolidation.    < end of copied text >

## 2023-11-09 ENCOUNTER — TRANSCRIPTION ENCOUNTER (OUTPATIENT)
Age: 83
End: 2023-11-09

## 2023-11-09 LAB
ANION GAP SERPL CALC-SCNC: 16 MMOL/L — SIGNIFICANT CHANGE UP (ref 5–17)
ANION GAP SERPL CALC-SCNC: 16 MMOL/L — SIGNIFICANT CHANGE UP (ref 5–17)
BASE EXCESS BLDV CALC-SCNC: -11.6 MMOL/L — LOW (ref -2–3)
BASE EXCESS BLDV CALC-SCNC: -11.6 MMOL/L — LOW (ref -2–3)
BUN SERPL-MCNC: 106 MG/DL — HIGH (ref 7–23)
BUN SERPL-MCNC: 106 MG/DL — HIGH (ref 7–23)
CA-I SERPL-SCNC: 1.19 MMOL/L — SIGNIFICANT CHANGE UP (ref 1.15–1.33)
CA-I SERPL-SCNC: 1.19 MMOL/L — SIGNIFICANT CHANGE UP (ref 1.15–1.33)
CALCIUM SERPL-MCNC: 8.5 MG/DL — SIGNIFICANT CHANGE UP (ref 8.4–10.5)
CALCIUM SERPL-MCNC: 8.5 MG/DL — SIGNIFICANT CHANGE UP (ref 8.4–10.5)
CHLORIDE BLDV-SCNC: 108 MMOL/L — SIGNIFICANT CHANGE UP (ref 96–108)
CHLORIDE BLDV-SCNC: 108 MMOL/L — SIGNIFICANT CHANGE UP (ref 96–108)
CHLORIDE SERPL-SCNC: 105 MMOL/L — SIGNIFICANT CHANGE UP (ref 96–108)
CHLORIDE SERPL-SCNC: 105 MMOL/L — SIGNIFICANT CHANGE UP (ref 96–108)
CO2 BLDV-SCNC: 15 MMOL/L — LOW (ref 22–26)
CO2 BLDV-SCNC: 15 MMOL/L — LOW (ref 22–26)
CO2 SERPL-SCNC: 13 MMOL/L — LOW (ref 22–31)
CO2 SERPL-SCNC: 13 MMOL/L — LOW (ref 22–31)
CREAT SERPL-MCNC: 9.88 MG/DL — HIGH (ref 0.5–1.3)
CREAT SERPL-MCNC: 9.88 MG/DL — HIGH (ref 0.5–1.3)
EGFR: 5 ML/MIN/1.73M2 — LOW
EGFR: 5 ML/MIN/1.73M2 — LOW
GAS PNL BLDV: 134 MMOL/L — LOW (ref 136–145)
GAS PNL BLDV: 134 MMOL/L — LOW (ref 136–145)
GAS PNL BLDV: SIGNIFICANT CHANGE UP
GAS PNL BLDV: SIGNIFICANT CHANGE UP
GLUCOSE BLDV-MCNC: 105 MG/DL — HIGH (ref 70–99)
GLUCOSE BLDV-MCNC: 105 MG/DL — HIGH (ref 70–99)
GLUCOSE SERPL-MCNC: 104 MG/DL — HIGH (ref 70–99)
GLUCOSE SERPL-MCNC: 104 MG/DL — HIGH (ref 70–99)
HCO3 BLDV-SCNC: 14 MMOL/L — LOW (ref 22–29)
HCO3 BLDV-SCNC: 14 MMOL/L — LOW (ref 22–29)
HCT VFR BLD CALC: 21.8 % — LOW (ref 39–50)
HCT VFR BLD CALC: 21.8 % — LOW (ref 39–50)
HCT VFR BLDA CALC: 22 % — LOW (ref 39–51)
HCT VFR BLDA CALC: 22 % — LOW (ref 39–51)
HGB BLD CALC-MCNC: 7.3 G/DL — LOW (ref 12.6–17.4)
HGB BLD CALC-MCNC: 7.3 G/DL — LOW (ref 12.6–17.4)
HGB BLD-MCNC: 7.1 G/DL — LOW (ref 13–17)
HGB BLD-MCNC: 7.1 G/DL — LOW (ref 13–17)
LACTATE BLDV-MCNC: 0.6 MMOL/L — SIGNIFICANT CHANGE UP (ref 0.5–2)
LACTATE BLDV-MCNC: 0.6 MMOL/L — SIGNIFICANT CHANGE UP (ref 0.5–2)
MAGNESIUM SERPL-MCNC: 2.6 MG/DL — SIGNIFICANT CHANGE UP (ref 1.6–2.6)
MAGNESIUM SERPL-MCNC: 2.6 MG/DL — SIGNIFICANT CHANGE UP (ref 1.6–2.6)
MCHC RBC-ENTMCNC: 29.7 PG — SIGNIFICANT CHANGE UP (ref 27–34)
MCHC RBC-ENTMCNC: 29.7 PG — SIGNIFICANT CHANGE UP (ref 27–34)
MCHC RBC-ENTMCNC: 32.6 GM/DL — SIGNIFICANT CHANGE UP (ref 32–36)
MCHC RBC-ENTMCNC: 32.6 GM/DL — SIGNIFICANT CHANGE UP (ref 32–36)
MCV RBC AUTO: 91.2 FL — SIGNIFICANT CHANGE UP (ref 80–100)
MCV RBC AUTO: 91.2 FL — SIGNIFICANT CHANGE UP (ref 80–100)
NRBC # BLD: 0 /100 WBCS — SIGNIFICANT CHANGE UP (ref 0–0)
NRBC # BLD: 0 /100 WBCS — SIGNIFICANT CHANGE UP (ref 0–0)
PCO2 BLDV: 30 MMHG — LOW (ref 42–55)
PCO2 BLDV: 30 MMHG — LOW (ref 42–55)
PH BLDV: 7.28 — LOW (ref 7.32–7.43)
PH BLDV: 7.28 — LOW (ref 7.32–7.43)
PHOSPHATE SERPL-MCNC: 6.1 MG/DL — HIGH (ref 2.5–4.5)
PHOSPHATE SERPL-MCNC: 6.1 MG/DL — HIGH (ref 2.5–4.5)
PLATELET # BLD AUTO: 189 K/UL — SIGNIFICANT CHANGE UP (ref 150–400)
PLATELET # BLD AUTO: 189 K/UL — SIGNIFICANT CHANGE UP (ref 150–400)
PO2 BLDV: 59 MMHG — HIGH (ref 25–45)
PO2 BLDV: 59 MMHG — HIGH (ref 25–45)
POTASSIUM BLDV-SCNC: 4.3 MMOL/L — SIGNIFICANT CHANGE UP (ref 3.5–5.1)
POTASSIUM BLDV-SCNC: 4.3 MMOL/L — SIGNIFICANT CHANGE UP (ref 3.5–5.1)
POTASSIUM SERPL-MCNC: 4.2 MMOL/L — SIGNIFICANT CHANGE UP (ref 3.5–5.3)
POTASSIUM SERPL-MCNC: 4.2 MMOL/L — SIGNIFICANT CHANGE UP (ref 3.5–5.3)
POTASSIUM SERPL-SCNC: 4.2 MMOL/L — SIGNIFICANT CHANGE UP (ref 3.5–5.3)
POTASSIUM SERPL-SCNC: 4.2 MMOL/L — SIGNIFICANT CHANGE UP (ref 3.5–5.3)
RBC # BLD: 2.39 M/UL — LOW (ref 4.2–5.8)
RBC # BLD: 2.39 M/UL — LOW (ref 4.2–5.8)
RBC # FLD: 15.2 % — HIGH (ref 10.3–14.5)
RBC # FLD: 15.2 % — HIGH (ref 10.3–14.5)
SAO2 % BLDV: 90.2 % — HIGH (ref 67–88)
SAO2 % BLDV: 90.2 % — HIGH (ref 67–88)
SODIUM SERPL-SCNC: 134 MMOL/L — LOW (ref 135–145)
SODIUM SERPL-SCNC: 134 MMOL/L — LOW (ref 135–145)
WBC # BLD: 7.99 K/UL — SIGNIFICANT CHANGE UP (ref 3.8–10.5)
WBC # BLD: 7.99 K/UL — SIGNIFICANT CHANGE UP (ref 3.8–10.5)
WBC # FLD AUTO: 7.99 K/UL — SIGNIFICANT CHANGE UP (ref 3.8–10.5)
WBC # FLD AUTO: 7.99 K/UL — SIGNIFICANT CHANGE UP (ref 3.8–10.5)

## 2023-11-09 PROCEDURE — 99233 SBSQ HOSP IP/OBS HIGH 50: CPT | Mod: GC

## 2023-11-09 RX ORDER — CITRIC ACID/SODIUM CITRATE 300-500 MG
30 SOLUTION, ORAL ORAL THREE TIMES A DAY
Refills: 0 | Status: DISCONTINUED | OUTPATIENT
Start: 2023-11-09 | End: 2023-11-10

## 2023-11-09 RX ORDER — SODIUM CHLORIDE 9 MG/ML
1000 INJECTION INTRAMUSCULAR; INTRAVENOUS; SUBCUTANEOUS
Refills: 0 | Status: DISCONTINUED | OUTPATIENT
Start: 2023-11-09 | End: 2023-11-10

## 2023-11-09 RX ADMIN — SEVELAMER CARBONATE 800 MILLIGRAM(S): 2400 POWDER, FOR SUSPENSION ORAL at 18:21

## 2023-11-09 RX ADMIN — SODIUM CHLORIDE 50 MILLILITER(S): 9 INJECTION INTRAMUSCULAR; INTRAVENOUS; SUBCUTANEOUS at 14:10

## 2023-11-09 RX ADMIN — SEVELAMER CARBONATE 800 MILLIGRAM(S): 2400 POWDER, FOR SUSPENSION ORAL at 13:21

## 2023-11-09 RX ADMIN — Medication 3 MILLILITER(S): at 18:20

## 2023-11-09 RX ADMIN — AMLODIPINE BESYLATE 5 MILLIGRAM(S): 2.5 TABLET ORAL at 09:08

## 2023-11-09 RX ADMIN — Medication 3 MILLILITER(S): at 06:12

## 2023-11-09 RX ADMIN — SODIUM CHLORIDE 50 MILLILITER(S): 9 INJECTION INTRAMUSCULAR; INTRAVENOUS; SUBCUTANEOUS at 20:50

## 2023-11-09 RX ADMIN — CHLORHEXIDINE GLUCONATE 1 APPLICATION(S): 213 SOLUTION TOPICAL at 13:23

## 2023-11-09 RX ADMIN — Medication 30 MILLILITER(S): at 14:10

## 2023-11-09 RX ADMIN — SEVELAMER CARBONATE 800 MILLIGRAM(S): 2400 POWDER, FOR SUSPENSION ORAL at 09:06

## 2023-11-09 RX ADMIN — Medication 1300 MILLIGRAM(S): at 13:21

## 2023-11-09 RX ADMIN — Medication 8 MILLIGRAM(S): at 20:51

## 2023-11-09 RX ADMIN — Medication 1300 MILLIGRAM(S): at 06:12

## 2023-11-09 RX ADMIN — AMLODIPINE BESYLATE 5 MILLIGRAM(S): 2.5 TABLET ORAL at 20:51

## 2023-11-09 RX ADMIN — LATANOPROST 1 DROP(S): 0.05 SOLUTION/ DROPS OPHTHALMIC; TOPICAL at 20:52

## 2023-11-09 RX ADMIN — Medication 30 MILLILITER(S): at 20:50

## 2023-11-09 RX ADMIN — Medication 1300 MILLIGRAM(S): at 20:51

## 2023-11-09 RX ADMIN — ATENOLOL 50 MILLIGRAM(S): 25 TABLET ORAL at 06:12

## 2023-11-09 RX ADMIN — FINASTERIDE 5 MILLIGRAM(S): 5 TABLET, FILM COATED ORAL at 13:21

## 2023-11-09 RX ADMIN — CEFTRIAXONE 100 MILLIGRAM(S): 500 INJECTION, POWDER, FOR SOLUTION INTRAMUSCULAR; INTRAVENOUS at 18:20

## 2023-11-09 RX ADMIN — DORZOLAMIDE HYDROCHLORIDE TIMOLOL MALEATE 1 DROP(S): 20; 5 SOLUTION/ DROPS OPHTHALMIC at 06:13

## 2023-11-09 RX ADMIN — LORATADINE 10 MILLIGRAM(S): 10 TABLET ORAL at 13:22

## 2023-11-09 RX ADMIN — Medication 100 MILLIGRAM(S): at 20:51

## 2023-11-09 RX ADMIN — Medication 3 MILLILITER(S): at 13:22

## 2023-11-09 RX ADMIN — DORZOLAMIDE HYDROCHLORIDE TIMOLOL MALEATE 1 DROP(S): 20; 5 SOLUTION/ DROPS OPHTHALMIC at 18:21

## 2023-11-09 NOTE — MEDICAL STUDENT PROGRESS NOTE(EDUCATION) - PLAN 2
- Baseline Cr high 5s with current Cr 10.09  - patient does not want HD  - c/w sodium bicarbonate 1300 TID and monitor bicarb  - phosphorus 6.2 --> started sevelamer - Baseline Cr high 5s with current Cr 10.09 --> 9.88  - patient does not want HD  - c/w sodium bicarbonate 1300 TID and monitor bicarb  - phosphorus 6.2 --> started sevelamer - Baseline Cr high 5s with current Cr 10.09 --> 9.88  - patient does not want HD  - c/w sodium bicarbonate 1300 TID and monitor bicarb  - phosphorus 6.2, started sevelamer

## 2023-11-09 NOTE — PROGRESS NOTE ADULT - PROBLEM SELECTOR PLAN 3
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

## 2023-11-09 NOTE — DISCHARGE NOTE PROVIDER - NSDCFUSCHEDAPPT_GEN_ALL_CORE_FT
Pam Holloway  [facility]  NEPHRO [address]  Scheduled Appointment: 12/12/2023    Aura Baca  [facility]  INTMED [address] [facility]  Scheduled Appointment: 12/13/2023    [facility]  CARDIOLOGY [address] [facility]   Scheduled Appointment: 12/20/2023    Chris Agarwal  [facility]  CARDIOLOGY [address] [facility]   Scheduled Appointment: 12/20/2023     Aura Baca  [facility]  INTMED [address]  Scheduled Appointment: 11/21/2023    Jessy Solano  [facility]  NEPHRO [address]  Scheduled Appointment: 12/01/2023    Pam Holloway  [facility]  NEPHRO [address]  Scheduled Appointment: 12/12/2023    Aura Baca  [facility]  INTMED [address]  Scheduled Appointment: 12/13/2023    [facility]  CARDIOLOGY [address]   Scheduled Appointment: 12/20/2023    Chris Agarwal  [facility]  CARDIOLOGY [address]   Scheduled Appointment: 12/20/2023

## 2023-11-09 NOTE — MEDICAL STUDENT PROGRESS NOTE(EDUCATION) - ASSESSMENT
82 yo M with a PMH of DM2, HTN, HLD, CKD presents with non prod cough, SOB, wheezing, low grade temp and chills x 1 week. Went to UC and had negative rapid COVID/Flu, rx'd moxifloxicin and tessalon perls but came to ED d/t concern for renal failure. Found to have RUL pneumonia on CT on ceftriaxone.  84 yo M with a PMH of DM2, HTN, HLD, CKD presents with non productive cough, SOB, wheezing, low grade temp and chills for 1 week. He went to  and was found to be COVID and flu negative and received moxifloxacin and tessalon pearls. Reports he was making his usual amount of urine with daily torsemide. He has no headache, dizziness, chest pain, abdominal pain, nausea/vomiting/diarrhea, or pain on urination. He was found to have RUL consolidation on CXR and CT chest and worsening renal function with Cr around 9 (up from baseline of 5). On ceftriaxone for the PNA, but no fluids or diuretics per nephro.

## 2023-11-09 NOTE — MEDICAL STUDENT PROGRESS NOTE(EDUCATION) - SUBJECTIVE AND OBJECTIVE BOX
84 yo M with a PMH of DM2, HTN, HLD, CKD presents with non prod cough, SOB, wheezing, low grade temp and chills x 1 week. Went to UC and had negative rapid COVID/Flu, rx'd moxifloxicin and tessalon perls but came to ED d/t concern for renal failure. Reports he was making his usual amount of urine with daily torsemide. Denies ha, dizziness, cp, abd pain, n/v/d or urinary sx. On arrival, VSS. CBC w/ mild leukocytosis and anemia hgb 8, chem w/ SCr 9.2 (baseline high 5s), and elevated proBNP.     CXR w/ RUL consolidation and CT chest also showed RUL consolidation likely PNA.   Received CTX and azithro in the ED.     INTERVAL EVENTS: No acute events overnight.     OBJECTIVE:     LABS:  *INCOMPLETE*      IMAGING:   ACC: [id number] EXAM:  XR CHEST PA LAT 2V   ORDERED BY:  COLTEN PEREZ     PROCEDURE DATE:  11/06/2023      INTERPRETATION:  INDICATION: Short of breath, cough and wheezing    PA and lateral chest    COMPARISON: 3/21/2023    FINDINGS:  Heart/Vascular: The heart size, mediastinum, hilum and aorta are within   normal limits for projection.  Pulmonary: Midline trachea. There is right perihilar and upper lobe   airspace infiltrate/consolidation.  Bones: There is no fracture. There is moderate dextroscoliosis with   degenerative changes of the spine.  Lines and catheter: None    Impression:    There is right perihilar and upper lobe airspace infiltrate/consolidation.    ACC: [id number] EXAM:  CT CHEST   ORDERED BY:  COLTEN PEREZ     PROCEDURE DATE:  11/06/2023          INTERPRETATION:  Clinical indication: Cough and wheezing.    Axial CT images of the chest are obtained without intravenous   administration of contrast.    Comparison is made with the prior chest CT of September 29, 2022.    Dominant 1 cm hypodense nodule within the right lobe of the thyroid gland   is without significant interval change since September 29, 2022.    No enlarged axillary, mediastinal or hilar lymph nodes.    No pericardial effusion. Heart size is enlarged. Coronary artery   calcifications. Aorta is tortuous with areas of intimal calcification.    Evaluation of the upper abdomen demonstrate cholelithiasis. A few   hypodensities within the liver with the largest measuring about 1.3 cm   are without significant interval change. Hypodense and hyperdense lesions   within the partially imaged kidneys as on the prior study.    Small right pleural effusion is new since September 29, 2022. Trace left   pleural effusion.    Evaluation of the lungs demonstrate vertically oriented patchy   consolidation within the right upper and right middle containing dense   and groundglass components new since September 29, 2022. Superimposed   areas of interlobular septal thickening.    No central endobronchial lesions.    Spinal scoliosis. Degenerative changes of the spine. 5 mm bone island   within the left sixth rib is unchanged.    IMPRESSION: Right mid to upper lung consolidation new since September 29, 2020 likely pneumonia. A 1 to 3 month follow-up noncontrast chest CT is   recommended to ensure resolution.    Small right pleural effusion.      TRANSTHORACIC ECHOCARDIOGRAM REPORT  ________________________________________________________________________________                                      _______       Pt. Name:       JCARLOS PARIKH Study Date:    11/8/2023  MRN:            [id number]      YOB: 1940  Accession #:    [id number]       Age:           83 years  Account#:       [id number]    Gender:        M  Heart Rate:                     Height:        69.00 in (175.26 cm)  Rhythm:                         Weight:        142.00 lb (64.41 kg)  Blood Pressure: 137/70 mmHg     BSA/BMI:       1.79 m² / 20.97 kg/m²  ________________________________________________________________________________________  Referring Physician:    [id number] Abner Gomez  Interpreting Physician: Cassandra Silverio MD  Primary Sonographer:    Angelique Fuentes RDCS    CPT:               ECHO TTE WO CON COMP W DOPP - 09924.m  Indication(s):     Edema, unspecified - R60.9  Procedure:         Transthoracic echocardiogram with 2-D, M-mode and complete                     spectral and color flow Doppler.  Ordering Location: [facility]  [facility] Reaction:      Patient had no adverse reaction after injection of Ultrasound                     Enhancing Agent.  Study Information: Image quality for this study is adequate.    _______________________________________________________________________________________     CONCLUSIONS:      1. Left ventricular cavity is normal. Left ventricular wall thickness is normal. Left ventricular systolic function is normal with an ejection fraction of 65 % by Myers's method of disks. There are no regional wall motion abnormalities seen.   2. There is normal LV mass and concentric remodeling.   3. Normal left ventricular diastolic function, with normal filling pressure.   4. Normal right ventricular cavity size, wall thickness, and systolic function.   5. Mild to moderate mitral regurgitation.   6. Mild to moderate tricuspid regurgitation.   7. Mild to moderate aortic regurgitation.   8. Mild to moderate pulmonic regurgitation.   9. Estimated pulmonary artery systolic pressure is 58 mmHg, consistent with moderate pulmonary hypertension.  10. No pericardial effusion seen.    PHYSICAL EXAM:  T(C): 37.9 (11-09-23 @ 04:25), Max: 37.9 (11-08-23 @ 20:27)  HR: 76 (11-09-23 @ 04:25) (72 - 76)  BP: 129/65 (11-09-23 @ 04:25) (128/70 - 142/72)  RR: 18 (11-09-23 @ 04:25) (18 - 18)  SpO2: 96% (11-09-23 @ 04:25) (95% - 96%)    GENERAL: Well-appearing, well-nourished, NAD  EYES: EOMI, PERRL no scleral icterus  NECK: No JVD, no nodules, no carotid bruits  LUNG: Wheezes and coarse breath sounds on the R, no accessory muscle use  HEART: RRR, no m/g/r  ABDOMEN: Soft, NT,+ bowel sounds, no bruits, abdominal distension  EXTREMITIES: trace BLE edema, 2+ DP pulses, no clubbing, no cynanosis  PSYCH: AAOx3, normal affect, normal mood  NEUROLOGY: non-focal, strength 5/5 in all extremities, sensation intact  SKIN: No rashes or lesions 82 yo M with a PMH of DM2, HTN, HLD, CKD presents with non prod cough, SOB, wheezing, low grade temp and chills x 1 week. Went to UC and had negative rapid COVID/Flu, rx'd moxifloxicin and tessalon perls but came to ED d/t concern for renal failure. Reports he was making his usual amount of urine with daily torsemide. Denies ha, dizziness, cp, abd pain, n/v/d or urinary sx. On arrival, VSS. CBC w/ mild leukocytosis and anemia hgb 8, chem w/ SCr 9.2 (baseline high 5s), and elevated proBNP.     CXR w/ RUL consolidation and CT chest also showed RUL consolidation likely PNA.   Received CTX and azithro in the ED.     INTERVAL EVENTS: No acute events overnight. The pt is febrile to 100.2 and reported chills O/N. He still complains of nonproductive cough but feels that it has become less frequent.     OBJECTIVE:     LABS:                      7.1    7.99  )-----------( 189      ( 09 Nov 2023 06:36 )             21.8     11-09    134<L>  |  105  |  106<H>  ----------------------------<  104<H>  4.2   |  13<L>  |  9.88<H>    Ca    8.5      09 Nov 2023 06:36  Phos  6.1     11-09  Mg     2.6     11-09    TPro  6.1  /  Alb  2.8<L>  /  TBili  0.2  /  DBili  x   /  AST  10  /  ALT  11  /  AlkPhos  88  11-08      Urinalysis Basic - ( 09 Nov 2023 06:36 )    Color: x / Appearance: x / SG: x / pH: x  Gluc: 104 mg/dL / Ketone: x  / Bili: x / Urobili: x   Blood: x / Protein: x / Nitrite: x   Leuk Esterase: x / RBC: x / WBC x   Sq Epi: x / Non Sq Epi: x / Bacteria: x      I&O's Summary    08 Nov 2023 07:01  -  09 Nov 2023 07:00  --------------------------------------------------------  IN: 600 mL / OUT: 1045 mL / NET: -445 mL      IMAGING:   ACC: [id number] EXAM:  XR CHEST PA LAT 2V   ORDERED BY:  COLTEN PEREZ     PROCEDURE DATE:  11/06/2023      INTERPRETATION:  INDICATION: Short of breath, cough and wheezing    PA and lateral chest    COMPARISON: 3/21/2023    FINDINGS:  Heart/Vascular: The heart size, mediastinum, hilum and aorta are within   normal limits for projection.  Pulmonary: Midline trachea. There is right perihilar and upper lobe   airspace infiltrate/consolidation.  Bones: There is no fracture. There is moderate dextroscoliosis with   degenerative changes of the spine.  Lines and catheter: None    Impression:    There is right perihilar and upper lobe airspace infiltrate/consolidation.    ACC: [id number] EXAM:  CT CHEST   ORDERED BY:  COLTEN WEIIAS     PROCEDURE DATE:  11/06/2023          INTERPRETATION:  Clinical indication: Cough and wheezing.    Axial CT images of the chest are obtained without intravenous   administration of contrast.    Comparison is made with the prior chest CT of September 29, 2022.    Dominant 1 cm hypodense nodule within the right lobe of the thyroid gland   is without significant interval change since September 29, 2022.    No enlarged axillary, mediastinal or hilar lymph nodes.    No pericardial effusion. Heart size is enlarged. Coronary artery   calcifications. Aorta is tortuous with areas of intimal calcification.    Evaluation of the upper abdomen demonstrate cholelithiasis. A few   hypodensities within the liver with the largest measuring about 1.3 cm   are without significant interval change. Hypodense and hyperdense lesions   within the partially imaged kidneys as on the prior study.    Small right pleural effusion is new since September 29, 2022. Trace left   pleural effusion.    Evaluation of the lungs demonstrate vertically oriented patchy   consolidation within the right upper and right middle containing dense   and groundglass components new since September 29, 2022. Superimposed   areas of interlobular septal thickening.    No central endobronchial lesions.    Spinal scoliosis. Degenerative changes of the spine. 5 mm bone island   within the left sixth rib is unchanged.    IMPRESSION: Right mid to upper lung consolidation new since September 29, 2020 likely pneumonia. A 1 to 3 month follow-up noncontrast chest CT is   recommended to ensure resolution.    Small right pleural effusion.      TRANSTHORACIC ECHOCARDIOGRAM REPORT  ________________________________________________________________________________                                      _______       Pt. Name:       JCARLOS PARIKH Study Date:    11/8/2023  MRN:            [id number]      YOB: 1940  Accession #:    [id number]       Age:           83 years  Account#:       [id number]    Gender:        M  Heart Rate:                     Height:        69.00 in (175.26 cm)  Rhythm:                         Weight:        142.00 lb (64.41 kg)  Blood Pressure: 137/70 mmHg     BSA/BMI:       1.79 m² / 20.97 kg/m²  ________________________________________________________________________________________  Referring Physician:    [id number] Abner Gomez  Interpreting Physician: Cassandra Silverio MD  Primary Sonographer:    Angelique Fuentes RDCS    CPT:               ECHO TTE WO CON COMP W DOPP - 20195.m  Indication(s):     Edema, unspecified - R60.9  Procedure:         Transthoracic echocardiogram with 2-D, M-mode and complete                     spectral and color flow Doppler.  Ordering Location: [facility]  U Reaction:      Patient had no adverse reaction after injection of Ultrasound                     Enhancing Agent.  Study Information: Image quality for this study is adequate.    _______________________________________________________________________________________     CONCLUSIONS:      1. Left ventricular cavity is normal. Left ventricular wall thickness is normal. Left ventricular systolic function is normal with an ejection fraction of 65 % by Myers's method of disks. There are no regional wall motion abnormalities seen.   2. There is normal LV mass and concentric remodeling.   3. Normal left ventricular diastolic function, with normal filling pressure.   4. Normal right ventricular cavity size, wall thickness, and systolic function.   5. Mild to moderate mitral regurgitation.   6. Mild to moderate tricuspid regurgitation.   7. Mild to moderate aortic regurgitation.   8. Mild to moderate pulmonic regurgitation.   9. Estimated pulmonary artery systolic pressure is 58 mmHg, consistent with moderate pulmonary hypertension.  10. No pericardial effusion seen.    PHYSICAL EXAM:  T(C): 37.9 (11-09-23 @ 04:25), Max: 37.9 (11-08-23 @ 20:27)  HR: 76 (11-09-23 @ 04:25) (72 - 76)  BP: 129/65 (11-09-23 @ 04:25) (128/70 - 142/72)  RR: 18 (11-09-23 @ 04:25) (18 - 18)  SpO2: 96% (11-09-23 @ 04:25) (95% - 96%)    GENERAL: Well-appearing, well-nourished, NAD  EYES: EOMI, PERRL no scleral icterus  NECK: No JVD, no nodules, no carotid bruits  LUNG: Wheezes and coarse breath sounds on the R, no accessory muscle use  HEART: RRR, no m/g/r  ABDOMEN: Soft, NT,+ bowel sounds, no bruits, abdominal distension  EXTREMITIES: trace BLE edema, 2+ DP pulses, no clubbing, no cynanosis  PSYCH: AAOx3, normal affect, normal mood  NEUROLOGY: non-focal, strength 5/5 in all extremities, sensation intact  SKIN: No rashes or lesions 84 yo M with a PMH of DM2, HTN, HLD, CKD presents with non productive cough, SOB, wheezing, low grade temp and chills for 1 week. He went to  and was found to be COVID and flu negative and received moxifloxacin and tessalon pearls. Reports he was making his usual amount of urine with daily torsemide. He has no headache, dizziness, chest pain, abdominal pain, nausea/vomiting/diarrhea, or pain on urination. He was found to have RUL consolidation on CXR and CT chest and worsening renal function with Cr around 9 (up from baseline of 5).     INTERVAL EVENTS: No acute events overnight. Patient with low grade fever to 100.2 and reported chills O/N. He still complains of nonproductive cough but feels that it has become less frequent.     OBJECTIVE:     LABS:                      7.1    7.99  )-----------( 189      ( 09 Nov 2023 06:36 )             21.8     11-09    134<L>  |  105  |  106<H>  ----------------------------<  104<H>  4.2   |  13<L>  |  9.88<H>    Ca    8.5      09 Nov 2023 06:36  Phos  6.1     11-09  Mg     2.6     11-09    TPro  6.1  /  Alb  2.8<L>  /  TBili  0.2  /  DBili  x   /  AST  10  /  ALT  11  /  AlkPhos  88  11-08      Urinalysis Basic - ( 09 Nov 2023 06:36 )    Color: x / Appearance: x / SG: x / pH: x  Gluc: 104 mg/dL / Ketone: x  / Bili: x / Urobili: x   Blood: x / Protein: x / Nitrite: x   Leuk Esterase: x / RBC: x / WBC x   Sq Epi: x / Non Sq Epi: x / Bacteria: x      I&O's Summary    08 Nov 2023 07:01  -  09 Nov 2023 07:00  --------------------------------------------------------  IN: 600 mL / OUT: 1045 mL / NET: -445 mL      IMAGING:   ACC: [id number] EXAM:  XR CHEST PA LAT 2V   ORDERED BY:  COLTEN PEREZ     PROCEDURE DATE:  11/06/2023      INTERPRETATION:  INDICATION: Short of breath, cough and wheezing    PA and lateral chest    COMPARISON: 3/21/2023    FINDINGS:  Heart/Vascular: The heart size, mediastinum, hilum and aorta are within   normal limits for projection.  Pulmonary: Midline trachea. There is right perihilar and upper lobe   airspace infiltrate/consolidation.  Bones: There is no fracture. There is moderate dextroscoliosis with   degenerative changes of the spine.  Lines and catheter: None    Impression:    There is right perihilar and upper lobe airspace infiltrate/consolidation.    ACC: [id number] EXAM:  CT CHEST   ORDERED BY:  COLTEN PEREZ     PROCEDURE DATE:  11/06/2023          INTERPRETATION:  Clinical indication: Cough and wheezing.    Axial CT images of the chest are obtained without intravenous   administration of contrast.    Comparison is made with the prior chest CT of September 29, 2022.    Dominant 1 cm hypodense nodule within the right lobe of the thyroid gland   is without significant interval change since September 29, 2022.    No enlarged axillary, mediastinal or hilar lymph nodes.    No pericardial effusion. Heart size is enlarged. Coronary artery   calcifications. Aorta is tortuous with areas of intimal calcification.    Evaluation of the upper abdomen demonstrate cholelithiasis. A few   hypodensities within the liver with the largest measuring about 1.3 cm   are without significant interval change. Hypodense and hyperdense lesions   within the partially imaged kidneys as on the prior study.    Small right pleural effusion is new since September 29, 2022. Trace left   pleural effusion.    Evaluation of the lungs demonstrate vertically oriented patchy   consolidation within the right upper and right middle containing dense   and groundglass components new since September 29, 2022. Superimposed   areas of interlobular septal thickening.    No central endobronchial lesions.    Spinal scoliosis. Degenerative changes of the spine. 5 mm bone island   within the left sixth rib is unchanged.    IMPRESSION: Right mid to upper lung consolidation new since September 29, 2020 likely pneumonia. A 1 to 3 month follow-up noncontrast chest CT is   recommended to ensure resolution.    Small right pleural effusion.      TRANSTHORACIC ECHOCARDIOGRAM REPORT  ________________________________________________________________________________                                      _______       Pt. Name:       JCARLOS PARIKH Study Date:    11/8/2023  MRN:            [id number]      YOB: 1940  Accession #:    [id number]       Age:           83 years  Account#:       [id number]    Gender:        M  Heart Rate:                     Height:        69.00 in (175.26 cm)  Rhythm:                         Weight:        142.00 lb (64.41 kg)  Blood Pressure: 137/70 mmHg     BSA/BMI:       1.79 m² / 20.97 kg/m²  ________________________________________________________________________________________  Referring Physician:    [id number] Abner Gomez  Interpreting Physician: Cassandra Silverio MD  Primary Sonographer:    Angelique Fuentes RDCS    CPT:               ECHO TTE WO CON COMP W DOPP - 14132.m  Indication(s):     Edema, unspecified - R60.9  Procedure:         Transthoracic echocardiogram with 2-D, M-mode and complete                     spectral and color flow Doppler.  Ordering Location: [facility] Reaction:      Patient had no adverse reaction after injection of Ultrasound                     Enhancing Agent.  Study Information: Image quality for this study is adequate.    _______________________________________________________________________________________     CONCLUSIONS:      1. Left ventricular cavity is normal. Left ventricular wall thickness is normal. Left ventricular systolic function is normal with an ejection fraction of 65 % by Myers's method of disks. There are no regional wall motion abnormalities seen.   2. There is normal LV mass and concentric remodeling.   3. Normal left ventricular diastolic function, with normal filling pressure.   4. Normal right ventricular cavity size, wall thickness, and systolic function.   5. Mild to moderate mitral regurgitation.   6. Mild to moderate tricuspid regurgitation.   7. Mild to moderate aortic regurgitation.   8. Mild to moderate pulmonic regurgitation.   9. Estimated pulmonary artery systolic pressure is 58 mmHg, consistent with moderate pulmonary hypertension.  10. No pericardial effusion seen.    PHYSICAL EXAM:  T(C): 37.9 (11-09-23 @ 04:25), Max: 37.9 (11-08-23 @ 20:27)  HR: 76 (11-09-23 @ 04:25) (72 - 76)  BP: 129/65 (11-09-23 @ 04:25) (128/70 - 142/72)  RR: 18 (11-09-23 @ 04:25) (18 - 18)  SpO2: 96% (11-09-23 @ 04:25) (95% - 96%)    GENERAL: Well-appearing, well-nourished, NAD  EYES: EOMI, PERRL no scleral icterus  NECK: No JVD, no nodules, no carotid bruits  LUNG: B/l wheezes and scattered crackles, no accessory muscle use  HEART: RRR, no m/g/r  ABDOMEN: Soft, NT,+ bowel sounds, no bruits, mild abdominal distension  EXTREMITIES: trace BLE edema, 2+ DP pulses, no clubbing, no cyanosis  PSYCH: AAOx3, normal affect, normal mood  NEUROLOGY: non-focal, strength 5/5 in all extremities, sensation intact  SKIN: No rashes or lesions

## 2023-11-09 NOTE — PROGRESS NOTE ADULT - SUBJECTIVE AND OBJECTIVE BOX
Leann Szymanski MD  PGY1  Preferred contact via [employer] Teams      Patient is a 83y old  Male who presents with a chief complaint of pneumonia (07 Nov 2023 14:11)      SUBJECTIVE / OVERNIGHT EVENTS: No ON events, afebile, says he overall is feeling better but has an intermittent cough.     MEDICATIONS  (STANDING):  albuterol/ipratropium for Nebulization 3 milliLiter(s) Nebulizer every 6 hours  amLODIPine   Tablet 5 milliGRAM(s) Oral <User Schedule>  atenolol  Tablet 50 milliGRAM(s) Oral daily  cefTRIAXone   IVPB 1000 milliGRAM(s) IV Intermittent every 24 hours  dorzolamide 2%/timolol 0.5% Ophthalmic Solution 1 Drop(s) Both EYES two times a day  doxazosin 8 milliGRAM(s) Oral daily  epoetin shaniqua-epbx (RETACRIT) Injectable 41229 Unit(s) IV Push every 7 days  finasteride 5 milliGRAM(s) Oral daily  lactated ringers. 250 milliLiter(s) (75 mL/Hr) IV Continuous <Continuous>  latanoprost 0.005% Ophthalmic Solution 1 Drop(s) Both EYES at bedtime  loratadine 10 milliGRAM(s) Oral daily  sodium bicarbonate 1300 milliGRAM(s) Oral three times a day    MEDICATIONS  (PRN):  acetaminophen     Tablet .. 650 milliGRAM(s) Oral every 6 hours PRN Temp greater or equal to 38C (100.4F), Mild Pain (1 - 3)  albuterol/ipratropium for Nebulization. 3 milliLiter(s) Nebulizer once PRN Shortness of Breath and/or Wheezing  aluminum hydroxide/magnesium hydroxide/simethicone Suspension 30 milliLiter(s) Oral every 4 hours PRN Dyspepsia  melatonin 3 milliGRAM(s) Oral at bedtime PRN Insomnia  ondansetron Injectable 4 milliGRAM(s) IV Push every 8 hours PRN Nausea and/or Vomiting      I&O's Summary    08 Nov 2023 07:01  -  09 Nov 2023 07:00  --------------------------------------------------------  IN: 600 mL / OUT: 1045 mL / NET: -445 mL      PHYSICAL EXAM:    Vital Signs Last 24 Hrs  T(C): 37.9 (09 Nov 2023 04:25), Max: 37.9 (08 Nov 2023 20:27)  T(F): 100.2 (09 Nov 2023 04:25), Max: 100.2 (08 Nov 2023 20:27)  HR: 76 (09 Nov 2023 04:25) (72 - 76)  BP: 129/65 (09 Nov 2023 04:25) (128/70 - 142/72)  RR: 18 (09 Nov 2023 04:25) (18 - 18)  SpO2: 96% (09 Nov 2023 04:25) (95% - 96%)    Parameters below as of 09 Nov 2023 04:25  Patient On (Oxygen Delivery Method): room air      GENERAL: NAD, lying in bed comfortably  HEAD:  Atraumatic, normocephalic  EYES: EOMI, PERRLA, conjunctiva and sclera clear  ENT: Moist mucous membranes  NECK: Supple, no JVD  HEART: Regular rate and rhythm, no murmurs, rubs, or gallops  LUNGS: Unlabored respirations.  Expiratory wheezing and mild crackles in lower lung bases.   ABDOMEN: Soft, nontender but noticeably distended. +BS  EXTREMITIES: 2+ peripheral pulses bilaterally. No edema. No clubbing, cyanosis.  NERVOUS SYSTEM:  A&Ox3, no focal deficits   SKIN: No rashes or lesions      LABS:                                   7.1    7.99  )-----------( 189      ( 09 Nov 2023 06:36 )             21.8     11-09    134<L>  |  105  |  106<H>  ----------------------------<  104<H>  4.2   |  13<L>  |  9.88<H>    Ca    8.5      09 Nov 2023 06:36  Phos  6.1     11-09  Mg     2.6     11-09    TPro  6.1  /  Alb  2.8<L>  /  TBili  0.2  /  DBili  x   /  AST  10  /  ALT  11  /  AlkPhos  88  11-08      Urinalysis Basic - ( 08 Nov 2023 06:46 )    Color: x / Appearance: x / SG: x / pH: x  Gluc: 104 mg/dL / Ketone: x  / Bili: x / Urobili: x   Blood: x / Protein: x / Nitrite: x   Leuk Esterase: x / RBC: x / WBC x   Sq Epi: x / Non Sq Epi: x / Bacteria: x        RADIOLOGY & ADDITIONAL TESTS:  < from: CT Chest No Cont (11.06.23 @ 17:10) >  IMPRESSION: Right mid to upper lung consolidation new since September 29, 2020 likely pneumonia. A 1 to 3 month follow-up noncontrast chest CT is   recommended to ensure resolution.    Small right pleural effusion.    < end of copied text >  < from: Xray Chest 2 Views PA/Lat (11.06.23 @ 14:31) >    Impression:    There is right perihilar and upper lobe airspace infiltrate/consolidation.    < end of copied text >    < from: TTE W or WO Ultrasound Enhancing Agent (11.08.23 @ 15:53) >     CONCLUSIONS:      1. Left ventricular cavity is normal. Left ventricular wall thickness is normal. Left ventricular systolic function is normal with an ejection fraction of 65 % by Myers's method of disks. There are no regional wall motion abnormalities seen.   2. There is normal LV mass and concentric remodeling.   3. Normal left ventricular diastolic function, with normal filling pressure.   4. Normal right ventricular cavity size, wall thickness, and systolic function.   5. Mild to moderate mitral regurgitation.   6. Mild to moderate tricuspid regurgitation.   7. Mild to moderate aortic regurgitation.   8. Mild to moderate pulmonic regurgitation.   9. Estimated pulmonary artery systolic pressure is 58 mmHg, consistent with moderate pulmonary hypertension.  10. No pericardial effusion seen.    < end of copied text >   Leann Szymanski MD  PGY1  Preferred contact via [employer] Teams      Patient is a 83y old  Male who presents with a chief complaint of pneumonia (07 Nov 2023 14:11)      SUBJECTIVE / OVERNIGHT EVENTS: No ON events. The pt is febrile to 100.2 and reported chills O/N. He still complains of nonproductive cough but feels that it has become less frequent.    MEDICATIONS  (STANDING):  albuterol/ipratropium for Nebulization 3 milliLiter(s) Nebulizer every 6 hours  amLODIPine   Tablet 5 milliGRAM(s) Oral <User Schedule>  atenolol  Tablet 50 milliGRAM(s) Oral daily  cefTRIAXone   IVPB 1000 milliGRAM(s) IV Intermittent every 24 hours  dorzolamide 2%/timolol 0.5% Ophthalmic Solution 1 Drop(s) Both EYES two times a day  doxazosin 8 milliGRAM(s) Oral daily  epoetin shaniqua-epbx (RETACRIT) Injectable 04943 Unit(s) IV Push every 7 days  finasteride 5 milliGRAM(s) Oral daily  lactated ringers. 250 milliLiter(s) (75 mL/Hr) IV Continuous <Continuous>  latanoprost 0.005% Ophthalmic Solution 1 Drop(s) Both EYES at bedtime  loratadine 10 milliGRAM(s) Oral daily  sodium bicarbonate 1300 milliGRAM(s) Oral three times a day    MEDICATIONS  (PRN):  acetaminophen     Tablet .. 650 milliGRAM(s) Oral every 6 hours PRN Temp greater or equal to 38C (100.4F), Mild Pain (1 - 3)  albuterol/ipratropium for Nebulization. 3 milliLiter(s) Nebulizer once PRN Shortness of Breath and/or Wheezing  aluminum hydroxide/magnesium hydroxide/simethicone Suspension 30 milliLiter(s) Oral every 4 hours PRN Dyspepsia  melatonin 3 milliGRAM(s) Oral at bedtime PRN Insomnia  ondansetron Injectable 4 milliGRAM(s) IV Push every 8 hours PRN Nausea and/or Vomiting      I&O's Summary    08 Nov 2023 07:01  -  09 Nov 2023 07:00  --------------------------------------------------------  IN: 600 mL / OUT: 1045 mL / NET: -445 mL      PHYSICAL EXAM:    Vital Signs Last 24 Hrs  T(C): 37.9 (09 Nov 2023 04:25), Max: 37.9 (08 Nov 2023 20:27)  T(F): 100.2 (09 Nov 2023 04:25), Max: 100.2 (08 Nov 2023 20:27)  HR: 76 (09 Nov 2023 04:25) (72 - 76)  BP: 129/65 (09 Nov 2023 04:25) (128/70 - 142/72)  RR: 18 (09 Nov 2023 04:25) (18 - 18)  SpO2: 96% (09 Nov 2023 04:25) (95% - 96%)    Parameters below as of 09 Nov 2023 04:25  Patient On (Oxygen Delivery Method): room air      GENERAL: NAD, lying in bed comfortably  HEAD:  Atraumatic, normocephalic  EYES: EOMI, PERRLA, conjunctiva and sclera clear  ENT: Moist mucous membranes  NECK: Supple, no JVD  HEART: Regular rate and rhythm, no murmurs, rubs, or gallops  LUNGS: Unlabored respirations.  Expiratory wheezing and mild crackles in lower lung bases.   ABDOMEN: Soft, nontender but noticeably distended. +BS  EXTREMITIES: 2+ peripheral pulses bilaterally. No edema. No clubbing, cyanosis.  NERVOUS SYSTEM:  A&Ox3, no focal deficits   SKIN: No rashes or lesions      LABS:                                   7.1    7.99  )-----------( 189      ( 09 Nov 2023 06:36 )             21.8     11-09    134<L>  |  105  |  106<H>  ----------------------------<  104<H>  4.2   |  13<L>  |  9.88<H>    Ca    8.5      09 Nov 2023 06:36  Phos  6.1     11-09  Mg     2.6     11-09    TPro  6.1  /  Alb  2.8<L>  /  TBili  0.2  /  DBili  x   /  AST  10  /  ALT  11  /  AlkPhos  88  11-08      Urinalysis Basic - ( 08 Nov 2023 06:46 )    Color: x / Appearance: x / SG: x / pH: x  Gluc: 104 mg/dL / Ketone: x  / Bili: x / Urobili: x   Blood: x / Protein: x / Nitrite: x   Leuk Esterase: x / RBC: x / WBC x   Sq Epi: x / Non Sq Epi: x / Bacteria: x        RADIOLOGY & ADDITIONAL TESTS:  < from: CT Chest No Cont (11.06.23 @ 17:10) >  IMPRESSION: Right mid to upper lung consolidation new since September 29, 2020 likely pneumonia. A 1 to 3 month follow-up noncontrast chest CT is   recommended to ensure resolution.    Small right pleural effusion.    < end of copied text >  < from: Xray Chest 2 Views PA/Lat (11.06.23 @ 14:31) >    Impression:    There is right perihilar and upper lobe airspace infiltrate/consolidation.    < end of copied text >    < from: TTE W or WO Ultrasound Enhancing Agent (11.08.23 @ 15:53) >     CONCLUSIONS:      1. Left ventricular cavity is normal. Left ventricular wall thickness is normal. Left ventricular systolic function is normal with an ejection fraction of 65 % by Myers's method of disks. There are no regional wall motion abnormalities seen.   2. There is normal LV mass and concentric remodeling.   3. Normal left ventricular diastolic function, with normal filling pressure.   4. Normal right ventricular cavity size, wall thickness, and systolic function.   5. Mild to moderate mitral regurgitation.   6. Mild to moderate tricuspid regurgitation.   7. Mild to moderate aortic regurgitation.   8. Mild to moderate pulmonic regurgitation.   9. Estimated pulmonary artery systolic pressure is 58 mmHg, consistent with moderate pulmonary hypertension.  10. No pericardial effusion seen.    < end of copied text >

## 2023-11-09 NOTE — PROGRESS NOTE ADULT - PROBLEM SELECTOR PLAN 1
- Legionella neg, S. pneumo negative  - C/w CTX 11/6-11/10   - Duoneb prn for wheezing; tessalon pearls for cough prn  - Cough worse during night and early AM, educated pt that cough may worsen as pneumonia resolves however there may also be fluid overload component as renal function continues to decline. Per nephrology, would prefer to avoid both IVF and diuretics - Legionella neg, S. pneumo negative  - C/w CTX 11/6-11/12 versus switch to po medication; will do 7 day PNA course as pt has had intermittent low grade temps  - Duoneb prn for wheezing; tessalon pearls for cough prn  - Still w/ cough although now dry rather than productive

## 2023-11-09 NOTE — DISCHARGE NOTE PROVIDER - NSDCCPCAREPLAN_GEN_ALL_CORE_FT
PRINCIPAL DISCHARGE DIAGNOSIS  Diagnosis: Pneumonia  Assessment and Plan of Treatment: When you came to the hospital you had a cough, shortness of breath, and a low grade temperature; you were found to have signs of pneumonia on chest x-ray. You were treated with the appropriate antibiotics for this infection during your hospital stay and completed a full course. You are now feeling better with improved cough and no additional episodes of low-grade fever. Please return to the hospital if you have worsening shortness of breath or persistent fevers at home.      SECONDARY DISCHARGE DIAGNOSES  Diagnosis: ERIC (acute kidney injury)  Assessment and Plan of Treatment: When you came to the hospital, your creatinine was noted to be considerably elevated (~9-10) above baseline (~5). You have decided that you are not interested in pursuing dialysis and would prioritize comfort and quality of life rather than aggressive but life-extending measures. Because of your worsening renal function, your blood pH was acidic and you were started on bicarbonate pills. Please follow up closely with your nephrologist Dr. Holloway. Please be aware of symptoms you may experience from toxin buildup in the blood due to the kidneys' inability to filter such as agitation or sleepiness.     PRINCIPAL DISCHARGE DIAGNOSIS  Diagnosis: Pneumonia  Assessment and Plan of Treatment: When you came to the hospital you had a cough, shortness of breath, and a low grade temperature; you were found to have signs of pneumonia on chest x-ray. You were treated with the appropriate antibiotics for this infection during your hospital stay and have two more days left to complete the full course. Please take the oral antibiotic you are prescribed tomorrow and Sunday (through 11/12). You are now feeling better with improved cough and no additional episodes of low-grade fever. Please return to the hospital if you have worsening shortness of breath or persistent fevers at home.  MEDICATION CHANGES:  - Cefpodoxime 200 mg x 2 days (11/11-11/12)      SECONDARY DISCHARGE DIAGNOSES  Diagnosis: ERIC (acute kidney injury)  Assessment and Plan of Treatment: When you came to the hospital, your creatinine was noted to be considerably elevated (~9-10) above baseline (~5). You have decided that you are not interested in pursuing dialysis and would prioritize comfort and quality of life rather than aggressive but life-extending measures. Because of your worsening renal function, your blood pH was acidic and you were started on bicarbonate pills. You were also started on a phosphate binder to keep your phosphate levels within normal range. Please follow up closely with your nephrologist Dr. Holloway. Please be aware of symptoms you may experience from toxin buildup in the blood due to the kidneys' inability to filter such as agitation or sleepiness. Please be mindful of your phosphate intake.  MEDICATION CHANGES:  - Sevelamer 800 mg TID with meals  - Sodium bicarbonate 1300 mg three times a day  - Your allopurinol dose has been changed to 50 mg once a WEEK.     PRINCIPAL DISCHARGE DIAGNOSIS  Diagnosis: Pneumonia  Assessment and Plan of Treatment: When you came to the hospital you had a cough, shortness of breath, and a low grade temperature; you were found to have signs of pneumonia on chest x-ray. You were treated with the appropriate antibiotics for this infection during your hospital stay and have two more days left to complete the full course. Please take the oral antibiotic you are prescribed tomorrow and Sunday (through 11/12). You are now feeling better with improved cough and no additional episodes of low-grade fever. Please return to the hospital if you have worsening shortness of breath or persistent fevers at home.  MEDICATION CHANGES:  - Cefpodoxime 200 mg x 2 days (11/11-11/12)  - Use nebulizer as needed      SECONDARY DISCHARGE DIAGNOSES  Diagnosis: ERIC (acute kidney injury)  Assessment and Plan of Treatment: When you came to the hospital, your creatinine was noted to be considerably elevated (~9-10) above baseline (~5). You have decided that you are not interested in pursuing dialysis and would prioritize comfort and quality of life rather than aggressive but life-extending measures. Because of your worsening renal function, your blood pH was acidic and you were started on bicarbonate pills. You were also started on a phosphate binder to keep your phosphate levels within normal range. Please follow up closely with your nephrologist Dr. Holloway. Please be aware of symptoms you may experience from toxin buildup in the blood due to the kidneys' inability to filter such as agitation or sleepiness. Please be mindful of your phosphate intake.  MEDICATION CHANGES:  - Sevelamer 800 mg TID with meals  - Sodium bicarbonate 1300 mg three times a day  - Your allopurinol dose has been changed to 50 mg once a WEEK.

## 2023-11-09 NOTE — PROGRESS NOTE ADULT - PROBLEM SELECTOR PLAN 3
Pt with metabolic acidosis in the setting of advanced CKD. SCO2 low at 13 today. Continue on oral sodium bicarbonate tablets at 1300mg TID. Recommend adding sodium citrate @ 30ccs TID. Monitor bicarb. Goal >22.

## 2023-11-09 NOTE — DISCHARGE NOTE PROVIDER - CARE PROVIDER_API CALL
Pam Holloway  Nephrology  [address] [place] [address], [address]  [place] [address]  Phone: [phone]  Fax: [phone]  Established Patient  Follow Up Time:

## 2023-11-09 NOTE — DISCHARGE NOTE PROVIDER - NSDCFUADDAPPT_GEN_ALL_CORE_FT
VIRTUAL APPOINTMENT with Dr. Estrella on 11/14 @1:00PM (Pulmonology)    Telehealth instructions: At the time of your appointment you will receive a text/email invite for your telehealth session. Please click on the link, enter the patient's name. You will then be redirected to a virtual waiting room, where you will wait until the doctor has connected with you.

## 2023-11-09 NOTE — MEDICAL STUDENT PROGRESS NOTE(EDUCATION) - PLAN 7
- SCDs - Pt does not want HD and understands that with his worsening renal fcn it may be fatal without dialysis  - Have discussed with the patient and the family and he wants to be discharged home with recommendations for home hospice services

## 2023-11-09 NOTE — PROGRESS NOTE ADULT - PROBLEM SELECTOR PLAN 7
VTE ppx: scd    GI ppx: n/i  Diet: Consistent carb, DASH, renal  GOC: DNR/DNI, will discuss with family if they are interested in palliative discussion due to impending decline 2/2 metabolic acidosis w/o HD. Pt and family interested in getting more info about home hospice

## 2023-11-09 NOTE — PROGRESS NOTE ADULT - ASSESSMENT
83M with T2DM, HTN, HLD, CKD5, admitted for management of CAP and ERIC on CKD.  Pt on CTX for CAP, will complete course tomorrow. Pt w/ progressively worsening renal function, has made decision that he is not interested in dialysis; risks of deferring HD including death discussed with patient.  83M with T2DM, HTN, HLD, CKD5, admitted for management of CAP and ERIC on CKD.  Pt on CTX for CAP, will complete course tomorrow. Pt w/ progressively worsening renal function, has made decision that he is not interested in dialysis; risks of deferring HD including death discussed with patient. Pt prefers to go home once PNA symptoms improve/afebrile w/ possible hospice care, is deciding whether he wants to stay local versus move to [place] and establish care with doctors there (daughter is hospice nurse).

## 2023-11-09 NOTE — MEDICAL STUDENT PROGRESS NOTE(EDUCATION) - PLAN 1
- C/w ceftriazone  - legionella negative, strep pneumo negative  - c/w duonebs PRN - C/w ceftriaxone  - legionella negative, strep pneumo negative  - c/w duonebs PRN - C/w ceftriaxone for 7 day course as the patient remains febrile and symptomatic  - Can transition to oral cefpodoxime q24h upon d/c  - legionella negative, strep pneumo negative  - c/w theresa PRN

## 2023-11-09 NOTE — MEDICAL STUDENT PROGRESS NOTE(EDUCATION) - PLAN 3
- Normocytic anemia with high RDW, no acute bleed  - f/u iron studies  - s/p retacrit injection yesterday - Normocytic anemia with high RDW, no acute bleed  - f/u iron studies  - s/p retacrit injection yesterday  - Hb 7.1 today, transfuse if Hb <7 - Normocytic anemia with high RDW, no acute bleed  - f/u iron studies  - s/p retacrit injection yesterday  - Hb 7.1 today, consider transfusion if Hb <7

## 2023-11-09 NOTE — PROGRESS NOTE ADULT - PROBLEM SELECTOR PLAN 1
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 On review of labs on [facility], last outpatient Scr was 5.7 on 9/12/23. Labs on admission elevated at 9.24 on 11/6 and increased to 10.09 today. Pt follows with Dr. Holloway for care of his CKD. Pt has had extensive discussions with Dr. Holloway and does not want HD, only wants conservative management of CKD. UA with 300mg of protein, SG 1.014, trace blood. UPCR is 2.6. Recommend IV NS @ 50ccs/hr for 12 hours. Monitor labs and urine output. Avoid nephrotoxins. Dose medications as per eGFR.

## 2023-11-09 NOTE — DISCHARGE NOTE PROVIDER - NSDCMRMEDTOKEN_GEN_ALL_CORE_FT
ALLOPURINOL 100 MG TABLET: TAKE 1 TABLET BY MOUTH EVERY DAY  AMLODIPINE BESYLATE 5 MG TAB:   ATENOLOL 50 MG TABLET:   DORZOLAMIDE-TIMOLOL EYE DROPS:   DOXAZOSIN MESYLATE 8 MG TAB:   FINASTERIDE 5 MG TABLET:   JANUVIA 25 MG TABLET:   LATANOPROST 0.005% EYE DROPS:   LEVOCETIRIZINE 5 MG TABLET:   torsemide 10 mg oral tablet: 1 tab(s) orally once a day   allopurinol 100 mg oral tablet: 50 milligram(s) orally once a week Please take HALF a tablet WEEKLY  AMLODIPINE BESYLATE 5 MG TAB:   ATENOLOL 50 MG TABLET:   cefpodoxime 200 mg oral tablet: 1 tab(s) orally once a day  DORZOLAMIDE-TIMOLOL EYE DROPS:   DOXAZOSIN MESYLATE 8 MG TAB:   FINASTERIDE 5 MG TABLET:   JANUVIA 25 MG TABLET:   LATANOPROST 0.005% EYE DROPS:   LEVOCETIRIZINE 5 MG TABLET:   sevelamer carbonate 800 mg oral tablet: 1 tab(s) orally 3 times a day (with meals)  sodium bicarbonate 650 mg oral tablet: 2 tab(s) orally 3 times a day   Albuterol (Eqv-ProAir HFA) 90 mcg/inh inhalation aerosol: 2 puff(s) inhaled  allopurinol 100 mg oral tablet: 50 milligram(s) orally once a week Please take HALF a tablet WEEKLY  AMLODIPINE BESYLATE 5 MG TAB:   ATENOLOL 50 MG TABLET:   benzonatate 100 mg oral capsule: 1 cap(s) orally 3 times a day as needed for Cough  cefpodoxime 200 mg oral tablet: 1 tab(s) orally once a day  DORZOLAMIDE-TIMOLOL EYE DROPS:   DOXAZOSIN MESYLATE 8 MG TAB:   FINASTERIDE 5 MG TABLET:   JANUVIA 25 MG TABLET:   LATANOPROST 0.005% EYE DROPS:   sevelamer carbonate 800 mg oral tablet: 1 tab(s) orally 3 times a day (with meals)  sodium bicarbonate 650 mg oral tablet: 2 tab(s) orally 3 times a day   Albuterol (Eqv-ProAir HFA) 90 mcg/inh inhalation aerosol: 2 puff(s) inhaled every 6 hours as needed for  bronchospasm  allopurinol 100 mg oral tablet: 50 milligram(s) orally once a week Please take HALF a tablet WEEKLY  amLODIPine 5 mg oral tablet: 1 tab(s) orally once a day  atenolol 50 mg oral tablet: 1 tab(s) orally once a day  benzonatate 100 mg oral capsule: 1 cap(s) orally 3 times a day as needed for Cough  cefpodoxime 200 mg oral tablet: 1 tab(s) orally once a day  dorzolamide-timolol 2.23%-0.68% (2%-0.5% base) preservative-free ophthalmic solution: 1 drop(s) to each affected eye 2 times a day  doxazosin 8 mg oral tablet: 1 tab(s) orally once a day  finasteride 5 mg oral tablet: 1 tab(s) orally once a day  Januvia 25 mg oral tablet: 1 tab(s) orally once a day  latanoprost 0.005% ophthalmic solution: 1 drop(s) to each affected eye once a day (at bedtime)  sevelamer carbonate 800 mg oral tablet: 1 tab(s) orally 3 times a day (with meals)  sodium bicarbonate 650 mg oral tablet: 2 tab(s) orally 3 times a day   1 nebulizer machine with supplies: Please with Duoneb nebulized inhalation as prescribed  Albuterol (Eqv-ProAir HFA) 90 mcg/inh inhalation aerosol: 2 puff(s) inhaled every 6 hours as needed for  bronchospasm  allopurinol 100 mg oral tablet: 50 milligram(s) orally once a week Please take HALF a tablet WEEKLY  amLODIPine 5 mg oral tablet: 1 tab(s) orally once a day  atenolol 50 mg oral tablet: 1 tab(s) orally once a day  benzonatate 100 mg oral capsule: 1 cap(s) orally 3 times a day as needed for Cough  cefpodoxime 200 mg oral tablet: 1 tab(s) orally once a day  dorzolamide-timolol 2.23%-0.68% (2%-0.5% base) preservative-free ophthalmic solution: 1 drop(s) to each affected eye 2 times a day  doxazosin 8 mg oral tablet: 1 tab(s) orally once a day  finasteride 5 mg oral tablet: 1 tab(s) orally once a day  ipratropium-albuterol 0.5 mg-2.5 mg/3 mL inhalation solution: 3 milliliter(s) by nebulizer every 6 hours as needed for  shortness of breath and/or wheezing  Januvia 25 mg oral tablet: 1 tab(s) orally once a day  latanoprost 0.005% ophthalmic solution: 1 drop(s) to each affected eye once a day (at bedtime)  sevelamer carbonate 800 mg oral tablet: 1 tab(s) orally 3 times a day (with meals)  sodium bicarbonate 650 mg oral tablet: 2 tab(s) orally 3 times a day

## 2023-11-09 NOTE — PROGRESS NOTE ADULT - SUBJECTIVE AND OBJECTIVE BOX
[facility] Division of Kidney Diseases & Hypertension  FOLLOW UP NOTE  [phone]--------------------------------------------------------------------------------    Chief Complaint: ERIC on CKD    24 hour events/subjective: Pt seen and examined at the bedside this morning. Pt feels much better than yesterday. Pt only complains of decreased appetite.    PAST HISTORY  --------------------------------------------------------------------------------  No significant changes to PMH, PSH, FHx, SHx, unless otherwise noted    ALLERGIES & MEDICATIONS  --------------------------------------------------------------------------------  Allergies  No Known Allergies    Intolerances    Standing Inpatient Medications  albuterol/ipratropium for Nebulization 3 milliLiter(s) Nebulizer every 6 hours  amLODIPine   Tablet 5 milliGRAM(s) Oral <User Schedule>  atenolol  Tablet 50 milliGRAM(s) Oral daily  cefTRIAXone   IVPB 1000 milliGRAM(s) IV Intermittent every 24 hours  chlorhexidine 4% Liquid 1 Application(s) Topical daily  dorzolamide 2%/timolol 0.5% Ophthalmic Solution 1 Drop(s) Both EYES two times a day  doxazosin 8 milliGRAM(s) Oral daily  finasteride 5 milliGRAM(s) Oral daily  lactated ringers. 250 milliLiter(s) IV Continuous <Continuous>  latanoprost 0.005% Ophthalmic Solution 1 Drop(s) Both EYES at bedtime  loratadine 10 milliGRAM(s) Oral daily  sevelamer carbonate 800 milliGRAM(s) Oral three times a day with meals  sodium bicarbonate 1300 milliGRAM(s) Oral three times a day    PRN Inpatient Medications  acetaminophen     Tablet .. 650 milliGRAM(s) Oral every 6 hours PRN  aluminum hydroxide/magnesium hydroxide/simethicone Suspension 30 milliLiter(s) Oral every 4 hours PRN  benzonatate 100 milliGRAM(s) Oral three times a day PRN  melatonin 3 milliGRAM(s) Oral at bedtime PRN  ondansetron Injectable 4 milliGRAM(s) IV Push every 8 hours PRN    REVIEW OF SYSTEMS  --------------------------------------------------------------------------------  Gen: No fevers/chills  Skin: No rashes  Head/Eyes/Ears/Mouth: No headache  Respiratory: No dyspnea, cough  CV: No chest pain  GI: No abdominal pain, diarrhea, constipation, nausea, vomiting, +decreased appetite  MSK: No joint pain, no edema  Neuro: No dizziness/lightheadedness, weakness    All other systems were reviewed and are negative, except as noted.    VITALS/PHYSICAL EXAM  --------------------------------------------------------------------------------  T(C): 37.9 (11-09-23 @ 04:25), Max: 37.9 (11-08-23 @ 20:27)  HR: 76 (11-09-23 @ 04:25) (72 - 76)  BP: 129/65 (11-09-23 @ 04:25) (128/70 - 142/72)  RR: 18 (11-09-23 @ 04:25) (18 - 18)  SpO2: 96% (11-09-23 @ 04:25) (95% - 96%)  Wt(kg): --    11-08-23 @ 07:01  -  11-09-23 @ 07:00  --------------------------------------------------------  IN: 600 mL / OUT: 1045 mL / NET: -445 mL    Physical Exam:  Gen: NAD  HEENT: MMM  Pulm: mild crackles in bases B/L  CV: S1S2  Abd: Soft, +BS, no tenderness to palpation  Ext: Trace B/L LE edema  Neuro: Awake  Skin: Warm and dry    LABS/STUDIES  --------------------------------------------------------------------------------              7.1    7.99  >-----------<  189      [11-09-23 @ 06:36]              21.8     134  |  105  |  106  ----------------------------<  104      [11-09-23 @ 06:36]  4.2   |  13  |  9.88        Ca     8.5     [11-09-23 @ 06:36]      Mg     2.6     [11-09-23 @ 06:36]      Phos  6.1     [11-09-23 @ 06:36]    TPro  6.1  /  Alb  2.8  /  TBili  0.2  /  DBili  x   /  AST  10  /  ALT  11  /  AlkPhos  88  [11-08-23 @ 06:46]    Creatinine Trend:  SCr 9.88 [11-09 @ 06:36]  SCr 10.09 [11-08 @ 06:46]  SCr 9.75 [11-07 @ 08:32]  SCr 9.82 [11-07 @ 06:42]  SCr 9.24 [11-06 @ 15:29]    Urine Creatinine 96      [11-07-23 @ 18:56]  Urine Protein 253      [11-07-23 @ 18:56]  Urine Sodium 32      [11-07-23 @ 18:56]    Iron 18, TIBC 151, %sat 12      [11-08-23 @ 06:46]  Ferritin 799      [11-08-23 @ 06:46]

## 2023-11-09 NOTE — DISCHARGE NOTE PROVIDER - HOSPITAL COURSE
Pt is an 83 yo M with a PMH of DM2, HTN, HLD, CKD who presented on 11/6 with a nonproductive cough, SOB, wheezing, low grade temp and chills x 1 week. He had gone to urgent care previously and had negative rapid COVID/Flu, received moxifloxicin and tessalon pearls but came to ED d/t concern for worsening symptoms ISO his renal failure.     On arrival, his vitals were stable. In ED he was given CTX and Azithromycin and was noted to have Cr 9.2 (baseline  high 5s), elevated Pro-BNP, leukocytosis, and anemia.   Pt was treated for CAP and also evaluated by nephrology due to his ERIC on CKD. Regarding the CAP, Azithromycin was given on 11/6 only and CTX was continued from 11/6-    Patient had intermittent low grade fevers (100.2-100.5), productive cough which was symptomatically managed.     Regarding the renal failure, per discussion with patient and family, pt has decided months ago that he is not interested in pursuing HD and would rather manage renal failure conservatively. Patient was educated about the risks of not pursuing dialysis as well as the adverse effects he may experience from uremia which will likely occur as renal function continues to worsen. Nephrology recommended observing the patient off of both IV fluids and diuretics due to his tenuous renal state; patient appeared overloaded during hospitalization but still produced adequate urine.     At time of discharge, pt is medically optimized for return home, having completed full antibiotic course, has remained afebrile for 24 hours w/ improved cough and shortness of breath. Him and  his family are interested in arranging hospice care but are still deciding whether he will remain in the [place] area or move to [place] to live with his daughter; hospice resources provided to aid in this coordination.    Pt is an 85 yo M with a PMH of DM2, HTN, HLD, CKD who presented on 11/6 with a nonproductive cough, SOB, wheezing, low grade temp and chills x 1 week. He had gone to urgent care previously and had negative rapid COVID/Flu, received moxifloxicin and tessalon pearls but came to ED d/t concern for worsening symptoms ISO his renal failure.     On arrival, his vitals were stable. In ED he was given CTX and Azithromycin and was noted to have Cr 9.2 (baseline  high 5s), elevated Pro-BNP, leukocytosis, and anemia.   Pt was treated for CAP and also evaluated by nephrology due to his ERIC on CKD. Regarding the CAP, Azithromycin was given on 11/6 only and CTX was continued from 11/6-11/10 and then switched to po Cefpodoxime for full 7-day course of PNA treatment.     Patient had intermittent low grade fevers (100.2-100.5), productive cough which was symptomatically managed.     Regarding the renal failure, per discussion with patient and family, pt has decided months ago that he is not interested in pursuing HD and would rather manage renal failure conservatively. Patient was educated about the risks of not pursuing dialysis as well as the adverse effects he may experience from uremia which will likely occur as renal function continues to worsen. Nephrology recommended observing the patient off of both IV fluids and diuretics due to his tenuous renal state; patient appeared overloaded during hospitalization but still produced adequate urine.     At time of discharge, pt is medically optimized for return home, afebrile for >24 hours with plans to complete full antibiotic course through 11/12, also w/ improved cough and shortness of breath. Him and  his family are interested in arranging hospice care but are still deciding whether he will remain in the [place] area or move to [place] to live with his daughter; [facility] services have been improved in meantime.     MEDICATION CHANGES:  - Cefpodoxime 200 mg daily x 2 days (11/11-11/12)  - Sodium bicarbonate 1300 mg three times/day  - Sevelamer 800 mg three times/day w/ meals   - Allopurinol 50 mg once weekly Pt is an 83 yo M with a PMH of DM2, HTN, HLD, CKD who presented on 11/6 with a nonproductive cough, SOB, wheezing, low grade temp and chills x 1 week. He had gone to urgent care previously and had negative rapid COVID/Flu, received moxifloxicin and tessalon pearls but came to ED d/t concern for worsening symptoms ISO his renal failure.     On arrival, his vitals were stable. In ED he was given CTX and Azithromycin and was noted to have Cr 9.2 (baseline  high 5s), elevated Pro-BNP, leukocytosis, and anemia.   Pt was treated for CAP and also evaluated by nephrology due to his ERIC on CKD. Regarding the CAP, Azithromycin was given on 11/6 only and CTX was continued from 11/6-11/10 and then switched to po Cefpodoxime for full 7-day course of PNA treatment.     Patient had intermittent low grade fevers (100.2-100.5), productive cough which was symptomatically managed.     Regarding the renal failure, per discussion with patient and family, pt has decided months ago that he is not interested in pursuing HD and would rather manage renal failure conservatively. Patient was educated about the risks of not pursuing dialysis as well as the adverse effects he may experience from uremia which will likely occur as renal function continues to worsen. Nephrology recommended observing the patient off of both IV fluids and diuretics due to his tenuous renal state; patient appeared overloaded during hospitalization but still produced adequate urine.     At time of discharge, pt is medically optimized for return home, afebrile for >24 hours with plans to complete full antibiotic course through 11/12, also w/ improved cough and shortness of breath. Him and  his family are interested in arranging hospice care but are still deciding whether he will remain in the [place] area or move to [place] to live with his daughter; [facility] services have been improved in meantime.     In addition, patient is considered a STAR patient due to admission for pneumonia. [facility] have been contacted in order to establish telehealth visit for him 1 week following discharge. This note will be updated reflecting the appt time and provider name once they are available.     MEDICATION CHANGES:  - Cefpodoxime 200 mg daily x 2 days (11/11-11/12)  - Sodium bicarbonate 1300 mg three times/day  - Sevelamer 800 mg three times/day w/ meals   - Allopurinol 50 mg once weekly Pt is an 85 yo M with a PMH of DM2, HTN, HLD, CKD who presented on 11/6 with a nonproductive cough, SOB, wheezing, low grade temp and chills x 1 week. He had gone to urgent care previously and had negative rapid COVID/Flu, received moxifloxicin and tessalon pearls but came to ED d/t concern for worsening symptoms ISO his renal failure.     On arrival, his vitals were stable. In ED he was given CTX and Azithromycin and was noted to have Cr 9.2 (baseline  high 5s), elevated Pro-BNP, leukocytosis, and anemia.   Pt was treated for CAP and also evaluated by nephrology due to his ERIC on CKD. Regarding the CAP, Azithromycin was given on 11/6 only and CTX was continued from 11/6-11/10 and then switched to po Cefpodoxime for full 7-day course of PNA treatment.     Patient had intermittent low grade fevers (100.2-100.5), productive cough which was symptomatically managed.     Regarding the renal failure, per discussion with patient and family, pt has decided months ago that he is not interested in pursuing HD and would rather manage renal failure conservatively. Patient was educated about the risks of not pursuing dialysis as well as the adverse effects he may experience from uremia which will likely occur as renal function continues to worsen. Nephrology recommended observing the patient off of both IV fluids and diuretics due to his tenuous renal state; patient appeared overloaded during hospitalization but still produced adequate urine.     At time of discharge, pt is medically optimized for return home, afebrile for >24 hours with plans to complete full antibiotic course through 11/12, also w/ improved cough and shortness of breath. Him and  his family are interested in arranging hospice care but are still deciding whether he will remain in the [place] area or move to [place] to live with his daughter; [facility] services have been improved in meantime.     In addition, patient is considered a STAR patient due to admission for pneumonia. [facility] Services have been contacted in order to establish telehealth visit for him 1 week following discharge. This appointment will be with Dr. Estrella on 11/14 at 1:00PM. This was communicated to the patient and family.     MEDICATION CHANGES:  - Cefpodoxime 200 mg daily x 2 days (11/11-11/12)  - Sodium bicarbonate 1300 mg three times/day  - Sevelamer 800 mg three times/day w/ meals   - Allopurinol 50 mg once weekly

## 2023-11-09 NOTE — PROGRESS NOTE ADULT - PROBLEM SELECTOR PLAN 4
- Torsemide has been held ISO appearing dry, no pitting edema on today's exam  - C/w amlodipine, atenolol (home meds)

## 2023-11-09 NOTE — PROGRESS NOTE ADULT - PROBLEM SELECTOR PLAN 2
- Baseline Cr high 5s with GFR in 9-10; currently Cr 9.88, eGFR, still w/ metabolic acidosis   - No sign of uremia despite   - Per pt and daughter, since December 2022 pt has opted for conservative management for renal failure and is not interested in dialysis; he confirms this and understands that this may lead to death if patient's metabolic acidosis continues to worsen   - TTE w/ EF 65%, no concern for ADHF exacerbation   - C/w sodium bicarb to 1300 mg TID  - Nephrology following, would like to hold IVF and diuretics for now and allow pt to reequilibrate on own

## 2023-11-09 NOTE — PROGRESS NOTE ADULT - PROBLEM SELECTOR PLAN 4
Hyperphosphatemia in the setting of acute renal failure. Phos elevated at 6.1 today. Continue on Sevelamer 800mg TID. Monitor phos levels.     If you have any questions, please feel free to contact me.  Haris Adame MD  Nephrology Fellow  [id number] / [employer] Teams (Preferred)  (After 4pm or on weekends, please call the on-call Fellow)

## 2023-11-09 NOTE — DISCHARGE NOTE PROVIDER - NSFOLLOWUPCLINICS_GEN_ALL_ED_FT
[facility] Pulmonolgy and Sleep Medicine  Pulmonology  [address] [place] [address], [place] [address]  Phone: [phone]  Fax:

## 2023-11-10 ENCOUNTER — TRANSCRIPTION ENCOUNTER (OUTPATIENT)
Age: 83
End: 2023-11-10

## 2023-11-10 VITALS
OXYGEN SATURATION: 94 % | SYSTOLIC BLOOD PRESSURE: 142 MMHG | DIASTOLIC BLOOD PRESSURE: 64 MMHG | RESPIRATION RATE: 18 BRPM | HEART RATE: 75 BPM | TEMPERATURE: 98 F

## 2023-11-10 LAB
ANION GAP SERPL CALC-SCNC: 17 MMOL/L — SIGNIFICANT CHANGE UP (ref 5–17)
ANION GAP SERPL CALC-SCNC: 17 MMOL/L — SIGNIFICANT CHANGE UP (ref 5–17)
BASE EXCESS BLDV CALC-SCNC: -8.4 MMOL/L — LOW (ref -2–3)
BASE EXCESS BLDV CALC-SCNC: -8.4 MMOL/L — LOW (ref -2–3)
BUN SERPL-MCNC: 104 MG/DL — HIGH (ref 7–23)
BUN SERPL-MCNC: 104 MG/DL — HIGH (ref 7–23)
CA-I SERPL-SCNC: 1.16 MMOL/L — SIGNIFICANT CHANGE UP (ref 1.15–1.33)
CA-I SERPL-SCNC: 1.16 MMOL/L — SIGNIFICANT CHANGE UP (ref 1.15–1.33)
CALCIUM SERPL-MCNC: 8.5 MG/DL — SIGNIFICANT CHANGE UP (ref 8.4–10.5)
CALCIUM SERPL-MCNC: 8.5 MG/DL — SIGNIFICANT CHANGE UP (ref 8.4–10.5)
CHLORIDE BLDV-SCNC: 110 MMOL/L — HIGH (ref 96–108)
CHLORIDE BLDV-SCNC: 110 MMOL/L — HIGH (ref 96–108)
CHLORIDE SERPL-SCNC: 106 MMOL/L — SIGNIFICANT CHANGE UP (ref 96–108)
CHLORIDE SERPL-SCNC: 106 MMOL/L — SIGNIFICANT CHANGE UP (ref 96–108)
CO2 BLDV-SCNC: 18 MMOL/L — LOW (ref 22–26)
CO2 BLDV-SCNC: 18 MMOL/L — LOW (ref 22–26)
CO2 SERPL-SCNC: 16 MMOL/L — LOW (ref 22–31)
CO2 SERPL-SCNC: 16 MMOL/L — LOW (ref 22–31)
CREAT SERPL-MCNC: 9.42 MG/DL — HIGH (ref 0.5–1.3)
CREAT SERPL-MCNC: 9.42 MG/DL — HIGH (ref 0.5–1.3)
EGFR: 5 ML/MIN/1.73M2 — LOW
EGFR: 5 ML/MIN/1.73M2 — LOW
GAS PNL BLDV: 136 MMOL/L — SIGNIFICANT CHANGE UP (ref 136–145)
GAS PNL BLDV: 136 MMOL/L — SIGNIFICANT CHANGE UP (ref 136–145)
GAS PNL BLDV: SIGNIFICANT CHANGE UP
GLUCOSE BLDV-MCNC: 104 MG/DL — HIGH (ref 70–99)
GLUCOSE BLDV-MCNC: 104 MG/DL — HIGH (ref 70–99)
GLUCOSE SERPL-MCNC: 100 MG/DL — HIGH (ref 70–99)
GLUCOSE SERPL-MCNC: 100 MG/DL — HIGH (ref 70–99)
HCO3 BLDV-SCNC: 17 MMOL/L — LOW (ref 22–29)
HCO3 BLDV-SCNC: 17 MMOL/L — LOW (ref 22–29)
HCT VFR BLD CALC: 21.3 % — LOW (ref 39–50)
HCT VFR BLD CALC: 21.3 % — LOW (ref 39–50)
HCT VFR BLDA CALC: 23 % — LOW (ref 39–51)
HCT VFR BLDA CALC: 23 % — LOW (ref 39–51)
HGB BLD CALC-MCNC: 7.5 G/DL — LOW (ref 12.6–17.4)
HGB BLD CALC-MCNC: 7.5 G/DL — LOW (ref 12.6–17.4)
HGB BLD-MCNC: 7.1 G/DL — LOW (ref 13–17)
HGB BLD-MCNC: 7.1 G/DL — LOW (ref 13–17)
LACTATE BLDV-MCNC: 0.8 MMOL/L — SIGNIFICANT CHANGE UP (ref 0.5–2)
LACTATE BLDV-MCNC: 0.8 MMOL/L — SIGNIFICANT CHANGE UP (ref 0.5–2)
MAGNESIUM SERPL-MCNC: 2.6 MG/DL — SIGNIFICANT CHANGE UP (ref 1.6–2.6)
MAGNESIUM SERPL-MCNC: 2.6 MG/DL — SIGNIFICANT CHANGE UP (ref 1.6–2.6)
MCHC RBC-ENTMCNC: 29.2 PG — SIGNIFICANT CHANGE UP (ref 27–34)
MCHC RBC-ENTMCNC: 29.2 PG — SIGNIFICANT CHANGE UP (ref 27–34)
MCHC RBC-ENTMCNC: 33.3 GM/DL — SIGNIFICANT CHANGE UP (ref 32–36)
MCHC RBC-ENTMCNC: 33.3 GM/DL — SIGNIFICANT CHANGE UP (ref 32–36)
MCV RBC AUTO: 87.7 FL — SIGNIFICANT CHANGE UP (ref 80–100)
MCV RBC AUTO: 87.7 FL — SIGNIFICANT CHANGE UP (ref 80–100)
NRBC # BLD: 0 /100 WBCS — SIGNIFICANT CHANGE UP (ref 0–0)
NRBC # BLD: 0 /100 WBCS — SIGNIFICANT CHANGE UP (ref 0–0)
OTHER CELLS CSF MANUAL: 10.4 ML/DL — LOW (ref 18–22)
OTHER CELLS CSF MANUAL: 10.4 ML/DL — LOW (ref 18–22)
PCO2 BLDV: 35 MMHG — LOW (ref 42–55)
PCO2 BLDV: 35 MMHG — LOW (ref 42–55)
PH BLDV: 7.3 — LOW (ref 7.32–7.43)
PH BLDV: 7.3 — LOW (ref 7.32–7.43)
PHOSPHATE SERPL-MCNC: 5.9 MG/DL — HIGH (ref 2.5–4.5)
PHOSPHATE SERPL-MCNC: 5.9 MG/DL — HIGH (ref 2.5–4.5)
PLATELET # BLD AUTO: 215 K/UL — SIGNIFICANT CHANGE UP (ref 150–400)
PLATELET # BLD AUTO: 215 K/UL — SIGNIFICANT CHANGE UP (ref 150–400)
PO2 BLDV: 94 MMHG — HIGH (ref 25–45)
PO2 BLDV: 94 MMHG — HIGH (ref 25–45)
POTASSIUM BLDV-SCNC: 4.1 MMOL/L — SIGNIFICANT CHANGE UP (ref 3.5–5.1)
POTASSIUM BLDV-SCNC: 4.1 MMOL/L — SIGNIFICANT CHANGE UP (ref 3.5–5.1)
POTASSIUM SERPL-MCNC: 4 MMOL/L — SIGNIFICANT CHANGE UP (ref 3.5–5.3)
POTASSIUM SERPL-MCNC: 4 MMOL/L — SIGNIFICANT CHANGE UP (ref 3.5–5.3)
POTASSIUM SERPL-SCNC: 4 MMOL/L — SIGNIFICANT CHANGE UP (ref 3.5–5.3)
POTASSIUM SERPL-SCNC: 4 MMOL/L — SIGNIFICANT CHANGE UP (ref 3.5–5.3)
RBC # BLD: 2.43 M/UL — LOW (ref 4.2–5.8)
RBC # BLD: 2.43 M/UL — LOW (ref 4.2–5.8)
RBC # FLD: 15.3 % — HIGH (ref 10.3–14.5)
RBC # FLD: 15.3 % — HIGH (ref 10.3–14.5)
SAO2 % BLDV: 98.6 % — HIGH (ref 67–88)
SAO2 % BLDV: 98.6 % — HIGH (ref 67–88)
SODIUM SERPL-SCNC: 139 MMOL/L — SIGNIFICANT CHANGE UP (ref 135–145)
SODIUM SERPL-SCNC: 139 MMOL/L — SIGNIFICANT CHANGE UP (ref 135–145)
WBC # BLD: 8.43 K/UL — SIGNIFICANT CHANGE UP (ref 3.8–10.5)
WBC # BLD: 8.43 K/UL — SIGNIFICANT CHANGE UP (ref 3.8–10.5)
WBC # FLD AUTO: 8.43 K/UL — SIGNIFICANT CHANGE UP (ref 3.8–10.5)
WBC # FLD AUTO: 8.43 K/UL — SIGNIFICANT CHANGE UP (ref 3.8–10.5)

## 2023-11-10 PROCEDURE — 84295 ASSAY OF SERUM SODIUM: CPT

## 2023-11-10 PROCEDURE — 82435 ASSAY OF BLOOD CHLORIDE: CPT

## 2023-11-10 PROCEDURE — 83550 IRON BINDING TEST: CPT

## 2023-11-10 PROCEDURE — 83880 ASSAY OF NATRIURETIC PEPTIDE: CPT

## 2023-11-10 PROCEDURE — 83540 ASSAY OF IRON: CPT

## 2023-11-10 PROCEDURE — 87449 NOS EACH ORGANISM AG IA: CPT

## 2023-11-10 PROCEDURE — 85027 COMPLETE CBC AUTOMATED: CPT

## 2023-11-10 PROCEDURE — 80048 BASIC METABOLIC PNL TOTAL CA: CPT

## 2023-11-10 PROCEDURE — 84132 ASSAY OF SERUM POTASSIUM: CPT

## 2023-11-10 PROCEDURE — 82570 ASSAY OF URINE CREATININE: CPT

## 2023-11-10 PROCEDURE — 83735 ASSAY OF MAGNESIUM: CPT

## 2023-11-10 PROCEDURE — 71250 CT THORAX DX C-: CPT | Mod: MA

## 2023-11-10 PROCEDURE — 80053 COMPREHEN METABOLIC PANEL: CPT

## 2023-11-10 PROCEDURE — 87640 STAPH A DNA AMP PROBE: CPT

## 2023-11-10 PROCEDURE — 99285 EMERGENCY DEPT VISIT HI MDM: CPT | Mod: 25

## 2023-11-10 PROCEDURE — 84300 ASSAY OF URINE SODIUM: CPT

## 2023-11-10 PROCEDURE — 87641 MR-STAPH DNA AMP PROBE: CPT

## 2023-11-10 PROCEDURE — 81001 URINALYSIS AUTO W/SCOPE: CPT

## 2023-11-10 PROCEDURE — 84100 ASSAY OF PHOSPHORUS: CPT

## 2023-11-10 PROCEDURE — 84156 ASSAY OF PROTEIN URINE: CPT

## 2023-11-10 PROCEDURE — 85018 HEMOGLOBIN: CPT

## 2023-11-10 PROCEDURE — 85610 PROTHROMBIN TIME: CPT

## 2023-11-10 PROCEDURE — 83036 HEMOGLOBIN GLYCOSYLATED A1C: CPT

## 2023-11-10 PROCEDURE — 85014 HEMATOCRIT: CPT

## 2023-11-10 PROCEDURE — 87637 SARSCOV2&INF A&B&RSV AMP PRB: CPT

## 2023-11-10 PROCEDURE — 71046 X-RAY EXAM CHEST 2 VIEWS: CPT

## 2023-11-10 PROCEDURE — 99233 SBSQ HOSP IP/OBS HIGH 50: CPT | Mod: GC

## 2023-11-10 PROCEDURE — 82803 BLOOD GASES ANY COMBINATION: CPT

## 2023-11-10 PROCEDURE — 87040 BLOOD CULTURE FOR BACTERIA: CPT

## 2023-11-10 PROCEDURE — 82728 ASSAY OF FERRITIN: CPT

## 2023-11-10 PROCEDURE — 82947 ASSAY GLUCOSE BLOOD QUANT: CPT

## 2023-11-10 PROCEDURE — 87899 AGENT NOS ASSAY W/OPTIC: CPT

## 2023-11-10 PROCEDURE — 85025 COMPLETE CBC W/AUTO DIFF WBC: CPT

## 2023-11-10 PROCEDURE — 36415 COLL VENOUS BLD VENIPUNCTURE: CPT

## 2023-11-10 PROCEDURE — 96374 THER/PROPH/DIAG INJ IV PUSH: CPT

## 2023-11-10 PROCEDURE — 93306 TTE W/DOPPLER COMPLETE: CPT

## 2023-11-10 PROCEDURE — 82330 ASSAY OF CALCIUM: CPT

## 2023-11-10 PROCEDURE — 94640 AIRWAY INHALATION TREATMENT: CPT

## 2023-11-10 PROCEDURE — 84484 ASSAY OF TROPONIN QUANT: CPT

## 2023-11-10 PROCEDURE — 85730 THROMBOPLASTIN TIME PARTIAL: CPT

## 2023-11-10 PROCEDURE — 99239 HOSP IP/OBS DSCHRG MGMT >30: CPT

## 2023-11-10 PROCEDURE — 83605 ASSAY OF LACTIC ACID: CPT

## 2023-11-10 RX ORDER — ALBUTEROL 90 UG/1
2 AEROSOL, METERED ORAL
Qty: 1 | Refills: 0
Start: 2023-11-10 | End: 2023-11-14

## 2023-11-10 RX ORDER — LATANOPROST 0.05 MG/ML
1 SOLUTION/ DROPS OPHTHALMIC; TOPICAL
Qty: 0 | Refills: 0 | DISCHARGE
Start: 2023-11-10

## 2023-11-10 RX ORDER — DOXAZOSIN MESYLATE 4 MG
0 TABLET ORAL
Qty: 0 | Refills: 0 | DISCHARGE

## 2023-11-10 RX ORDER — ATENOLOL 25 MG/1
1 TABLET ORAL
Qty: 0 | Refills: 0 | DISCHARGE
Start: 2023-11-10

## 2023-11-10 RX ORDER — AMLODIPINE BESYLATE 2.5 MG/1
0 TABLET ORAL
Qty: 0 | Refills: 0 | DISCHARGE

## 2023-11-10 RX ORDER — FINASTERIDE 5 MG/1
0 TABLET, FILM COATED ORAL
Qty: 0 | Refills: 0 | DISCHARGE

## 2023-11-10 RX ORDER — CEFPODOXIME PROXETIL 100 MG
1 TABLET ORAL
Qty: 2 | Refills: 0
Start: 2023-11-10 | End: 2023-11-11

## 2023-11-10 RX ORDER — SODIUM BICARBONATE 1 MEQ/ML
2 SYRINGE (ML) INTRAVENOUS
Qty: 180 | Refills: 2
Start: 2023-11-10 | End: 2024-02-07

## 2023-11-10 RX ORDER — SEVELAMER CARBONATE 2400 MG/1
1 POWDER, FOR SUSPENSION ORAL
Qty: 90 | Refills: 2
Start: 2023-11-10 | End: 2024-02-07

## 2023-11-10 RX ORDER — ALLOPURINOL 300 MG
0 TABLET ORAL
Qty: 0 | Refills: 0 | DISCHARGE

## 2023-11-10 RX ORDER — ATENOLOL 25 MG/1
0 TABLET ORAL
Qty: 0 | Refills: 0 | DISCHARGE

## 2023-11-10 RX ORDER — DOXAZOSIN MESYLATE 4 MG
1 TABLET ORAL
Qty: 0 | Refills: 0 | DISCHARGE
Start: 2023-11-10

## 2023-11-10 RX ORDER — SITAGLIPTIN 50 MG/1
0 TABLET, FILM COATED ORAL
Qty: 0 | Refills: 0 | DISCHARGE

## 2023-11-10 RX ORDER — AMLODIPINE BESYLATE 2.5 MG/1
1 TABLET ORAL
Qty: 0 | Refills: 0 | DISCHARGE
Start: 2023-11-10

## 2023-11-10 RX ORDER — FINASTERIDE 5 MG/1
1 TABLET, FILM COATED ORAL
Qty: 0 | Refills: 0 | DISCHARGE
Start: 2023-11-10

## 2023-11-10 RX ORDER — DORZOLAMIDE HYDROCHLORIDE TIMOLOL MALEATE 20; 5 MG/ML; MG/ML
0 SOLUTION/ DROPS OPHTHALMIC
Qty: 0 | Refills: 0 | DISCHARGE

## 2023-11-10 RX ORDER — LEVOCETIRIZINE DIHYDROCHLORIDE 0.5 MG/ML
0 SOLUTION ORAL
Qty: 0 | Refills: 0 | DISCHARGE

## 2023-11-10 RX ORDER — DORZOLAMIDE HYDROCHLORIDE TIMOLOL MALEATE 20; 5 MG/ML; MG/ML
1 SOLUTION/ DROPS OPHTHALMIC
Qty: 0 | Refills: 0 | DISCHARGE
Start: 2023-11-10

## 2023-11-10 RX ORDER — LATANOPROST 0.05 MG/ML
0 SOLUTION/ DROPS OPHTHALMIC; TOPICAL
Qty: 0 | Refills: 0 | DISCHARGE

## 2023-11-10 RX ADMIN — Medication 30 MILLILITER(S): at 05:46

## 2023-11-10 RX ADMIN — Medication 1300 MILLIGRAM(S): at 05:47

## 2023-11-10 RX ADMIN — SEVELAMER CARBONATE 800 MILLIGRAM(S): 2400 POWDER, FOR SUSPENSION ORAL at 08:46

## 2023-11-10 RX ADMIN — SEVELAMER CARBONATE 800 MILLIGRAM(S): 2400 POWDER, FOR SUSPENSION ORAL at 13:14

## 2023-11-10 RX ADMIN — LORATADINE 10 MILLIGRAM(S): 10 TABLET ORAL at 13:14

## 2023-11-10 RX ADMIN — Medication 1300 MILLIGRAM(S): at 13:14

## 2023-11-10 RX ADMIN — DORZOLAMIDE HYDROCHLORIDE TIMOLOL MALEATE 1 DROP(S): 20; 5 SOLUTION/ DROPS OPHTHALMIC at 05:47

## 2023-11-10 RX ADMIN — Medication 3 MILLILITER(S): at 13:14

## 2023-11-10 RX ADMIN — AMLODIPINE BESYLATE 5 MILLIGRAM(S): 2.5 TABLET ORAL at 09:18

## 2023-11-10 RX ADMIN — ATENOLOL 50 MILLIGRAM(S): 25 TABLET ORAL at 05:46

## 2023-11-10 RX ADMIN — Medication 100 MILLIGRAM(S): at 05:45

## 2023-11-10 RX ADMIN — FINASTERIDE 5 MILLIGRAM(S): 5 TABLET, FILM COATED ORAL at 13:14

## 2023-11-10 RX ADMIN — Medication 3 MILLILITER(S): at 05:47

## 2023-11-10 RX ADMIN — Medication 30 MILLILITER(S): at 13:15

## 2023-11-10 NOTE — MEDICAL STUDENT PROGRESS NOTE(EDUCATION) - SUBJECTIVE AND OBJECTIVE BOX
84 yo M with a PMH of DM2, HTN, HLD, CKD presents with non productive cough, SOB, wheezing, low grade temp and chills for 1 week. He went to  and was found to be COVID and flu negative and received moxifloxacin and tessalon pearls. Reports he was making his usual amount of urine with daily torsemide. He has no headache, dizziness, chest pain, abdominal pain, nausea/vomiting/diarrhea, or pain on urination. He was found to have RUL consolidation on CXR and CT chest and worsening renal function with Cr around 9 (up from baseline of 5).     INTERVAL EVENTS: No acute events overnight. Patient afebrile O/N.      OBJECTIVE:     LABS:                        7.1    8.43  )-----------( 215      ( 10 Nov 2023 06:16 )             21.3     11-10    139  |  106  |  104<H>  ----------------------------<  100<H>  4.0   |  16<L>  |  9.42<H>    Ca    8.5      10 Nov 2023 06:16  Phos  5.9     11-10  Mg     2.6     11-10      Urinalysis Basic - ( 10 Nov 2023 06:16 )    Color: x / Appearance: x / SG: x / pH: x  Gluc: 100 mg/dL / Ketone: x  / Bili: x / Urobili: x   Blood: x / Protein: x / Nitrite: x   Leuk Esterase: x / RBC: x / WBC x   Sq Epi: x / Non Sq Epi: x / Bacteria: x      I&O's Summary    09 Nov 2023 07:01  -  10 Nov 2023 07:00  --------------------------------------------------------  IN: 370 mL / OUT: 1825 mL / NET: -1455 mL      IMAGING:   ACC: [id number] EXAM:  XR CHEST PA LAT 2V   ORDERED BY:  COLTEN PEREZ     PROCEDURE DATE:  11/06/2023      INTERPRETATION:  INDICATION: Short of breath, cough and wheezing    PA and lateral chest    COMPARISON: 3/21/2023    FINDINGS:  Heart/Vascular: The heart size, mediastinum, hilum and aorta are within   normal limits for projection.  Pulmonary: Midline trachea. There is right perihilar and upper lobe   airspace infiltrate/consolidation.  Bones: There is no fracture. There is moderate dextroscoliosis with   degenerative changes of the spine.  Lines and catheter: None    Impression:    There is right perihilar and upper lobe airspace infiltrate/consolidation.    ACC: [id number] EXAM:  CT CHEST   ORDERED BY:  COLTEN PEREZ     PROCEDURE DATE:  11/06/2023        INTERPRETATION:  Clinical indication: Cough and wheezing.    Axial CT images of the chest are obtained without intravenous   administration of contrast.    Comparison is made with the prior chest CT of September 29, 2022.    Dominant 1 cm hypodense nodule within the right lobe of the thyroid gland   is without significant interval change since September 29, 2022.    No enlarged axillary, mediastinal or hilar lymph nodes.    No pericardial effusion. Heart size is enlarged. Coronary artery   calcifications. Aorta is tortuous with areas of intimal calcification.    Evaluation of the upper abdomen demonstrate cholelithiasis. A few   hypodensities within the liver with the largest measuring about 1.3 cm   are without significant interval change. Hypodense and hyperdense lesions   within the partially imaged kidneys as on the prior study.    Small right pleural effusion is new since September 29, 2022. Trace left   pleural effusion.    Evaluation of the lungs demonstrate vertically oriented patchy   consolidation within the right upper and right middle containing dense   and groundglass components new since September 29, 2022. Superimposed   areas of interlobular septal thickening.    No central endobronchial lesions.    Spinal scoliosis. Degenerative changes of the spine. 5 mm bone island   within the left sixth rib is unchanged.    IMPRESSION: Right mid to upper lung consolidation new since September 29, 2020 likely pneumonia. A 1 to 3 month follow-up noncontrast chest CT is   recommended to ensure resolution.    Small right pleural effusion.      TRANSTHORACIC ECHOCARDIOGRAM REPORT  ________________________________________________________________________________                                      _______       Pt. Name:       JCARLOS PARIKH Study Date:    11/8/2023  MRN:            [id number]      YOB: 1940  Accession #:    [id number]       Age:           83 years  Account#:       [id number]    Gender:        M  Heart Rate:                     Height:        69.00 in (175.26 cm)  Rhythm:                         Weight:        142.00 lb (64.41 kg)  Blood Pressure: 137/70 mmHg     BSA/BMI:       1.79 m² / 20.97 kg/m²  ________________________________________________________________________________________  Referring Physician:    [id number] Abner Gomez  Interpreting Physician: Cassandra Silverio MD  Primary Sonographer:    Angelique Fuentes ZEFERINO    CPT:               ECHO TTE WO CON COMP W DOPP - 56962.m  Indication(s):     Edema, unspecified - R60.9  Procedure:         Transthoracic echocardiogram with 2-D, M-mode and complete                     spectral and color flow Doppler.  Ordering Location: [facility] Reaction:      Patient had no adverse reaction after injection of Ultrasound                     Enhancing Agent.  Study Information: Image quality for this study is adequate.    _______________________________________________________________________________________     CONCLUSIONS:      1. Left ventricular cavity is normal. Left ventricular wall thickness is normal. Left ventricular systolic function is normal with an ejection fraction of 65 % by Myers's method of disks. There are no regional wall motion abnormalities seen.   2. There is normal LV mass and concentric remodeling.   3. Normal left ventricular diastolic function, with normal filling pressure.   4. Normal right ventricular cavity size, wall thickness, and systolic function.   5. Mild to moderate mitral regurgitation.   6. Mild to moderate tricuspid regurgitation.   7. Mild to moderate aortic regurgitation.   8. Mild to moderate pulmonic regurgitation.   9. Estimated pulmonary artery systolic pressure is 58 mmHg, consistent with moderate pulmonary hypertension.  10. No pericardial effusion seen.    PHYSICAL EXAM:  T(C): 37.4 (11-10-23 @ 06:00), Max: 37.4 (11-10-23 @ 06:00)  HR: 78 (11-10-23 @ 06:00) (71 - 78)  BP: 132/68 (11-10-23 @ 06:00) (130/70 - 154/66)  RR: 18 (11-10-23 @ 06:00) (18 - 18)  SpO2: 96% (11-10-23 @ 06:00) (93% - 96%)    GENERAL: Well-appearing, well-nourished, NAD  EYES: EOMI, PERRL no scleral icterus  NECK: No JVD, no nodules, no carotid bruits  LUNG: B/l wheezes and scattered crackles, no accessory muscle use  HEART: RRR, no m/g/r  ABDOMEN: Soft, NT,+ bowel sounds, no bruits, mild abdominal distension  EXTREMITIES: trace BLE edema, 2+ DP pulses, no clubbing, no cyanosis  PSYCH: AAOx3, normal affect, normal mood  NEUROLOGY: non-focal, strength 5/5 in all extremities, sensation intact  SKIN: No rashes or lesions 84 yo M with a PMH of DM2, HTN, HLD, CKD presents with non productive cough, SOB, wheezing, low grade temp and chills for 1 week. He went to  and was found to be COVID and flu negative and received moxifloxacin and tessalon pearls. Reports he was making his usual amount of urine with daily torsemide. He has no headache, dizziness, chest pain, abdominal pain, nausea/vomiting/diarrhea, or pain on urination. He was found to have RUL consolidation on CXR and CT chest and worsening renal function with Cr around 9 (up from baseline of 5).     INTERVAL EVENTS: No acute events overnight. Patient afebrile O/N with Tmax 99.4. He reports a productive cough this morning with similar frequency to yesterday but overall is feeling better today.      OBJECTIVE:     LABS:                        7.1    8.43  )-----------( 215      ( 10 Nov 2023 06:16 )             21.3     11-10    139  |  106  |  104<H>  ----------------------------<  100<H>  4.0   |  16<L>  |  9.42<H>    Ca    8.5      10 Nov 2023 06:16  Phos  5.9     11-10  Mg     2.6     11-10      Urinalysis Basic - ( 10 Nov 2023 06:16 )    Color: x / Appearance: x / SG: x / pH: x  Gluc: 100 mg/dL / Ketone: x  / Bili: x / Urobili: x   Blood: x / Protein: x / Nitrite: x   Leuk Esterase: x / RBC: x / WBC x   Sq Epi: x / Non Sq Epi: x / Bacteria: x      I&O's Summary    09 Nov 2023 07:01  -  10 Nov 2023 07:00  --------------------------------------------------------  IN: 370 mL / OUT: 1825 mL / NET: -1455 mL      IMAGING:   ACC: [id number] EXAM:  XR CHEST PA LAT 2V   ORDERED BY:  VASILIKI KARATZIAS     PROCEDURE DATE:  11/06/2023      INTERPRETATION:  INDICATION: Short of breath, cough and wheezing    PA and lateral chest    COMPARISON: 3/21/2023    FINDINGS:  Heart/Vascular: The heart size, mediastinum, hilum and aorta are within   normal limits for projection.  Pulmonary: Midline trachea. There is right perihilar and upper lobe   airspace infiltrate/consolidation.  Bones: There is no fracture. There is moderate dextroscoliosis with   degenerative changes of the spine.  Lines and catheter: None    Impression:    There is right perihilar and upper lobe airspace infiltrate/consolidation.    ACC: [id number] EXAM:  CT CHEST   ORDERED BY:  COLTEN PEREZ     PROCEDURE DATE:  11/06/2023        INTERPRETATION:  Clinical indication: Cough and wheezing.    Axial CT images of the chest are obtained without intravenous   administration of contrast.    Comparison is made with the prior chest CT of September 29, 2022.    Dominant 1 cm hypodense nodule within the right lobe of the thyroid gland   is without significant interval change since September 29, 2022.    No enlarged axillary, mediastinal or hilar lymph nodes.    No pericardial effusion. Heart size is enlarged. Coronary artery   calcifications. Aorta is tortuous with areas of intimal calcification.    Evaluation of the upper abdomen demonstrate cholelithiasis. A few   hypodensities within the liver with the largest measuring about 1.3 cm   are without significant interval change. Hypodense and hyperdense lesions   within the partially imaged kidneys as on the prior study.    Small right pleural effusion is new since September 29, 2022. Trace left   pleural effusion.    Evaluation of the lungs demonstrate vertically oriented patchy   consolidation within the right upper and right middle containing dense   and groundglass components new since September 29, 2022. Superimposed   areas of interlobular septal thickening.    No central endobronchial lesions.    Spinal scoliosis. Degenerative changes of the spine. 5 mm bone island   within the left sixth rib is unchanged.    IMPRESSION: Right mid to upper lung consolidation new since September 29, 2020 likely pneumonia. A 1 to 3 month follow-up noncontrast chest CT is   recommended to ensure resolution.    Small right pleural effusion.      TRANSTHORACIC ECHOCARDIOGRAM REPORT  ________________________________________________________________________________                                      _______       Pt. Name:       JCARLOS PARIKH Study Date:    11/8/2023  MRN:            [id number]      YOB: 1940  Accession #:    [id number]       Age:           83 years  Account#:       [id number]    Gender:        M  Heart Rate:                     Height:        69.00 in (175.26 cm)  Rhythm:                         Weight:        142.00 lb (64.41 kg)  Blood Pressure: 137/70 mmHg     BSA/BMI:       1.79 m² / 20.97 kg/m²  ________________________________________________________________________________________  Referring Physician:    [id number] Abner Gomez  Interpreting Physician: Cassandra Silverio MD  Primary Sonographer:    Angelique Fuentes RUST    CPT:               ECHO TTE WO CON COMP W DOPP - 65720.m  Indication(s):     Edema, unspecified - R60.9  Procedure:         Transthoracic echocardiogram with 2-D, M-mode and complete                     spectral and color flow Doppler.  Ordering Location: [address] [place] [address] Reaction:      Patient had no adverse reaction after injection of Ultrasound                     Enhancing Agent.  Study Information: Image quality for this study is adequate.    _______________________________________________________________________________________     CONCLUSIONS:      1. Left ventricular cavity is normal. Left ventricular wall thickness is normal. Left ventricular systolic function is normal with an ejection fraction of 65 % by Myers's method of disks. There are no regional wall motion abnormalities seen.   2. There is normal LV mass and concentric remodeling.   3. Normal left ventricular diastolic function, with normal filling pressure.   4. Normal right ventricular cavity size, wall thickness, and systolic function.   5. Mild to moderate mitral regurgitation.   6. Mild to moderate tricuspid regurgitation.   7. Mild to moderate aortic regurgitation.   8. Mild to moderate pulmonic regurgitation.   9. Estimated pulmonary artery systolic pressure is 58 mmHg, consistent with moderate pulmonary hypertension.  10. No pericardial effusion seen.    PHYSICAL EXAM:  T(C): 37.4 (11-10-23 @ 06:00), Max: 37.4 (11-10-23 @ 06:00)  HR: 78 (11-10-23 @ 06:00) (71 - 78)  BP: 132/68 (11-10-23 @ 06:00) (130/70 - 154/66)  RR: 18 (11-10-23 @ 06:00) (18 - 18)  SpO2: 96% (11-10-23 @ 06:00) (93% - 96%)    GENERAL: Well-appearing, well-nourished, NAD  EYES: EOMI, PERRL no scleral icterus  NECK: No JVD, no nodules, no carotid bruits  LUNG: B/l wheezes and scattered crackles, no accessory muscle use  HEART: RRR, no m/g/r  ABDOMEN: Soft, NT,+ bowel sounds, no bruits, mild abdominal distension  EXTREMITIES: trace BLE edema, 2+ DP pulses, no clubbing, no cyanosis  PSYCH: AAOx3, normal affect, normal mood  NEUROLOGY: non-focal, strength 5/5 in all extremities, sensation intact  SKIN: No rashes or lesions

## 2023-11-10 NOTE — PROGRESS NOTE ADULT - PROBLEM SELECTOR PLAN 7
VTE ppx: scd    GI ppx: n/i  Diet: Consistent carb, DASH, renal  GOC: DNR/DNI, will discuss with family if they are interested in palliative discussion due to impending decline 2/2 metabolic acidosis w/o HD. Home hospice services currently being arranged; referrals sent to [facility] VTE ppx: scd    GI ppx: n/i  Diet: Consistent carb, DASH, renal  GOC: DNR/DNI, will discuss with family if they are interested in palliative discussion due to impending decline 2/2 metabolic acidosis w/o HD. Home hospice services currently being arranged; referrals sent to [facility]. Likely d/c today with home care set up

## 2023-11-10 NOTE — PROGRESS NOTE ADULT - SUBJECTIVE AND OBJECTIVE BOX
[facility] Division of Kidney Diseases & Hypertension  FOLLOW UP NOTE  [phone]--------------------------------------------------------------------------------    Chief Complaint: ERIC on CKD    24 hour events/subjective: Pt seen and examined at the bedside this morning. Pt continues to report feeling better than previous days.    PAST HISTORY  --------------------------------------------------------------------------------  No significant changes to PMH, PSH, FHx, SHx, unless otherwise noted    ALLERGIES & MEDICATIONS  --------------------------------------------------------------------------------  Allergies  No Known Allergies    Intolerances    Standing Inpatient Medications  albuterol/ipratropium for Nebulization 3 milliLiter(s) Nebulizer every 6 hours  amLODIPine   Tablet 5 milliGRAM(s) Oral <User Schedule>  atenolol  Tablet 50 milliGRAM(s) Oral daily  cefTRIAXone   IVPB 1000 milliGRAM(s) IV Intermittent every 24 hours  chlorhexidine 4% Liquid 1 Application(s) Topical daily  citric acid/sodium citrate Solution 30 milliLiter(s) Oral three times a day  dorzolamide 2%/timolol 0.5% Ophthalmic Solution 1 Drop(s) Both EYES two times a day  doxazosin 8 milliGRAM(s) Oral daily  finasteride 5 milliGRAM(s) Oral daily  lactated ringers. 250 milliLiter(s) IV Continuous <Continuous>  latanoprost 0.005% Ophthalmic Solution 1 Drop(s) Both EYES at bedtime  loratadine 10 milliGRAM(s) Oral daily  sevelamer carbonate 800 milliGRAM(s) Oral three times a day with meals  sodium bicarbonate 1300 milliGRAM(s) Oral three times a day    PRN Inpatient Medications  acetaminophen     Tablet .. 650 milliGRAM(s) Oral every 6 hours PRN  aluminum hydroxide/magnesium hydroxide/simethicone Suspension 30 milliLiter(s) Oral every 4 hours PRN  benzonatate 100 milliGRAM(s) Oral three times a day PRN  melatonin 3 milliGRAM(s) Oral at bedtime PRN  ondansetron Injectable 4 milliGRAM(s) IV Push every 8 hours PRN    REVIEW OF SYSTEMS  --------------------------------------------------------------------------------  Gen: No fevers/chills  Skin: No rashes  Head/Eyes/Ears/Mouth: No headache  Respiratory: No dyspnea, cough  CV: No chest pain  GI: No abdominal pain, diarrhea, constipation, nausea, vomiting  : No increased frequency, dysuria  MSK: No joint pain, no edema  Neuro: No dizziness/lightheadedness, weakness    All other systems were reviewed and are negative, except as noted.    VITALS/PHYSICAL EXAM  --------------------------------------------------------------------------------  T(C): 37.4 (11-10-23 @ 06:00), Max: 37.4 (11-10-23 @ 06:00)  HR: 76 (11-10-23 @ 08:48) (71 - 78)  BP: 137/70 (11-10-23 @ 08:48) (130/70 - 137/70)  RR: 18 (11-10-23 @ 06:00) (18 - 18)  SpO2: 96% (11-10-23 @ 06:00) (93% - 96%)  Wt(kg): --    11-09-23 @ 07:01  -  11-10-23 @ 07:00  --------------------------------------------------------  IN: 370 mL / OUT: 1825 mL / NET: -1455 mL    Physical Exam:  Gen: NAD  HEENT: MMM  Pulm: mild crackles in bases B/L  CV: S1S2  Abd: Soft, +BS, no tenderness to palpation  Ext: No B/L LE edema  Neuro: Awake  Skin: Warm and dry    LABS/STUDIES  --------------------------------------------------------------------------------              7.1    8.43  >-----------<  215      [11-10-23 @ 06:16]              21.3     139  |  106  |  104  ----------------------------<  100      [11-10-23 @ 06:16]  4.0   |  16  |  9.42        Ca     8.5     [11-10-23 @ 06:16]      Mg     2.6     [11-10-23 @ 06:16]      Phos  5.9     [11-10-23 @ 06:16]    Creatinine Trend:  SCr 9.42 [11-10 @ 06:16]  SCr 9.88 [11-09 @ 06:36]  SCr 10.09 [11-08 @ 06:46]  SCr 9.75 [11-07 @ 08:32]  SCr 9.82 [11-07 @ 06:42]    Urine Creatinine 96      [11-07-23 @ 18:56]  Urine Protein 253      [11-07-23 @ 18:56]  Urine Sodium 32      [11-07-23 @ 18:56]    Iron 18, TIBC 151, %sat 12      [11-08-23 @ 06:46]  Ferritin 799      [11-08-23 @ 06:46]

## 2023-11-10 NOTE — DISCHARGE NOTE NURSING/CASE MANAGEMENT/SOCIAL WORK - NSDCPEFALRISK_GEN_ALL_CORE
For information on Fall & Injury Prevention, visit: [email or web address] OR  [email or web address] OR  https://www.cdc.gov/steadi/patient.html

## 2023-11-10 NOTE — MEDICAL STUDENT PROGRESS NOTE(EDUCATION) - ASSESSMENT
84 yo M with a PMH of DM2, HTN, HLD, CKD presents with non productive cough, SOB, wheezing, low grade temp and chills for 1 week. He went to  and was found to be COVID and flu negative and received moxifloxacin and tessalon pearls. Reports he was making his usual amount of urine with daily torsemide. He has no headache, dizziness, chest pain, abdominal pain, nausea/vomiting/diarrhea, or pain on urination. He was found to have RUL consolidation on CXR and CT chest and worsening renal function with Cr around 9 (up from baseline of 5). On ceftriaxone for the PNA, but no fluids or diuretics per nephro.

## 2023-11-10 NOTE — PROGRESS NOTE ADULT - PROBLEM SELECTOR PLAN 4
Hyperphosphatemia in the setting of acute renal failure. Phos elevated at 5.9 today. Continue on Sevelamer 800mg TID. Monitor phos levels.     If you have any questions, please feel free to contact me.  Haris Adame MD  Nephrology Fellow  [id number] / [employer] Teams (Preferred)  (After 4pm or on weekends, please call the on-call Fellow)

## 2023-11-10 NOTE — MEDICAL STUDENT PROGRESS NOTE(EDUCATION) - PLAN 7
- Pt does not want HD and understands that with his worsening renal fcn it may be fatal without dialysis  - Have discussed with the patient and the family and he wants to be discharged home with recommendations for home hospice services

## 2023-11-10 NOTE — PROGRESS NOTE ADULT - SUBJECTIVE AND OBJECTIVE BOX
Leann Szymanski MD  PGY1  Preferred contact via [employer] Teams      Patient is a 83y old  Male who presents with a chief complaint of pneumonia (07 Nov 2023 14:11)      SUBJECTIVE / OVERNIGHT EVENTS: No ON events. TMax 99.4. He still complains of nonproductive cough but feels that it has become less frequent.    MEDICATIONS  (STANDING):  albuterol/ipratropium for Nebulization 3 milliLiter(s) Nebulizer every 6 hours  amLODIPine   Tablet 5 milliGRAM(s) Oral <User Schedule>  atenolol  Tablet 50 milliGRAM(s) Oral daily  cefTRIAXone   IVPB 1000 milliGRAM(s) IV Intermittent every 24 hours  dorzolamide 2%/timolol 0.5% Ophthalmic Solution 1 Drop(s) Both EYES two times a day  doxazosin 8 milliGRAM(s) Oral daily  epoetin shaniqua-epbx (RETACRIT) Injectable 20155 Unit(s) IV Push every 7 days  finasteride 5 milliGRAM(s) Oral daily  lactated ringers. 250 milliLiter(s) (75 mL/Hr) IV Continuous <Continuous>  latanoprost 0.005% Ophthalmic Solution 1 Drop(s) Both EYES at bedtime  loratadine 10 milliGRAM(s) Oral daily  sodium bicarbonate 1300 milliGRAM(s) Oral three times a day    MEDICATIONS  (PRN):  acetaminophen     Tablet .. 650 milliGRAM(s) Oral every 6 hours PRN Temp greater or equal to 38C (100.4F), Mild Pain (1 - 3)  albuterol/ipratropium for Nebulization. 3 milliLiter(s) Nebulizer once PRN Shortness of Breath and/or Wheezing  aluminum hydroxide/magnesium hydroxide/simethicone Suspension 30 milliLiter(s) Oral every 4 hours PRN Dyspepsia  melatonin 3 milliGRAM(s) Oral at bedtime PRN Insomnia  ondansetron Injectable 4 milliGRAM(s) IV Push every 8 hours PRN Nausea and/or Vomiting      I&O's Summary    09 Nov 2023 07:01  -  10 Nov 2023 07:00  --------------------------------------------------------  IN: 370 mL / OUT: 875 mL / NET: -505 mL      PHYSICAL EXAM:    Vital Signs Last 24 Hrs  T(C): 37.4 (10 Nov 2023 06:00), Max: 37.4 (10 Nov 2023 06:00)  T(F): 99.4 (10 Nov 2023 06:00), Max: 99.4 (10 Nov 2023 06:00)  HR: 78 (10 Nov 2023 06:00) (71 - 78)  BP: 132/68 (10 Nov 2023 06:00) (130/70 - 154/66)  RR: 18 (10 Nov 2023 06:00) (18 - 18)  SpO2: 96% (10 Nov 2023 06:00) (93% - 96%)    Parameters below as of 10 Nov 2023 06:00  Patient On (Oxygen Delivery Method): room air    GENERAL: NAD, lying in bed comfortably  HEAD:  Atraumatic, normocephalic  EYES: EOMI, PERRLA, conjunctiva and sclera clear  ENT: Moist mucous membranes  NECK: Supple, no JVD  HEART: Regular rate and rhythm, no murmurs, rubs, or gallops  LUNGS: Unlabored respirations.  Clear to auscultation bilaterally.  ABDOMEN: Soft, nontender, mildly distended but improving. BS  EXTREMITIES: 2+ peripheral pulses bilaterally. No edema. No clubbing, cyanosis.  NERVOUS SYSTEM:  A&Ox3, no focal deficits   SKIN: No rashes or lesions      LABS:                                           7.1    8.43  )-----------( 215      ( 10 Nov 2023 06:16 )             21.3     11-10    139  |  106  |  104<H>  ----------------------------<  100<H>  4.0   |  16<L>  |  9.42<H>    Ca    8.5      10 Nov 2023 06:16  Phos  5.9     11-10  Mg     2.6     11-10      Urinalysis Basic - ( 08 Nov 2023 06:46 )    Color: x / Appearance: x / SG: x / pH: x  Gluc: 104 mg/dL / Ketone: x  / Bili: x / Urobili: x   Blood: x / Protein: x / Nitrite: x   Leuk Esterase: x / RBC: x / WBC x   Sq Epi: x / Non Sq Epi: x / Bacteria: x        RADIOLOGY & ADDITIONAL TESTS:  < from: CT Chest No Cont (11.06.23 @ 17:10) >  IMPRESSION: Right mid to upper lung consolidation new since September 29, 2020 likely pneumonia. A 1 to 3 month follow-up noncontrast chest CT is   recommended to ensure resolution.    Small right pleural effusion.    < end of copied text >  < from: Xray Chest 2 Views PA/Lat (11.06.23 @ 14:31) >    Impression:    There is right perihilar and upper lobe airspace infiltrate/consolidation.    < end of copied text >    < from: TTE W or WO Ultrasound Enhancing Agent (11.08.23 @ 15:53) >     CONCLUSIONS:      1. Left ventricular cavity is normal. Left ventricular wall thickness is normal. Left ventricular systolic function is normal with an ejection fraction of 65 % by Myers's method of disks. There are no regional wall motion abnormalities seen.   2. There is normal LV mass and concentric remodeling.   3. Normal left ventricular diastolic function, with normal filling pressure.   4. Normal right ventricular cavity size, wall thickness, and systolic function.   5. Mild to moderate mitral regurgitation.   6. Mild to moderate tricuspid regurgitation.   7. Mild to moderate aortic regurgitation.   8. Mild to moderate pulmonic regurgitation.   9. Estimated pulmonary artery systolic pressure is 58 mmHg, consistent with moderate pulmonary hypertension.  10. No pericardial effusion seen.    < end of copied text >   Leann Szymanski MD  PGY1  Preferred contact via [employer] Teams      Patient is a 83y old  Male who presents with a chief complaint of pneumonia (07 Nov 2023 14:11)      SUBJECTIVE / OVERNIGHT EVENTS: No ON events. TMax 99.4. Still has cough, more productive than yesterday. Intermittent toe pain.     MEDICATIONS  (STANDING):  albuterol/ipratropium for Nebulization 3 milliLiter(s) Nebulizer every 6 hours  amLODIPine   Tablet 5 milliGRAM(s) Oral <User Schedule>  atenolol  Tablet 50 milliGRAM(s) Oral daily  cefTRIAXone   IVPB 1000 milliGRAM(s) IV Intermittent every 24 hours  dorzolamide 2%/timolol 0.5% Ophthalmic Solution 1 Drop(s) Both EYES two times a day  doxazosin 8 milliGRAM(s) Oral daily  epoetin shaniqua-epbx (RETACRIT) Injectable 84397 Unit(s) IV Push every 7 days  finasteride 5 milliGRAM(s) Oral daily  lactated ringers. 250 milliLiter(s) (75 mL/Hr) IV Continuous <Continuous>  latanoprost 0.005% Ophthalmic Solution 1 Drop(s) Both EYES at bedtime  loratadine 10 milliGRAM(s) Oral daily  sodium bicarbonate 1300 milliGRAM(s) Oral three times a day    MEDICATIONS  (PRN):  acetaminophen     Tablet .. 650 milliGRAM(s) Oral every 6 hours PRN Temp greater or equal to 38C (100.4F), Mild Pain (1 - 3)  albuterol/ipratropium for Nebulization. 3 milliLiter(s) Nebulizer once PRN Shortness of Breath and/or Wheezing  aluminum hydroxide/magnesium hydroxide/simethicone Suspension 30 milliLiter(s) Oral every 4 hours PRN Dyspepsia  melatonin 3 milliGRAM(s) Oral at bedtime PRN Insomnia  ondansetron Injectable 4 milliGRAM(s) IV Push every 8 hours PRN Nausea and/or Vomiting      I&O's Summary    09 Nov 2023 07:01  -  10 Nov 2023 07:00  --------------------------------------------------------  IN: 370 mL / OUT: 875 mL / NET: -505 mL      PHYSICAL EXAM:    Vital Signs Last 24 Hrs  T(C): 37.4 (10 Nov 2023 06:00), Max: 37.4 (10 Nov 2023 06:00)  T(F): 99.4 (10 Nov 2023 06:00), Max: 99.4 (10 Nov 2023 06:00)  HR: 78 (10 Nov 2023 06:00) (71 - 78)  BP: 132/68 (10 Nov 2023 06:00) (130/70 - 154/66)  RR: 18 (10 Nov 2023 06:00) (18 - 18)  SpO2: 96% (10 Nov 2023 06:00) (93% - 96%)    Parameters below as of 10 Nov 2023 06:00  Patient On (Oxygen Delivery Method): room air    GENERAL: NAD, lying in bed comfortably  HEAD:  Atraumatic, normocephalic  EYES: EOMI, PERRLA, conjunctiva and sclera clear  ENT: Moist mucous membranes  NECK: Supple, no JVD  HEART: Regular rate and rhythm, no murmurs, rubs, or gallops  LUNGS: Unlabored respirations.  Clear to auscultation bilaterally.  ABDOMEN: Soft, nontender, mildly distended but improving. BS  EXTREMITIES: 2+ peripheral pulses bilaterally. No edema. No clubbing, cyanosis.  NERVOUS SYSTEM:  A&Ox3, no focal deficits   SKIN: No rashes or lesions      LABS:                                           7.1    8.43  )-----------( 215      ( 10 Nov 2023 06:16 )             21.3     11-10    139  |  106  |  104<H>  ----------------------------<  100<H>  4.0   |  16<L>  |  9.42<H>    Ca    8.5      10 Nov 2023 06:16  Phos  5.9     11-10  Mg     2.6     11-10      Urinalysis Basic - ( 08 Nov 2023 06:46 )    Color: x / Appearance: x / SG: x / pH: x  Gluc: 104 mg/dL / Ketone: x  / Bili: x / Urobili: x   Blood: x / Protein: x / Nitrite: x   Leuk Esterase: x / RBC: x / WBC x   Sq Epi: x / Non Sq Epi: x / Bacteria: x        RADIOLOGY & ADDITIONAL TESTS:  < from: CT Chest No Cont (11.06.23 @ 17:10) >  IMPRESSION: Right mid to upper lung consolidation new since September 29, 2020 likely pneumonia. A 1 to 3 month follow-up noncontrast chest CT is   recommended to ensure resolution.    Small right pleural effusion.    < end of copied text >  < from: Xray Chest 2 Views PA/Lat (11.06.23 @ 14:31) >    Impression:    There is right perihilar and upper lobe airspace infiltrate/consolidation.    < end of copied text >    < from: TTE W or WO Ultrasound Enhancing Agent (11.08.23 @ 15:53) >     CONCLUSIONS:      1. Left ventricular cavity is normal. Left ventricular wall thickness is normal. Left ventricular systolic function is normal with an ejection fraction of 65 % by Myers's method of disks. There are no regional wall motion abnormalities seen.   2. There is normal LV mass and concentric remodeling.   3. Normal left ventricular diastolic function, with normal filling pressure.   4. Normal right ventricular cavity size, wall thickness, and systolic function.   5. Mild to moderate mitral regurgitation.   6. Mild to moderate tricuspid regurgitation.   7. Mild to moderate aortic regurgitation.   8. Mild to moderate pulmonic regurgitation.   9. Estimated pulmonary artery systolic pressure is 58 mmHg, consistent with moderate pulmonary hypertension.  10. No pericardial effusion seen.    < end of copied text >

## 2023-11-10 NOTE — MEDICAL STUDENT PROGRESS NOTE(EDUCATION) - PLAN 8
- SCDs for VTE Prophylaxis  - Pt is able to ambulate OOB to chair and to the bathroom
- SCDs for VTE Prophylaxis  - Pt is able to ambulate OOB to chair and to the bathroom

## 2023-11-10 NOTE — PROGRESS NOTE ADULT - PROBLEM SELECTOR PROBLEM 1
Lobar pneumonia
Acute kidney injury superimposed on CKD
Lobar pneumonia
Acute kidney injury superimposed on CKD
Lobar pneumonia
Lobar pneumonia
Acute kidney injury superimposed on CKD

## 2023-11-10 NOTE — PROGRESS NOTE ADULT - REASON FOR ADMISSION
Left message to return office call and schedule for appointment as requested by PCP     ----- Message from Barbie Gandara MD sent at 8/11/2021  3:04 PM EDT -----  Please schedule an office visit in 1-2 weeks to discuss lab results  Needs to be 40 mins as patient is diabetic and has many complex medical conditions that needs to be addressed  Thanks,  Dr Syl Chaparro 
Patient returned office call asked to schedule appointment and he said in a week he will call us
pneumonia

## 2023-11-10 NOTE — PROGRESS NOTE ADULT - PROBLEM SELECTOR PLAN 2
Pt with anemia in the setting of advanced CKD. Pt Hgb normally ~10. Hgb remains low today at 7.1. Pt received Aranesp on 9/18 in outpatient setting. Iron studies reviewed with low iron levels and low Tsat. Ferritin however elevated at 799. Transfusion as per primary team. Received weekly EPO on 11/7. Monitor Hgb levels.

## 2023-11-10 NOTE — PROGRESS NOTE ADULT - PROBLEM SELECTOR PROBLEM 2
Acute kidney injury superimposed on CKD
Acute kidney injury superimposed on CKD
Anemia
Acute kidney injury superimposed on CKD
Anemia
Anemia
Acute kidney injury superimposed on CKD

## 2023-11-10 NOTE — PROGRESS NOTE ADULT - PROBLEM SELECTOR PLAN 2
- Baseline Cr high 5s with GFR in 9-10; currently Cr 9.42 and eGFR 5, still w/ metabolic acidosis but improving w/ bicarb 16 from 13, Cr 9.42 from 9.88  - No sign of uremia despite BUN >100  - Per pt and daughter, since December 2022 pt has opted for conservative management for renal failure and is not interested in dialysis; he confirms this and understands that this may lead to death if patient's metabolic acidosis continues to worsen   - TTE w/ EF 65%, no concern for ADHF exacerbation   - C/w sodium bicarb to 1300 mg TID and sodium citrate 30 cc TID   - Given NS 50 cc/hr x 12 hours per nephro

## 2023-11-10 NOTE — PROGRESS NOTE ADULT - PROBLEM SELECTOR PLAN 1
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 On review of labs on [facility], last outpatient Scr was 5.7 on 9/12/23. Labs on admission elevated at 9.24 on 11/6 and remain elevated but decreased to 9.42 today. Pt follows with Dr. Holloway for care of his CKD. Pt has had extensive discussions with Dr. Holloway and does not want HD, only wants conservative management of CKD. UA with 300mg of protein, SG 1.014, trace blood. UPCR is 2.6. Recommend IV NS @ 50ccs/hr for additional 12 hours today. Monitor labs and urine output. Avoid nephrotoxins. Dose medications as per eGFR. 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 On review of labs on [facility], last outpatient Scr was 5.7 on 9/12/23. Labs on admission elevated at 9.24 on 11/6 and remain elevated but decreased to 9.42 today. Pt follows with Dr. Holloway for care of his CKD. Pt has had extensive discussions with Dr. Holloway and does not want HD, only wants conservative management of CKD. UA with 300mg of protein, SG 1.014, trace blood. UPCR is 2.6. Ok to discharge home from renal standpoint. Monitor labs and urine output. Avoid nephrotoxins. Dose medications as per eGFR.

## 2023-11-10 NOTE — PROGRESS NOTE ADULT - ATTENDING COMMENTS
a/w PNA- multilobar  son at bedside  #Jose on ckd vs ckd progression  cr continues to uptrend   ?atn component   check bladder scan to r/o retention  encourage po intake  hold ivf/ hold diuretics for now and allow pt to re-equilibrate on his own  pt has expressed to his outpt nephrologist Dr Holloway and to us since admission that he does not want dialysis if need arises, prefers medical and conservative treatment  #met acidosis-  increase bicarb tabs from 650mg tid to 1300mg po tid  #hyperkalemia  low k diet/renal diet  lokelma as needed  # abx for pna  dose per renal fxn, gfr <10  #anemia  check iron studies/ferritin  last hb as outtpt in 9s  MIRELLA EPO [id number] 11/7  # recommend pal care consult
a/w PNA- multilobar  son at bedside  #Jose on ckd vs ckd progression  possible atn component   cr downtrending today s/p gentle hydration last night  encourage po intake  pt has expressed to his outpt nephrologist Dr Holloway and to us since admission that he does not want dialysis if need arises, prefers medical and conservative treatment  #met acidosis-  increased bicarb tabs  1300mg po tid; on bicitra as well  bicarb level stable  #hyperkalemia  low k diet/renal diet  lokelma as needed  # abx for pna  dose per renal fxn, gfr <10  #anemia  iron def but high ferritin in setting of infection  no IV iron  MIRELLA EPO [id number] 11/7  # c/o left 'twinge' in big toe, feels like gout is coming- requesting allopurinol; would stay away from nsaids, colchicine, pred for now;   d/w dr leigh
a/w PNA- multilobar  son at bedside  #Jose on ckd vs ckd progression  possible atn component   cr stable today  encourage po intake  appears vol depleted slightly; gentle hydration  pt has expressed to his outpt nephrologist Dr Holloway and to us since admission that he does not want dialysis if need arises, prefers medical and conservative treatment  #met acidosis-  increased bicarb tabs  1300mg po tid  #hyperkalemia  low k diet/renal diet  lokelma as needed  # abx for pna  dose per renal fxn, gfr <10  #anemia  iron def but high ferritin in setting of infection  no IV iron  MIRELLA EPO [id number] 11/7  # recommend pal care/hospice follow up
83M with T2DM, HTN, HLD, CKD5, admitted for management of CAP and ERIC on CKD with high risk of progression to ESRD, however patient unequivocally states he does not want HD. Now showing signs of volume overload with some ascites developing    - PNA: cont ctx, anticipate 7 day course given ongoing low grade temps, can stop azithro as low suspicion for atypical pna. Weaned off o2  - ERIC on CKD: nephrology following, medical management only for now- cont sevelamer and bicarb PO. Gentle fluid trial. Possible dc home tomorrow if cleared by nephrology. Pt only interested in medical management.    Guarded prognosis- discussed with pt and son at bedside.   Pt is DNR/DNI  dispo: likely home- offered home hospice referral, patient considering moving to [place] so not ready for referral at this time. Will take info and call as OP as and when time comes.     The necessity of the time spent during the encounter on this date of service was due to:   - Ordering, reviewing, and interpreting labs, testing, and imaging.  - Independently obtaining a review of systems and performing a physical exam  - Reviewing prior hospitalization and where necessary, outpatient records.  - Counselling and educating patient and family regarding interpretation of aforementioned items and plan of care.    Time-based billing (NON-critical care). Total minutes spent: 78
83M with T2DM, HTN, HLD, CKD5, admitted for management of CAP and ERIC on CKD with high risk of progression to ESRD, however patient unequivocally states he does not want HD. Now showing signs of volume overload with some ascites developing    - PNA: cont ctx, anticipate 7 day course given ongoing low grade temps, can stop azithro as low suspicion for atypical pna. Weaned off o2  - ERIC on CKD: nephrology following, medical management only for now- cont sevelamer and bicarb PO. Slightly improved today.  - gout- had stopped allopurinol on admission, no active flare so will continue now, renally dosed. Per renal would not give colchicine.    Guarded prognosis- discussed with pt and son at bedside.   Pt is DNR/DNI  dispo: likely home- offered home hospice referral, patient considering moving to [place] so not ready for referral at this time. Will take info and call as OP as and when time comes.     pt cleared for dc home today- total discharge time: 45 minutes.
83M with T2DM, HTN, HLD, CKD5, admitted for management of CAP and ERIC on CKD with high risk of progression to ESRD, however patient unequivocally states he does not want HD. Now showing signs of volume overload with some ascites developing    - PNA: cont ctx, anticipate 5 day course, can stop azithro as low suspicion for atypical pna. Weaned off o2  - ERIC on CKD: nephrology following, medical management only for now- cont sevelamer and bicarb PO. Making urine still but may be becoming overloaded. Per discussion with nephro- no fluids or diuresis at this time.     Guarded prognosis- discussed with pt and son at bedside.   Pt is DNR/DNI    The necessity of the time spent during the encounter on this date of service was due to:   - Ordering, reviewing, and interpreting labs, testing, and imaging.  - Independently obtaining a review of systems and performing a physical exam  - Reviewing prior hospitalization and where necessary, outpatient records.  - Counselling and educating patient and family regarding interpretation of aforementioned items and plan of care.    Time-based billing (NON-critical care). Total minutes spent: 78

## 2023-11-10 NOTE — PROGRESS NOTE ADULT - PROVIDER SPECIALTY LIST ADULT
Nephrology-Cardiorenal
Internal Medicine
Nephrology-Cardiorenal
Internal Medicine
Nephrology-Cardiorenal
Internal Medicine
Internal Medicine

## 2023-11-10 NOTE — PROGRESS NOTE ADULT - PROBLEM SELECTOR PLAN 1
- Legionella neg, S. pneumo negative  - C/w CTX 11/6-11/12 versus switch to po medication; will do 7 day PNA course as pt has had intermittent low grade temps  - Duoneb prn for wheezing; tessalon pearls for cough prn  - Still w/ cough although now dry rather than productive - Legionella neg, S. pneumo negative  - C/w CTX 11/6-11/12 versus switch to po Cefpodoxime renally dosed; will do 7 day PNA course as pt has had intermittent low grade temps  - Duoneb prn for wheezing; tessalon pearls for cough prn  - Still w/ cough although now dry rather than productive

## 2023-11-10 NOTE — PROGRESS NOTE ADULT - PROBLEM SELECTOR PLAN 3
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

## 2023-11-10 NOTE — PROGRESS NOTE ADULT - PROBLEM SELECTOR PLAN 3
Pt with metabolic acidosis in the setting of advanced CKD. SCO2 improved but remains low at 16 today. Continue on oral sodium bicarbonate tablets at 1300mg TID and sodium citrate @ 30ccs TID. Monitor bicarb. Goal >22.

## 2023-11-10 NOTE — DISCHARGE NOTE NURSING/CASE MANAGEMENT/SOCIAL WORK - PATIENT PORTAL LINK FT
You can access the FollowMyHealth Patient Portal offered by [facility] by registering at the following website: [email or web address]. By joining [facility]’s FollowMyHealth portal, you will also be able to view your health information using other applications (apps) compatible with our system.

## 2023-11-10 NOTE — PROGRESS NOTE ADULT - ASSESSMENT
83M with T2DM, HTN, HLD, CKD5, admitted for management of CAP and ERIC on CKD (Cr 9-10 here, ~5 at baseline).  Pt on CTX for CAP, on day 5 of 7 day course. Pt prefers to go home once PNA symptoms improve/afebrile w/ possible hospice care, rather than pursue HD, is deciding whether he wants to stay local versus move to [place] and establish care with doctors there (daughter is hospice nurse). Acidosis and BUN/Cr slighlty improved.

## 2023-11-10 NOTE — MEDICAL STUDENT PROGRESS NOTE(EDUCATION) - PLAN 2
- Baseline Cr high 5s with current Cr 10.09 --> 9.88  - patient does not want HD  - s/p IV NS 50 cc/hr x 12 hr  - c/w sodium bicarbonate 1300 TID and sodium citrate 30 ccs TID with bicarb goal > 22 per nephro  - phosphorus 6.2 --> 5.9 on sevelamer

## 2023-11-10 NOTE — MEDICAL STUDENT PROGRESS NOTE(EDUCATION) - PLAN 3
- Normocytic anemia with high RDW, no acute bleed  - iron studies show high ferritin  - s/p retacrit injection yesterday  - Hb 7.1 today, consider transfusion if Hb <7 - Normocytic anemia with high RDW, no acute bleed  - iron studies show high ferritin so likely AOCD  - s/p retacrit injection yesterday  - Hb 7.1 today, consider transfusion if Hb <7

## 2023-11-10 NOTE — MEDICAL STUDENT PROGRESS NOTE(EDUCATION) - PLAN 1
- C/w ceftriaxone for 7 day course as the patient remains febrile and symptomatic  - Can transition to oral cefpodoxime q24h upon d/c  - legionella negative, strep pneumo negative  - c/w theresa PRN

## 2023-11-11 ENCOUNTER — TRANSCRIPTION ENCOUNTER (OUTPATIENT)
Age: 83
End: 2023-11-11

## 2023-11-13 ENCOUNTER — TRANSCRIPTION ENCOUNTER (OUTPATIENT)
Age: 83
End: 2023-11-13

## 2023-11-13 LAB
CULTURE RESULTS: SIGNIFICANT CHANGE UP
SPECIMEN SOURCE: SIGNIFICANT CHANGE UP

## 2023-11-13 RX ORDER — IPRATROPIUM/ALBUTEROL SULFATE 18-103MCG
3 AEROSOL WITH ADAPTER (GRAM) INHALATION
Qty: 60 | Refills: 0
Start: 2023-11-13 | End: 2023-11-27

## 2023-11-13 RX ORDER — TORSEMIDE 10 MG/1
10 TABLET ORAL
Refills: 0 | Status: DISCONTINUED | COMMUNITY
End: 2023-11-13

## 2023-11-13 RX ORDER — ALLOPURINOL 100 MG/1
100 TABLET ORAL WEEKLY
Refills: 3 | Status: ACTIVE | COMMUNITY
Start: 2019-07-29

## 2023-11-13 RX ORDER — SEVELAMER HYDROCHLORIDE 800 MG/1
800 TABLET, FILM COATED ORAL 3 TIMES DAILY
Refills: 0 | Status: ACTIVE | COMMUNITY
Start: 2023-11-13

## 2023-11-14 ENCOUNTER — APPOINTMENT (OUTPATIENT)
Dept: PULMONOLOGY | Facility: CLINIC | Age: 83
End: 2023-11-14
Payer: MEDICARE

## 2023-11-14 ENCOUNTER — APPOINTMENT (OUTPATIENT)
Dept: NEPHROLOGY | Facility: CLINIC | Age: 83
End: 2023-11-14
Payer: MEDICARE

## 2023-11-14 VITALS
TEMPERATURE: 97.5 F | WEIGHT: 156 LBS | HEIGHT: 69 IN | OXYGEN SATURATION: 95 % | HEART RATE: 80 BPM | BODY MASS INDEX: 23.11 KG/M2 | DIASTOLIC BLOOD PRESSURE: 57 MMHG | SYSTOLIC BLOOD PRESSURE: 145 MMHG

## 2023-11-14 DIAGNOSIS — J90 PLEURAL EFFUSION, NOT ELSEWHERE CLASSIFIED: ICD-10-CM

## 2023-11-14 DIAGNOSIS — R06.2 WHEEZING: ICD-10-CM

## 2023-11-14 DIAGNOSIS — J18.9 PNEUMONIA, UNSPECIFIED ORGANISM: ICD-10-CM

## 2023-11-14 PROCEDURE — 96372 THER/PROPH/DIAG INJ SC/IM: CPT

## 2023-11-14 PROCEDURE — 99205 OFFICE O/P NEW HI 60 MIN: CPT | Mod: 95

## 2023-11-14 PROCEDURE — 99214 OFFICE O/P EST MOD 30 MIN: CPT | Mod: 25

## 2023-11-14 RX ORDER — DARBEPOETIN ALFA 100 UG/ML
100 SOLUTION INTRAVENOUS; SUBCUTANEOUS
Refills: 0 | Status: COMPLETED | OUTPATIENT
Start: 2023-11-14

## 2023-11-14 RX ADMIN — DARBEPOETIN ALFA 1 MCG/ML: 100 SOLUTION INTRAVENOUS; SUBCUTANEOUS at 00:00

## 2023-11-15 LAB
ALBUMIN SERPL ELPH-MCNC: 3.3 G/DL
ANION GAP SERPL CALC-SCNC: 17 MMOL/L
APPEARANCE: CLEAR
BACTERIA: NEGATIVE /HPF
BILIRUBIN URINE: NEGATIVE
BLOOD URINE: NEGATIVE
BUN SERPL-MCNC: 89 MG/DL
CALCIUM SERPL-MCNC: 8.5 MG/DL
CAST: 1 /LPF
CHLORIDE SERPL-SCNC: 106 MMOL/L
CO2 SERPL-SCNC: 20 MMOL/L
COLOR: YELLOW
CREAT SERPL-MCNC: 7.62 MG/DL
CREAT SPEC-SCNC: 108 MG/DL
EGFR: 7 ML/MIN/1.73M2
EPITHELIAL CELLS: 1 /HPF
FERRITIN SERPL-MCNC: 1072 NG/ML
GLUCOSE QUALITATIVE U: NEGATIVE MG/DL
GLUCOSE SERPL-MCNC: 160 MG/DL
HCT VFR BLD CALC: 24.8 %
HGB BLD-MCNC: 7.8 G/DL
IRON SATN MFR SERPL: 13 %
IRON SERPL-MCNC: 25 UG/DL
KETONES URINE: NEGATIVE MG/DL
LEUKOCYTE ESTERASE URINE: NEGATIVE
MAGNESIUM SERPL-MCNC: 2.5 MG/DL
MCHC RBC-ENTMCNC: 29.2 PG
MCHC RBC-ENTMCNC: 31.5 GM/DL
MCV RBC AUTO: 92.9 FL
MICROALBUMIN 24H UR DL<=1MG/L-MCNC: 252.7 MG/DL
MICROALBUMIN/CREAT 24H UR-RTO: 2348 MG/G
MICROSCOPIC-UA: NORMAL
NITRITE URINE: NEGATIVE
PH URINE: 6.5
PHOSPHATE SERPL-MCNC: 5.4 MG/DL
PLATELET # BLD AUTO: 293 K/UL
POTASSIUM SERPL-SCNC: 4.5 MMOL/L
PROTEIN URINE: 300 MG/DL
RBC # BLD: 2.67 M/UL
RBC # FLD: 15.8 %
RED BLOOD CELLS URINE: 2 /HPF
SODIUM SERPL-SCNC: 144 MMOL/L
SPECIFIC GRAVITY URINE: 1.02
TIBC SERPL-MCNC: 199 UG/DL
UIBC SERPL-MCNC: 174 UG/DL
URATE SERPL-MCNC: 7.2 MG/DL
UROBILINOGEN URINE: 0.2 MG/DL
WBC # FLD AUTO: 7.5 K/UL
WHITE BLOOD CELLS URINE: 1 /HPF

## 2023-11-16 ENCOUNTER — TRANSCRIPTION ENCOUNTER (OUTPATIENT)
Age: 83
End: 2023-11-16

## 2023-11-21 ENCOUNTER — APPOINTMENT (OUTPATIENT)
Dept: INTERNAL MEDICINE | Facility: CLINIC | Age: 83
End: 2023-11-21
Payer: MEDICARE

## 2023-11-21 VITALS
TEMPERATURE: 98.6 F | WEIGHT: 153 LBS | SYSTOLIC BLOOD PRESSURE: 136 MMHG | OXYGEN SATURATION: 97 % | DIASTOLIC BLOOD PRESSURE: 70 MMHG | BODY MASS INDEX: 22.66 KG/M2 | HEART RATE: 63 BPM | HEIGHT: 69 IN

## 2023-11-21 DIAGNOSIS — M79.2 NEURALGIA AND NEURITIS, UNSPECIFIED: ICD-10-CM

## 2023-11-21 PROCEDURE — 99495 TRANSJ CARE MGMT MOD F2F 14D: CPT

## 2023-11-21 RX ORDER — LEVOCETIRIZINE DIHYDROCHLORIDE 5 MG/1
5 TABLET ORAL DAILY
Qty: 30 | Refills: 11 | Status: DISCONTINUED | COMMUNITY
Start: 2023-09-18 | End: 2023-11-21

## 2023-11-27 LAB
ALBUMIN SERPL ELPH-MCNC: 3.6 G/DL
ALP BLD-CCNC: 91 U/L
ALT SERPL-CCNC: 12 U/L
ANION GAP SERPL CALC-SCNC: 14 MMOL/L
AST SERPL-CCNC: 19 U/L
BASOPHILS # BLD AUTO: 0.08 K/UL
BASOPHILS NFR BLD AUTO: 1 %
BILIRUB SERPL-MCNC: <0.2 MG/DL
BUN SERPL-MCNC: 58 MG/DL
CALCIUM SERPL-MCNC: 9 MG/DL
CHLORIDE SERPL-SCNC: 108 MMOL/L
CO2 SERPL-SCNC: 24 MMOL/L
CREAT SERPL-MCNC: 6.66 MG/DL
EGFR: 8 ML/MIN/1.73M2
EOSINOPHIL # BLD AUTO: 0.14 K/UL
EOSINOPHIL NFR BLD AUTO: 1.8 %
ESTIMATED AVERAGE GLUCOSE: 114 MG/DL
FOLATE SERPL-MCNC: 14 NG/ML
GLUCOSE SERPL-MCNC: 141 MG/DL
HBA1C MFR BLD HPLC: 5.6 %
HCT VFR BLD CALC: 27.6 %
HGB BLD-MCNC: 8.1 G/DL
IMM GRANULOCYTES NFR BLD AUTO: 0.7 %
IRON SERPL-MCNC: 42 UG/DL
LYMPHOCYTES # BLD AUTO: 0.95 K/UL
LYMPHOCYTES NFR BLD AUTO: 12.4 %
MAN DIFF?: NORMAL
MCHC RBC-ENTMCNC: 29 PG
MCHC RBC-ENTMCNC: 29.3 GM/DL
MCV RBC AUTO: 98.9 FL
MONOCYTES # BLD AUTO: 0.47 K/UL
MONOCYTES NFR BLD AUTO: 6.1 %
NEUTROPHILS # BLD AUTO: 5.96 K/UL
NEUTROPHILS NFR BLD AUTO: 78 %
PLATELET # BLD AUTO: 301 K/UL
POTASSIUM SERPL-SCNC: 4.7 MMOL/L
PROT SERPL-MCNC: 6.5 G/DL
RBC # BLD: 2.79 M/UL
RBC # FLD: 17.2 %
SODIUM SERPL-SCNC: 145 MMOL/L
VIT B12 SERPL-MCNC: 1851 PG/ML
WBC # FLD AUTO: 7.65 K/UL

## 2023-11-28 ENCOUNTER — TRANSCRIPTION ENCOUNTER (OUTPATIENT)
Age: 83
End: 2023-11-28

## 2023-11-28 ENCOUNTER — NON-APPOINTMENT (OUTPATIENT)
Age: 83
End: 2023-11-28

## 2023-11-28 ENCOUNTER — APPOINTMENT (OUTPATIENT)
Dept: RADIOLOGY | Facility: IMAGING CENTER | Age: 83
End: 2023-11-28
Payer: MEDICARE

## 2023-11-28 ENCOUNTER — OUTPATIENT (OUTPATIENT)
Dept: OUTPATIENT SERVICES | Facility: HOSPITAL | Age: 83
LOS: 1 days | End: 2023-11-28
Payer: MEDICARE

## 2023-11-28 DIAGNOSIS — J18.9 PNEUMONIA, UNSPECIFIED ORGANISM: ICD-10-CM

## 2023-11-28 PROBLEM — M79.2 LEG NEURALGIA: Status: ACTIVE | Noted: 2023-11-28

## 2023-11-28 PROCEDURE — 71046 X-RAY EXAM CHEST 2 VIEWS: CPT | Mod: 26

## 2023-11-28 PROCEDURE — 71046 X-RAY EXAM CHEST 2 VIEWS: CPT

## 2023-12-01 ENCOUNTER — APPOINTMENT (OUTPATIENT)
Dept: NEPHROLOGY | Facility: CLINIC | Age: 83
End: 2023-12-01
Payer: MEDICARE

## 2023-12-01 VITALS
BODY MASS INDEX: 23.02 KG/M2 | DIASTOLIC BLOOD PRESSURE: 89 MMHG | OXYGEN SATURATION: 96 % | SYSTOLIC BLOOD PRESSURE: 198 MMHG | WEIGHT: 155.42 LBS | HEART RATE: 74 BPM | TEMPERATURE: 98 F | HEIGHT: 69 IN

## 2023-12-01 VITALS — SYSTOLIC BLOOD PRESSURE: 170 MMHG | DIASTOLIC BLOOD PRESSURE: 80 MMHG

## 2023-12-01 PROCEDURE — 99213 OFFICE O/P EST LOW 20 MIN: CPT

## 2023-12-11 ENCOUNTER — TRANSCRIPTION ENCOUNTER (OUTPATIENT)
Age: 83
End: 2023-12-11

## 2023-12-12 ENCOUNTER — APPOINTMENT (OUTPATIENT)
Dept: NEPHROLOGY | Facility: CLINIC | Age: 83
End: 2023-12-12
Payer: MEDICARE

## 2023-12-12 ENCOUNTER — RX RENEWAL (OUTPATIENT)
Age: 83
End: 2023-12-12

## 2023-12-12 VITALS
WEIGHT: 153.22 LBS | OXYGEN SATURATION: 94 % | TEMPERATURE: 97.5 F | DIASTOLIC BLOOD PRESSURE: 68 MMHG | BODY MASS INDEX: 22.69 KG/M2 | HEIGHT: 69 IN | HEART RATE: 62 BPM | SYSTOLIC BLOOD PRESSURE: 167 MMHG

## 2023-12-12 DIAGNOSIS — N17.9 ACUTE KIDNEY FAILURE, UNSPECIFIED: ICD-10-CM

## 2023-12-12 PROCEDURE — 96372 THER/PROPH/DIAG INJ SC/IM: CPT

## 2023-12-12 PROCEDURE — 99214 OFFICE O/P EST MOD 30 MIN: CPT | Mod: 25

## 2023-12-12 RX ORDER — DOXAZOSIN 4 MG/1
4 TABLET ORAL
Qty: 90 | Refills: 1 | Status: DISCONTINUED | COMMUNITY
Start: 2022-04-07 | End: 2023-12-12

## 2023-12-12 RX ORDER — DARBEPOETIN ALFA 60 UG/ML
60 SOLUTION INTRAVENOUS; SUBCUTANEOUS
Refills: 0 | Status: COMPLETED | OUTPATIENT
Start: 2023-12-12

## 2023-12-12 RX ORDER — DOXAZOSIN 8 MG/1
8 TABLET ORAL
Qty: 90 | Refills: 3 | Status: ACTIVE | COMMUNITY
Start: 2021-06-23 | End: 1900-01-01

## 2023-12-12 RX ORDER — GABAPENTIN 100 MG/1
100 CAPSULE ORAL
Qty: 30 | Refills: 0 | Status: DISCONTINUED | COMMUNITY
Start: 2023-11-28 | End: 2023-12-12

## 2023-12-12 RX ORDER — FINASTERIDE 5 MG/1
5 TABLET, FILM COATED ORAL
Qty: 90 | Refills: 3 | Status: ACTIVE | COMMUNITY
Start: 2022-04-26 | End: 1900-01-01

## 2023-12-12 RX ADMIN — DARBEPOETIN ALFA 0 MCG/ML: 60 SOLUTION INTRAVENOUS; SUBCUTANEOUS at 00:00

## 2023-12-13 ENCOUNTER — APPOINTMENT (OUTPATIENT)
Dept: INTERNAL MEDICINE | Facility: CLINIC | Age: 83
End: 2023-12-13
Payer: MEDICARE

## 2023-12-13 VITALS
OXYGEN SATURATION: 98 % | SYSTOLIC BLOOD PRESSURE: 158 MMHG | TEMPERATURE: 98.2 F | HEIGHT: 69 IN | DIASTOLIC BLOOD PRESSURE: 72 MMHG | BODY MASS INDEX: 22.51 KG/M2 | WEIGHT: 152 LBS | HEART RATE: 60 BPM

## 2023-12-13 DIAGNOSIS — J18.9 PNEUMONIA, UNSPECIFIED ORGANISM: ICD-10-CM

## 2023-12-13 DIAGNOSIS — N13.8 BENIGN PROSTATIC HYPERPLASIA WITH LOWER URINARY TRACT SYMPMS: ICD-10-CM

## 2023-12-13 DIAGNOSIS — H35.30 UNSPECIFIED MACULAR DEGENERATION: ICD-10-CM

## 2023-12-13 DIAGNOSIS — N40.1 BENIGN PROSTATIC HYPERPLASIA WITH LOWER URINARY TRACT SYMPMS: ICD-10-CM

## 2023-12-13 PROCEDURE — 99214 OFFICE O/P EST MOD 30 MIN: CPT | Mod: 25

## 2023-12-13 RX ORDER — BUDESONIDE AND FORMOTEROL FUMARATE DIHYDRATE 80; 4.5 UG/1; UG/1
80-4.5 AEROSOL RESPIRATORY (INHALATION) TWICE DAILY
Qty: 1 | Refills: 0 | Status: DISCONTINUED | COMMUNITY
Start: 2023-11-14 | End: 2023-12-13

## 2023-12-13 RX ORDER — ALBUTEROL SULFATE 90 UG/1
108 (90 BASE) INHALANT RESPIRATORY (INHALATION) EVERY 4 HOURS
Qty: 1 | Refills: 5 | Status: DISCONTINUED | COMMUNITY
Start: 2023-11-14 | End: 2023-12-13

## 2023-12-13 RX ORDER — IPRATROPIUM BROMIDE AND ALBUTEROL SULFATE 2.5; .5 MG/3ML; MG/3ML
0.5-2.5 (3) SOLUTION RESPIRATORY (INHALATION)
Qty: 1 | Refills: 5 | Status: DISCONTINUED | COMMUNITY
Start: 2023-11-14 | End: 2023-12-13

## 2023-12-13 NOTE — REVIEW OF SYSTEMS
[Fatigue] : fatigue [Vision Problems] : vision problems [Nocturia] : nocturia [Muscle Pain] : muscle pain [Negative] : Heme/Lymph

## 2023-12-13 NOTE — HISTORY OF PRESENT ILLNESS
[FreeTextEntry8] : States that he is improving in all respects.  Presently declining move to [place]. Reports that lower extremity discomfort persists but has lessened.  It is tolerable.  It resolved on gabapentin.  However he had side effects from the medication.  He discontinued it and states that he presently does not want any medication for treatment.  He reports that he had mild lower extremity edema that has resolved with increased torsemide dosing.  He denies any calf pain.  He is using MiraLAX and has normal bowel movements.  He reports good urination.  His appetite has improved.  His fatigue has lessened.  His stamina is improving.  He denies any chest pain.  He denies any shortness of breath or wheezing.  He states that his cough has resolved.  He is having issues with hypertension.  Because of this he has not been able to receive Aranesp injection.  His blood pressure medications have been increased by Dr. Holloway.  He offers no other complaints today.

## 2023-12-13 NOTE — ASSESSMENT
[FreeTextEntry1] : Community-acquired pneumonia = right upper lobe Initially given moxifloxacin with no response When hospitalized treated with IV cefuroxime and Zithromax Then switched to cefpodoxime Has completed course Clinically improved with resolution of cough and fevers Follow-up chest x-ray markedly improved To do follow-up chest x-ray to ensure resolution of infiltrate = prescription given Strict  I and O///daily weight On increased diuretic therapy He refuses MDI or nebulizer therapy Can follow-up with pulmonary  Stage V CKD Superimposed ERIC at time of admission Creatinine slightly improved Nephrology follow-up Monitor BMP He continues to refuse dialysis On allopurinol On vitamin D On sodium bicarbonate Contemplating move to [place]/hospice care  Severe anemia with iron deficiency Monitor CBC and iron level On iron supplementation/ vitamin C Receiving Procrit  Diabetes Has been well-controlled Monitor A1c level Home glucose monitoring Continue ADA diet Continue Januvia  Hypertension Blood pressure high Continue amlodipine and atenolol Hydralazine added to regimen by Dr. Holloway Watch salt in diet Cardiology f/u Monitor echo  BPH On finasteride On increased dose of doxazoin as per renal Urology f/u as needed  Macular degeneration Continue Latanoprost, AREDS, MVI  He will see me in follow-up in 6 weeks and as needed He will call if his status worsens or does not improve or for any new medical/pulmonary issues and return to be seen immediately All of the above was discussed in detail with him and all of his questions were answered He verbally confirmed understanding of all of the above and agreement with the above plan

## 2023-12-13 NOTE — PHYSICAL EXAM
[No Acute Distress] : no acute distress [Well-Appearing] : well-appearing [Normal Voice/Communication] : normal voice/communication [Normal Sclera/Conjunctiva] : normal sclera/conjunctiva [PERRL] : pupils equal round and reactive to light [EOMI] : extraocular movements intact [Normal Outer Ear/Nose] : the outer ears and nose were normal in appearance [Normal Oropharynx] : the oropharynx was normal [Supple] : supple [No Respiratory Distress] : no respiratory distress  [No Accessory Muscle Use] : no accessory muscle use [Clear to Auscultation] : lungs were clear to auscultation bilaterally [Normal Rate] : normal rate  [Regular Rhythm] : with a regular rhythm [Normal S1, S2] : normal S1 and S2 [No Carotid Bruits] : no carotid bruits [No Edema] : there was no peripheral edema [No Extremity Clubbing/Cyanosis] : no extremity clubbing/cyanosis [Declined Breast Exam] : declined breast exam  [Soft] : abdomen soft [Normal Bowel Sounds] : normal bowel sounds [Declined Rectal Exam] : declined rectal exam [No CVA Tenderness] : no CVA  tenderness [No Spinal Tenderness] : no spinal tenderness [No Rash] : no rash [No Focal Deficits] : no focal deficits [Normal Gait] : normal gait [Speech Grossly Normal] : speech grossly normal [Normal Affect] : the affect was normal [Alert and Oriented x3] : oriented to person, place, and time [de-identified] : Thin [de-identified] : Wearing glasses [de-identified] : No stridor [de-identified] : R = 16; no cough noted; good airflow; no wheezing [de-identified] : Loud murmur unchanged [de-identified] : Normal bilateral radial pulses; no cords [de-identified] : Declined

## 2023-12-13 NOTE — HEALTH RISK ASSESSMENT
[No] : In the past 12 months have you used drugs other than those required for medical reasons? No [No falls in past year] : Patient reported no falls in the past year [Patient refused screening] : Patient refused screening [de-identified] : None presently [de-identified] :  nephrology [de-identified] : Low-fat, low-salt, ADA, renal [Former] : Former

## 2023-12-14 LAB
ALBUMIN SERPL ELPH-MCNC: 3.8 G/DL
ANION GAP SERPL CALC-SCNC: 14 MMOL/L
BASOPHILS # BLD AUTO: 0.06 K/UL
BASOPHILS NFR BLD AUTO: 1.3 %
BUN SERPL-MCNC: 64 MG/DL
CALCIUM SERPL-MCNC: 8.6 MG/DL
CHLORIDE SERPL-SCNC: 104 MMOL/L
CO2 SERPL-SCNC: 22 MMOL/L
CREAT SERPL-MCNC: 7.56 MG/DL
EGFR: 7 ML/MIN/1.73M2
EOSINOPHIL # BLD AUTO: 0.16 K/UL
EOSINOPHIL NFR BLD AUTO: 3.6 %
GLUCOSE SERPL-MCNC: 135 MG/DL
HCT VFR BLD CALC: 27.8 %
HGB BLD-MCNC: 8.3 G/DL
IMM GRANULOCYTES NFR BLD AUTO: 0.2 %
LYMPHOCYTES # BLD AUTO: 1.06 K/UL
LYMPHOCYTES NFR BLD AUTO: 23.6 %
MAN DIFF?: NORMAL
MCHC RBC-ENTMCNC: 28.8 PG
MCHC RBC-ENTMCNC: 29.9 GM/DL
MCV RBC AUTO: 96.5 FL
MONOCYTES # BLD AUTO: 0.39 K/UL
MONOCYTES NFR BLD AUTO: 8.7 %
NEUTROPHILS # BLD AUTO: 2.82 K/UL
NEUTROPHILS NFR BLD AUTO: 62.6 %
PHOSPHATE SERPL-MCNC: 4.4 MG/DL
PLATELET # BLD AUTO: 166 K/UL
POTASSIUM SERPL-SCNC: 4.4 MMOL/L
RBC # BLD: 2.88 M/UL
RBC # FLD: 17.4 %
SODIUM SERPL-SCNC: 140 MMOL/L
WBC # FLD AUTO: 4.5 K/UL

## 2023-12-20 ENCOUNTER — NON-APPOINTMENT (OUTPATIENT)
Age: 83
End: 2023-12-20

## 2023-12-20 ENCOUNTER — APPOINTMENT (OUTPATIENT)
Dept: CARDIOLOGY | Facility: CLINIC | Age: 83
End: 2023-12-20
Payer: MEDICARE

## 2023-12-20 VITALS
HEART RATE: 64 BPM | OXYGEN SATURATION: 96 % | WEIGHT: 152 LBS | SYSTOLIC BLOOD PRESSURE: 160 MMHG | BODY MASS INDEX: 22.45 KG/M2 | DIASTOLIC BLOOD PRESSURE: 64 MMHG

## 2023-12-20 PROCEDURE — 99214 OFFICE O/P EST MOD 30 MIN: CPT

## 2023-12-20 PROCEDURE — 93000 ELECTROCARDIOGRAM COMPLETE: CPT

## 2023-12-20 NOTE — DISCUSSION/SUMMARY
[FreeTextEntry1] : EKG today shows:  Sinus Rhythm at 63 bpm.  IVCD, otherwise unremarkable; no significant change.  PLAN: 1.  HTN, with mild concentric LVH and mild diastolic LV dysfunction - BP in acceptable range today.  - continue current meds.  2.  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 - continue afterload reduction with hydralazine. - continue diuretic at current dose.    3.  Palpitations - remain quiescent at this time.  33 minutes spent on today's office visit.   He will return for a f/u visit in 6 months. [EKG obtained to assist in diagnosis and management of assessed problem(s)] : EKG obtained to assist in diagnosis and management of assessed problem(s)

## 2023-12-20 NOTE — HISTORY OF PRESENT ILLNESS
[FreeTextEntry1] : He has a hx. of HTN and HLD as well as DM.  Cardiac history has also included palpitations.  Stress testing with isotope imaging in Aug. 2014 revealed that he walked for 9-1/2 minutes on a [place] protocol to a heart rate of 122 bpm.  Perfusion images were unremarkable; left ventricular ejection fraction was 62%.  He history of significant renal insufficiency and continues to see Dr. Pam Holloway.   He was hospitalized last month for community-acquired pneumonia.  Acute exacerbation of his chronic renal insufficiency was noted, with a creatinine greater than 9 on admission; at baseline it was 5.  He was treated with antibiotics.  He was seen by the nephrology service and, at his preference, continued conservative management; he has declined dialysis.  An echocardiogram was performed which showed preserved LV systolic function with an EF of 65%.  Mild to moderate mitral, aortic and tricuspid valve insufficiency were seen.  Pulmonary artery pressure was estimated to be 58 mmHg.  He was also more anemic as a dose of erythropoietin had been missed; he has since been treated with Aranesp.  As I see him today, he reports that he is gradually improving.  He offers no cardiac or respiratory symptoms.  There have been no episodes of chest discomfort or palpitations.  He describes no dyspnea, orthopnea or PND.  He does not yet feel that he has all of his usual stamina back.  He continues to follow-up with Dr. Baca and with Dr. Holloway; blood pressure medicines were recently adjusted.

## 2023-12-20 NOTE — ASSESSMENT
[FreeTextEntry1] : =================================== HTN, with mild concentric LVH and mild diastolic LV dysfunction.  Mild mitral valve insufficiency, mild aortic valve insufficiency. Mild tricuspid and pulmonic valve insufficiency.  Hx. of palpitations  CRI  DM  HLD

## 2023-12-20 NOTE — PHYSICAL EXAM
[Well Developed] : well developed [Well Nourished] : well nourished [No Acute Distress] : no acute distress [Normal Conjunctiva] : normal conjunctiva [Normal Venous Pressure] : normal venous pressure [No Carotid Bruit] : no carotid bruit [No Murmur] : no murmur [No Gallop] : no gallop [No Rub] : no rub [Clear Lung Fields] : clear lung fields [Good Air Entry] : good air entry [No Respiratory Distress] : no respiratory distress  [Soft] : abdomen soft [Non Tender] : non-tender [No Masses/organomegaly] : no masses/organomegaly [Normal Gait] : normal gait [Normal Bowel Sounds] : normal bowel sounds [No Edema] : no edema [No Cyanosis] : no cyanosis [No Clubbing] : no clubbing [No Varicosities] : no varicosities [No Rash] : no rash [No Skin Lesions] : no skin lesions [Moves all extremities] : moves all extremities [No Focal Deficits] : no focal deficits [Normal Speech] : normal speech [Normal memory] : normal memory [Alert and Oriented] : alert and oriented [de-identified] : Normal S1 and S2; prominent S2 split.  1-2/6 early diastolic blow best heart at upper LSB.

## 2024-01-04 RX ORDER — HYDRALAZINE HYDROCHLORIDE 25 MG/1
25 TABLET ORAL
Qty: 360 | Refills: 3 | Status: ACTIVE | COMMUNITY
Start: 1900-01-01 | End: 1900-01-01

## 2024-01-07 ENCOUNTER — NON-APPOINTMENT (OUTPATIENT)
Age: 84
End: 2024-01-07

## 2024-01-09 ENCOUNTER — LABORATORY RESULT (OUTPATIENT)
Age: 84
End: 2024-01-09

## 2024-01-12 ENCOUNTER — APPOINTMENT (OUTPATIENT)
Dept: NEPHROLOGY | Facility: CLINIC | Age: 84
End: 2024-01-12
Payer: MEDICARE

## 2024-01-12 VITALS
HEART RATE: 66 BPM | HEIGHT: 69 IN | OXYGEN SATURATION: 96 % | WEIGHT: 158 LBS | DIASTOLIC BLOOD PRESSURE: 67 MMHG | BODY MASS INDEX: 23.4 KG/M2 | SYSTOLIC BLOOD PRESSURE: 143 MMHG | TEMPERATURE: 97.6 F

## 2024-01-12 DIAGNOSIS — Z71.89 OTHER SPECIFIED COUNSELING: ICD-10-CM

## 2024-01-12 DIAGNOSIS — R80.9 PROTEINURIA, UNSPECIFIED: ICD-10-CM

## 2024-01-12 DIAGNOSIS — D50.9 IRON DEFICIENCY ANEMIA, UNSPECIFIED: ICD-10-CM

## 2024-01-12 PROCEDURE — 96372 THER/PROPH/DIAG INJ SC/IM: CPT

## 2024-01-12 PROCEDURE — 99214 OFFICE O/P EST MOD 30 MIN: CPT | Mod: 25

## 2024-01-12 RX ORDER — TORSEMIDE 20 MG/1
20 TABLET ORAL
Qty: 45 | Refills: 11 | Status: ACTIVE | COMMUNITY
Start: 2023-12-01 | End: 1900-01-01

## 2024-01-12 RX ORDER — SODIUM BICARBONATE 650 MG/1
650 TABLET ORAL
Qty: 999 | Refills: 0 | Status: ACTIVE | COMMUNITY
Start: 2023-11-13 | End: 1900-01-01

## 2024-01-12 RX ORDER — DARBEPOETIN ALFA 60 UG/ML
60 SOLUTION INTRAVENOUS; SUBCUTANEOUS
Qty: 0 | Refills: 0 | Status: COMPLETED | OUTPATIENT
Start: 2024-01-12

## 2024-01-12 RX ADMIN — DARBEPOETIN ALFA 1 MCG/ML: 60 SOLUTION INTRAVENOUS; SUBCUTANEOUS at 00:00

## 2024-01-13 PROBLEM — Z71.89 GOALS OF CARE, COUNSELING/DISCUSSION: Status: ACTIVE | Noted: 2024-01-13

## 2024-01-13 PROBLEM — D50.9 ANEMIA, IRON DEFICIENCY: Status: ACTIVE | Noted: 2023-11-15

## 2024-01-13 RX ORDER — DORZOLAMIDE HYDROCHLORIDE TIMOLOL MALEATE 20; 5 MG/ML; MG/ML
2-0.5 SOLUTION/ DROPS OPHTHALMIC
Qty: 10 | Refills: 0 | Status: ACTIVE | COMMUNITY
Start: 2023-12-27

## 2024-01-13 NOTE — HISTORY OF PRESENT ILLNESS
[FreeTextEntry1] : JCARLOS PARIKH is an 83-year old male with a history pf CKD5, long standing HTN, DM here for followup CKD5 ERIC and anemia. Here today with his daughtr from [place] who works for hospice in [place]. Pt has decided against moving to [place]. He does not want to make big changes at this time.  Last week pt with c/o increased swelling and SOB. Torsemide doubled x 2 days with relief of symptoms. Pt self dc'd sodium bicarb and sevelamer for fears it was causing the swelling and SOB.  Labs done on 1/9.  Amlodipine 5mg BID Atenolol 50mg BID On Sodium bicarb 2 tabs BID Doxazosin 12 mg total at bedtime Finasteride 5mg at bedtime Appetite fair.

## 2024-01-13 NOTE — ASSESSMENT
[FreeTextEntry1] : CKD5: Renal function progressive. Creatinine in 10s.Confirmed with patient he is certain he does not want dialysis. Opting for medical management of his CKD.   Volume status: Increase Torsemide to 40mg alternating with 20mg daily.    HTN: BP acceptable.  Amlodipine is 5mg BID as well as Atenolol 50mg BID. doxazosin at 16mg, hydralazine 50mg BID. Continue home monitoring.   Anemia: Aranesp 60 given SQ x 1 today. Iron studies added.   Metabolic acidosis Resume sodium bicarb 2 pill BID  Hyperphosphatemia. Hyperphosphatemia in the setting of acute renal failure. Continue on Sevelamer 800mg TID with meals. Monitor phos levels.   Constipation: may take colace, miralax and senekot as directed.   Pt educated on uremic symptoms. Discussed hospice evaluation at length today. Hospice care highly encouraged. Pt refusing at this time.  Labs in 2 weeks.

## 2024-01-13 NOTE — REVIEW OF SYSTEMS
[Feeling Tired] : feeling tired [Lower Ext Edema] : lower extremity edema [As Noted in HPI] : as noted in HPI [Shortness Of Breath] : shortness of breath [Negative] : Heme/Lymph [FreeTextEntry7] : miralax

## 2024-01-13 NOTE — PHYSICAL EXAM
[General Appearance - Alert] : alert [General Appearance - In No Acute Distress] : in no acute distress [Sclera] : the sclera and conjunctiva were normal [Outer Ear] : the ears and nose were normal in appearance [Neck Appearance] : the appearance of the neck was normal [Bibasilar Rales/Crackles] : bibasilar rales [Heart Rate And Rhythm] : heart rate was normal and rhythm regular [Heart Sounds] : normal S1 and S2 [Heart Sounds Pericardial Friction Rub] : no pericardial rub [___ +] : bilateral [unfilled]+ pitting edema to the ankles [Bowel Sounds] : normal bowel sounds [Abdomen Soft] : soft [FreeTextEntry1] : No asterixis noted [Involuntary Movements] : no involuntary movements were seen [] : no rash [No Focal Deficits] : no focal deficits [Oriented To Time, Place, And Person] : oriented to person, place, and time [Impaired Insight] : insight and judgment were intact [Affect] : the affect was normal

## 2024-01-16 ENCOUNTER — APPOINTMENT (OUTPATIENT)
Dept: INTERNAL MEDICINE | Facility: CLINIC | Age: 84
End: 2024-01-16
Payer: MEDICARE

## 2024-01-16 DIAGNOSIS — N18.5 CHRONIC KIDNEY DISEASE, STAGE 5: ICD-10-CM

## 2024-01-16 DIAGNOSIS — R80.9 TYPE 2 DIABETES MELLITUS WITH OTHER DIABETIC KIDNEY COMPLICATION: ICD-10-CM

## 2024-01-16 DIAGNOSIS — N18.9 CHRONIC KIDNEY DISEASE, UNSPECIFIED: ICD-10-CM

## 2024-01-16 DIAGNOSIS — R06.02 SHORTNESS OF BREATH: ICD-10-CM

## 2024-01-16 DIAGNOSIS — E11.29 TYPE 2 DIABETES MELLITUS WITH OTHER DIABETIC KIDNEY COMPLICATION: ICD-10-CM

## 2024-01-16 DIAGNOSIS — D63.1 CHRONIC KIDNEY DISEASE, UNSPECIFIED: ICD-10-CM

## 2024-01-16 PROCEDURE — 99441: CPT | Mod: 93

## 2024-01-17 LAB
FERRITIN SERPL-MCNC: 497 NG/ML
IRON SATN MFR SERPL: 13 %
IRON SERPL-MCNC: 26 UG/DL
TIBC SERPL-MCNC: 210 UG/DL
UIBC SERPL-MCNC: 184 UG/DL

## 2024-02-06 ENCOUNTER — RX RENEWAL (OUTPATIENT)
Age: 84
End: 2024-02-06

## 2024-02-06 RX ORDER — ATENOLOL 50 MG/1
50 TABLET ORAL TWICE DAILY
Qty: 180 | Refills: 3 | Status: ACTIVE | COMMUNITY
Start: 2018-05-21 | End: 1900-01-01

## 2024-02-07 ENCOUNTER — APPOINTMENT (OUTPATIENT)
Dept: INTERNAL MEDICINE | Facility: CLINIC | Age: 84
End: 2024-02-07

## 2024-02-16 ENCOUNTER — APPOINTMENT (OUTPATIENT)
Dept: NEPHROLOGY | Facility: CLINIC | Age: 84
End: 2024-02-16

## 2024-03-01 ENCOUNTER — APPOINTMENT (OUTPATIENT)
Dept: INTERVENTIONAL RADIOLOGY/VASCULAR | Facility: HOSPITAL | Age: 84
End: 2024-03-01

## 2024-03-11 ENCOUNTER — RX RENEWAL (OUTPATIENT)
Age: 84
End: 2024-03-11

## 2024-03-14 ENCOUNTER — EMERGENCY (EMERGENCY)
Facility: HOSPITAL | Age: 84
LOS: 1 days | Discharge: HOME | End: 2024-03-14
Attending: EMERGENCY MEDICINE
Payer: MEDICARE

## 2024-03-14 VITALS
SYSTOLIC BLOOD PRESSURE: 141 MMHG | TEMPERATURE: 98 F | OXYGEN SATURATION: 97 % | RESPIRATION RATE: 17 BRPM | DIASTOLIC BLOOD PRESSURE: 64 MMHG | HEART RATE: 55 BPM

## 2024-03-14 VITALS
WEIGHT: 156.97 LBS | TEMPERATURE: 97 F | HEART RATE: 90 BPM | RESPIRATION RATE: 20 BRPM | DIASTOLIC BLOOD PRESSURE: 71 MMHG | SYSTOLIC BLOOD PRESSURE: 132 MMHG | HEIGHT: 70 IN

## 2024-03-14 DIAGNOSIS — Z98.89 OTHER SPECIFIED POSTPROCEDURAL STATES: Chronic | ICD-10-CM

## 2024-03-14 LAB
ALBUMIN SERPL ELPH-MCNC: 3.2 G/DL — LOW (ref 3.3–5)
ALP SERPL-CCNC: 46 U/L — SIGNIFICANT CHANGE UP (ref 40–120)
ALT FLD-CCNC: 9 U/L — LOW (ref 10–45)
ANION GAP SERPL CALC-SCNC: 20 MMOL/L — HIGH (ref 5–17)
ANISOCYTOSIS BLD QL: SLIGHT — SIGNIFICANT CHANGE UP
AST SERPL-CCNC: 18 U/L — SIGNIFICANT CHANGE UP (ref 10–40)
BASOPHILS # BLD AUTO: 0 K/UL — SIGNIFICANT CHANGE UP (ref 0–0.2)
BASOPHILS NFR BLD AUTO: 0 % — SIGNIFICANT CHANGE UP (ref 0–2)
BILIRUB SERPL-MCNC: 0.2 MG/DL — SIGNIFICANT CHANGE UP (ref 0.2–1.2)
BUN SERPL-MCNC: 123 MG/DL — HIGH (ref 7–23)
CALCIUM SERPL-MCNC: 8.3 MG/DL — LOW (ref 8.4–10.5)
CHLORIDE SERPL-SCNC: 91 MMOL/L — LOW (ref 96–108)
CO2 SERPL-SCNC: 26 MMOL/L — SIGNIFICANT CHANGE UP (ref 22–31)
CREAT SERPL-MCNC: 14.98 MG/DL — HIGH (ref 0.5–1.3)
EGFR: 3 ML/MIN/1.73M2 — LOW
EOSINOPHIL # BLD AUTO: 0.07 K/UL — SIGNIFICANT CHANGE UP (ref 0–0.5)
EOSINOPHIL NFR BLD AUTO: 1.7 % — SIGNIFICANT CHANGE UP (ref 0–6)
GLUCOSE SERPL-MCNC: 113 MG/DL — HIGH (ref 70–99)
HCT VFR BLD CALC: 20.2 % — CRITICAL LOW (ref 39–50)
HGB BLD-MCNC: 6.5 G/DL — CRITICAL LOW (ref 13–17)
LYMPHOCYTES # BLD AUTO: 0.53 K/UL — LOW (ref 1–3.3)
LYMPHOCYTES # BLD AUTO: 13.1 % — SIGNIFICANT CHANGE UP (ref 13–44)
MACROCYTES BLD QL: SLIGHT — SIGNIFICANT CHANGE UP
MANUAL SMEAR VERIFICATION: SIGNIFICANT CHANGE UP
MCHC RBC-ENTMCNC: 28.6 PG — SIGNIFICANT CHANGE UP (ref 27–34)
MCHC RBC-ENTMCNC: 32.2 GM/DL — SIGNIFICANT CHANGE UP (ref 32–36)
MCV RBC AUTO: 89 FL — SIGNIFICANT CHANGE UP (ref 80–100)
MONOCYTES # BLD AUTO: 0.42 K/UL — SIGNIFICANT CHANGE UP (ref 0–0.9)
MONOCYTES NFR BLD AUTO: 10.4 % — SIGNIFICANT CHANGE UP (ref 2–14)
NEUTROPHILS # BLD AUTO: 3.02 K/UL — SIGNIFICANT CHANGE UP (ref 1.8–7.4)
NEUTROPHILS NFR BLD AUTO: 74.8 % — SIGNIFICANT CHANGE UP (ref 43–77)
OVALOCYTES BLD QL SMEAR: SLIGHT — SIGNIFICANT CHANGE UP
PLAT MORPH BLD: NORMAL — SIGNIFICANT CHANGE UP
PLATELET # BLD AUTO: 103 K/UL — LOW (ref 150–400)
POIKILOCYTOSIS BLD QL AUTO: SLIGHT — SIGNIFICANT CHANGE UP
POLYCHROMASIA BLD QL SMEAR: SLIGHT — SIGNIFICANT CHANGE UP
POTASSIUM SERPL-MCNC: 5.2 MMOL/L — SIGNIFICANT CHANGE UP (ref 3.5–5.3)
POTASSIUM SERPL-SCNC: 5.2 MMOL/L — SIGNIFICANT CHANGE UP (ref 3.5–5.3)
PROT SERPL-MCNC: 6 G/DL — SIGNIFICANT CHANGE UP (ref 6–8.3)
RBC # BLD: 2.27 M/UL — LOW (ref 4.2–5.8)
RBC # FLD: 15.2 % — HIGH (ref 10.3–14.5)
RBC BLD AUTO: ABNORMAL
SODIUM SERPL-SCNC: 137 MMOL/L — SIGNIFICANT CHANGE UP (ref 135–145)
WBC # BLD: 4.04 K/UL — SIGNIFICANT CHANGE UP (ref 3.8–10.5)
WBC # FLD AUTO: 4.04 K/UL — SIGNIFICANT CHANGE UP (ref 3.8–10.5)

## 2024-03-14 PROCEDURE — 36430 TRANSFUSION BLD/BLD COMPNT: CPT

## 2024-03-14 PROCEDURE — 99291 CRITICAL CARE FIRST HOUR: CPT | Mod: GC

## 2024-03-14 PROCEDURE — 80053 COMPREHEN METABOLIC PANEL: CPT

## 2024-03-14 PROCEDURE — 86901 BLOOD TYPING SEROLOGIC RH(D): CPT

## 2024-03-14 PROCEDURE — 86923 COMPATIBILITY TEST ELECTRIC: CPT

## 2024-03-14 PROCEDURE — 86900 BLOOD TYPING SEROLOGIC ABO: CPT

## 2024-03-14 PROCEDURE — P9016: CPT

## 2024-03-14 PROCEDURE — 99291 CRITICAL CARE FIRST HOUR: CPT | Mod: 25

## 2024-03-14 PROCEDURE — 86850 RBC ANTIBODY SCREEN: CPT

## 2024-03-14 PROCEDURE — 85025 COMPLETE CBC W/AUTO DIFF WBC: CPT

## 2024-03-14 NOTE — ED PROVIDER NOTE - NS ED ROS FT
Constitutional: no fevers, +chills  HEENT: no HA, vision changes, rhinorrhea, sore throat  Cardiac: no chest pain, palpitations  Respiratory: +SOB, no cough or hemoptysis  GI: no n/v/d/c, +abd pain from distention, no bloody or dark stools  : no dysuria, frequency, or hematuria, 400cc output per day  MSK: no joint pain, neck pain or back pain  Skin: no rashes, jaundice, pruritis  Neuro: no numbness/tingling, +generalized weakness, unsteady gait  ROS otherwise neg except per hpi

## 2024-03-14 NOTE — ED PROVIDER NOTE - PARTICIPANTS
pt is DNR/DNI and does not wish to be hospitalized. Does not want any additional investigations aside from the transfusion today./Patient/Family

## 2024-03-14 NOTE — ED PROVIDER NOTE - OBJECTIVE STATEMENT
83y male in hospice for end stage kidney disease, not on HD, HTN. HLD, DM2, presents for blood transfusion. pt has been suffering from increasing sob fatigue and weakness x2 months. was scheduled for therapeutic paracentesis last week but was told they were unable to perform the procedure due to his anemia, and he did not feel well enough to come to the hospital at that time. he has been on iron infusions and epo injections. no fevers +chills. no chest pain. never had a blood transfusion before. on 3L NC at baseline with typical sat 92%. recent admissions for pna. Yes: echo with normal LVFX Continue current medications:      Yes:                  2. Possible cardiogenic shock Initial presentation during this evaluation    Review and summation of old records:     The echo shows normal LVFX No Continue current medications:     Yes:                  3. Systemic arterial hypotension Initial presentation during this evaluation The blood pressure for the lastr 36 hours has been:  Systolic (24BSX), KS , Min:63 , QEU:883    Diastolic (35RUQ), WRO:25, Min:37, Max:84          No Continue current medications:        Yes:          CONSIDERATIONS, THOUGHTS, AND PLANS:    1. Continue present medications except for changes as noted above  2. Continue to monitor rhythm  3. Further orders per clinical course. Discussed with patient and nursing.     Electronically signed by Boby Rojas MD on 10/27/18        [facility]

## 2024-03-14 NOTE — ED PROVIDER NOTE - PATIENT PORTAL LINK FT
You can access the FollowMyHealth Patient Portal offered by [facility] by registering at the following website: [email or web address]. By joining [employer]’s FollowMyHealth portal, you will also be able to view your health information using other applications (apps) compatible with our system.

## 2024-03-14 NOTE — ED PROVIDER NOTE - CLINICAL SUMMARY MEDICAL DECISION MAKING FREE TEXT BOX
elderly male presents with known anemia without source of blood loss on ROS, likely from chronic kidney disease. due to goals of care will not perform any additional evaluations at this time. slow transfusion with frequent fluid/respiratory status reassessments due to clinical evidence of overload. no active infectious symptoms at this time and no differential breath sounds on exam to cause concern for infectious etiology of pre-existing hypoxic respiratory failure. elderly male presents with known anemia without source of blood loss on ROS, likely from chronic kidney disease. due to goals of care will not perform any additional evaluations at this time. slow transfusion with frequent fluid/respiratory status reassessments due to clinical evidence of overload. no active infectious symptoms at this time and no differential breath sounds on exam to cause concern for infectious etiology of pre-existing hypoxic respiratory failure.    see attending attestation authored by me, Dc Perez MD, for further MDM details.

## 2024-03-14 NOTE — ED ADULT NURSE NOTE - NSFALLHARMRISKINTERV_ED_ALL_ED
Assistance OOB with selected safe patient handling equipment if applicable/Assistance with ambulation/Communicate risk of Fall with Harm to all staff, patient, and family/Encourage patient to sit up slowly, dangle for a short time, stand at bedside before walking/Monitor gait and stability/Orthostatic vital signs/Provide visual cue: red socks, yellow wristband, yellow gown, etc/Reinforce activity limits and safety measures with patient and family/Bed in lowest position, wheels locked, appropriate side rails in place/Call bell, personal items and telephone in reach/Instruct patient to call for assistance before getting out of bed/chair/stretcher/Non-slip footwear applied when patient is off stretcher/[place] to call system/Physically safe environment - no spills, clutter or unnecessary equipment/Purposeful Proactive Rounding/Room/bathroom lighting operational, light cord in reach

## 2024-03-14 NOTE — ED PROVIDER NOTE - PRINCIPAL DIAGNOSIS
Anemia of chronic disease Partial Purse String (Intermediate) Text: Given the location of the defect and the characteristics of the surrounding skin an intermediate purse string closure was deemed most appropriate.  Undermining was performed circumfirentially around the surgical defect.  A purse string suture was then placed and tightened. Wound tension only allowed a partial closure of the circular defect.

## 2024-03-14 NOTE — ED ADULT NURSE REASSESSMENT NOTE - NS ED NURSE REASSESS COMMENT FT1
Patient found in stretcher breathing spontaneously and unlabored on NC 4L, Patient noted to utilize 3-4L NC @ home. No signs of respiratory distress @ this time. The patient is alert and orientated x4 and responds appropriately. The patient presents with a Right Hand 20G PIV that is C/D/I and saline locked @ this time. Pt denies chest pain, palpitations, shortness of breath, headache, visual disturbances, numbness/tingling, fever, chills, diaphoresis,  nausea, vomiting, constipation, diarrhea, or urinary symptoms. Patient presents with 4+ pitting edema to the bilateral upper extremities and 3+ pitting edema to the bilateral lower extremities. Safety and comfort measures provided, bed locked and in lowest position, side rails up for safety. VS to order and Awaiting results @ this time.

## 2024-03-14 NOTE — ED ADULT NURSE NOTE - OBJECTIVE STATEMENT
6940 83 yr old BM brought to ER via ambulance on stretcher for blood transfusion. Hx through daughter. PMH Renal failure. Pt is on hospice care. Was scheduled to have paracentesis 2 wks ago but hgb/hct was too low. Pt awake and alert. No c/o pain. Abd distended. O2 NC 3lpm intact with sat 88%., increased to 4lpm NC increasing to 91%. Dr rain pt. Fall risk precautions maintained. Skin W&D. Lips and nailbeds pink.

## 2024-03-14 NOTE — ED ADULT TRIAGE NOTE - WEIGHT IN KG
Received request via: Pharmacy    Was the patient seen in the last year in this department? Yes  9/21/22  Does the patient have an active prescription (recently filled or refills available) for medication(s) requested? No    
71.2

## 2024-03-14 NOTE — ED PROVIDER NOTE - PHYSICAL EXAMINATION
General: non-toxic, NAD  HEENT: NCAT, PERRL, +conjunctival pallor   Cardiac: RRR, no murmurs, 2+ peripheral pulses  Resp: CTAB  Abdomen: distended, bowel sounds present, no ttp, no rebound or guarding. no organomegaly  Extremities: bilateral pitting peripheral edema, no calf tenderness, or leg size discrepancies  Skin: no rashes  Neuro: AAOx4, 5+motor, sensation grossly intact  Psych: mood and affect appropriate

## 2024-03-14 NOTE — ED PROVIDER NOTE - NSFOLLOWUPINSTRUCTIONS_ED_ALL_ED_FT
You were seen in the Emergency Department for low hemoglobin.     1) Advance activity as tolerated.   2) Continue all previously prescribed medications as directed.    3) Follow up with your primary care physician in 24-48 hours - take copies of your results.    4) Return to the Emergency Department for worsening or persistent symptoms, and/or ANY NEW OR CONCERNING SYMPTOMS.    your kidney function has worsened, and the results are attached to this report.     if you develop any new or concerning symptoms that you want evaluated, seek medical attention or return to the ER.

## 2024-03-14 NOTE — ED PROVIDER NOTE - PROGRESS NOTE DETAILS
Kiley Beard (Rodriguez) PGY3 pt tolerated transfusion well. no increase in O2 requirement. pt and daughter aware of worsening renal function. GOC unchanged. hospice care network has arranged for transport home. ok for dc.

## 2024-03-14 NOTE — ED PROVIDER NOTE - ATTENDING CONTRIBUTION TO CARE
82 yo male hx of ESRD, currently on home hospice, p/w increasing generalized weakness, fatigue, LAWSON, and outpatient labs concerning for worsening anemia.  denies any overt bleeding.  family @ bedside for collateral.      CBC, CMP, type + screen sent.  multiple lab abnormalities including critical anemia to 6.5 and Cr of nearly 15.  consented for blood transfusion.  GOC discussion documented, considered higher level of care but confirmed patient will remain home hospice.  will transfuse PRBCs and reassess.

## 2024-03-21 ENCOUNTER — TRANSCRIPTION ENCOUNTER (OUTPATIENT)
Age: 84
End: 2024-03-21

## 2024-03-21 ENCOUNTER — OUTPATIENT (OUTPATIENT)
Dept: OUTPATIENT SERVICES | Facility: HOSPITAL | Age: 84
LOS: 1 days | End: 2024-03-21
Payer: MEDICARE

## 2024-03-21 VITALS
TEMPERATURE: 98 F | RESPIRATION RATE: 15 BRPM | DIASTOLIC BLOOD PRESSURE: 69 MMHG | SYSTOLIC BLOOD PRESSURE: 125 MMHG | HEART RATE: 53 BPM | OXYGEN SATURATION: 98 %

## 2024-03-21 DIAGNOSIS — R18.8 OTHER ASCITES: ICD-10-CM

## 2024-03-21 DIAGNOSIS — Z98.89 OTHER SPECIFIED POSTPROCEDURAL STATES: Chronic | ICD-10-CM

## 2024-03-21 PROCEDURE — 76705 ECHO EXAM OF ABDOMEN: CPT

## 2024-03-21 PROCEDURE — 76705 ECHO EXAM OF ABDOMEN: CPT | Mod: 26

## 2024-03-21 RX ORDER — SITAGLIPTIN 50 MG/1
1 TABLET, FILM COATED ORAL
Qty: 0 | Refills: 0 | DISCHARGE

## 2024-03-21 RX ORDER — ALLOPURINOL 300 MG
50 TABLET ORAL
Qty: 0 | Refills: 0 | DISCHARGE

## 2024-03-21 NOTE — ASU DISCHARGE PLAN (ADULT/PEDIATRIC) - ASU DC SPECIAL INSTRUCTIONSFT
Paracentesis    Discharge Instructions  - You have had a paracentesis.  - You may shower in 24 hours.  - Keep the area covered and dry for the next 24 hours.  - Do not perform any heavy lifting until the site is healed.  - You may resume your normal diet.  - You may resume your normal medications however you should wait 24 hours before restarting aspirin, plavix, or blood thinners.  - It is normal to experience some pain over the site for the next few days. You may take apply ice to the area (20 minutes on, 20 minutes off) and take Tylenol for that pain. Do not take more frequently than every 6 hours and do not exceed more than 3000mg of Tylenol in a 24 hour period.    Notify your primary physician and/or Interventional Radiology IMMEDIATELY if you experience any of the following       - Fever of 100.4F or 38C       - Chills or Rigors/ Shakes       - Swelling and/or Redness in the area around the biopsy site       - Worsening Pain       - Blood soaked bandages or worsening bleeding       - Lightheadedness and/or dizziness upon standing       - Chest Pain/ Tightness       - Shortness of Breath       - Difficulty walking    If you have a problem that you believe requires IMMEDIATE attention, please go to your NEAREST Emergency Room. If you believe your problem can safely wait until you speak to a physician, please call Interventional Radiology for any concerns.    Please feel free to contact us at [phone] if any problems arise. After 6PM, Monday through Friday, on weekends and on holidays, please call [phone] and ask for the radiology resident on call to be paged. Abdominal Ultrasound performed with trace perihepatic ascites with no safe window for drainage. If fluid accumulates can call IR booking office at [phone] to schedule appointment.     Discharge Instructions    If you have a problem that you believe requires IMMEDIATE attention, please go to your NEAREST Emergency Room. If you believe your problem can safely wait until you speak to a physician, please call Interventional Radiology for any concerns.    Please feel free to contact us at [phone] if any problems arise. After 6PM, Monday through Friday, on weekends and on holidays, please call [phone] and ask for the radiology resident on call to be paged.

## 2024-03-21 NOTE — ASU DISCHARGE PLAN (ADULT/PEDIATRIC) - NURSING INSTRUCTIONS
Please feel free to contact us at [phone] if any problems arise. After 6PM, Monday through Friday, on weekends and on holidays, please call [phone] and ask for the radiology resident on call to be paged..

## 2024-03-21 NOTE — ASU PATIENT PROFILE, ADULT - FALL HARM RISK - HARM RISK INTERVENTIONS

## 2024-03-21 NOTE — H&P ADULT - HISTORY OF PRESENT ILLNESS
Interventional Radiology    HPI: 83y male in hospice for end stage kidney disease, not on HD, HTN. HLD, DM2, recent ED visit on 3/14 for anemia and received a transfusion. Paracentesis was deferred at that time in setting of anemia. Pt presents to IR today for therapeutic paracentesis.      Review of Systems:   Constitutional: No fever, weight loss, or fatigue  Respiratory: No cough, wheezing, chills or hemoptysis; No shortness of breath  Cardiovascular: No chest pain, palpitations, dizziness, or leg swelling  Gastrointestinal: No abdominal or epigastric pain. No nausea, vomiting, or hematemesis; No diarrhea or constipation. No melena or hematochezia.  Skin: No itching, burning, rashes, or lesions       Allergies: No Known Allergies    Medications (Abx/Cardiac/Anticoagulation/Blood Products)      Data:    T(C): --  HR: --  BP: --  RR: --  SpO2: --            Physical Exam  General: No acute distress, nontoxic, A&Ox3  Chest: Non labored breathing  Abdomen: distended  Skin: No rashes or lesions    RADIOLOGY & ADDITIONAL TESTS:    Imaging Reviewed      Plan: 83y Male presents for therapeutic paracentesis.     -Risks/Benefits/alternatives explained with the patient and/or healthcare proxy and witnessed informed consent obtained. DNR/DNI remain in place. Pt  in hospice care.

## 2024-03-21 NOTE — ASU DISCHARGE PLAN (ADULT/PEDIATRIC) - NS MD DC FALL RISK RISK
For information on Fall & Injury Prevention, visit: [email or web address] OR  [email or web address] OR  https://www.cdc.gov/steadi/patient.html

## 2024-06-27 ENCOUNTER — APPOINTMENT (OUTPATIENT)
Dept: CARDIOLOGY | Facility: CLINIC | Age: 84
End: 2024-06-27

## 2024-06-27 DIAGNOSIS — I10 ESSENTIAL (PRIMARY) HYPERTENSION: ICD-10-CM

## 2024-06-27 DIAGNOSIS — I08.0 RHEUMATIC DISORDERS OF BOTH MITRAL AND AORTIC VALVES: ICD-10-CM

## 2024-06-27 DIAGNOSIS — E78.5 HYPERLIPIDEMIA, UNSPECIFIED: ICD-10-CM

## 2024-08-15 NOTE — PROGRESS NOTE ADULT - PROBLEM SELECTOR PLAN 2
PRE-OP INSTRUCTIONS FOR SURGICAL PATIENTS          Our Pre-admission Testing Nurses tried and were unable to reach you today.  Please read the attached instructions if you did not listen to your voicemail.     Follow all instructions provided to you from your surgeon's office, including your ARRIVAL TIME.   Arrange for someone to drive you home and be with you for the first 24 hours after discharge.     NOTE: at this time ONLY 2 ADULTS may accompany you   One person encouraged to stay at hospital entire time if outpatient surgery    Enter the MAIN entrance located on [address] and report to the surgical desk on the LEFT side of the lobby. Please park in the parking garage or there is free  Parking available after 7am for your use.    Bring your insurance card & photo ID with you to register.  Bring your medication list with you with dose and frequency listed (including over the counter medications)  Contact your ordering physician/surgeon for medication instructions as soon as possible, especially if taking blood thinners, aspirin, heart, or diabetic medication.  Bariatric surgical patients need to call your surgeon if on diabetic medications (as some may need to be stopped 1-week preop)  A Pre-Surgical History and Physical MUST be completed WITHIN 30 DAYS OR LESS prior to your procedure by your Physician or an Urgent Care.  DO NOT EAT ANYTHING 8 hours prior to arrival for surgery.  You may have sips of WATER ONLY (up to 8 ounces) 4 hours prior to your arrival for surgery. Then nothing further 4 hours prior to arriving at hospital.   NOTE: ALL Gastric, Bariatric & Bowel surgery patients - you MUST follow your surgeon's instructions regarding eating/drinking as you will have very specific instructions to follow.  If you did not receive these, call your surgeon's office immediately.   No gum, candy, mints, or ice chips day of procedure.   Please refrain from drinking alcohol the day before or day of your  procedure.   Do not use any recreational marijuana at least 24 hours or street drugs (heroin, cocaine) at minimum 5 days prior to your procedure.   Please do not smoke the day of your procedure.    Dress in loose, comfortable clothing appropriate for redressing after your procedure.   Do not wear jewelry (including body piercings), make-up, fingernail polish, lotion, powders, or metal hairclips.   Contacts, glasses, dentures, and hearing aids will need to be removed prior to surgery.  Bring your case(s) to protect them while you are in surgery.    If you use a CPAP, please bring it with you on the day of your procedure.  If you use oxygen at home, please bring your oxygen tank with you to hospital.  Do not shave or wax for 72 hours prior to procedure near your operative site.  Leave all other valuables at home.  IF there is a change in your physical condition for some reason, such as: a cold, fever, rash, cuts, sores, or any other infection, especially near your surgical site, contact your surgeon's office immediately.  On the day of surgery, take your Gabapentin with a sip of water. Stop all vitamins and aspirin for 7 days /rd     Instructions left on patient's voicemail.  James Fierro RN.8/15/2024 .9:29 AM   Pt with anemia in the setting of advanced CKD. Pt Hgb normally ~10. Hgb low today at 7.1. Pt received Aranesp on 9/18 in outpatient setting. Iron studies reviewed with low iron levels and low Tsat. Ferritin however elevated at 799. Transfusion as per primary team. Received weekly EPO on 11/7. Monitor Hgb levels. Oriented - self; Oriented - place; Oriented - time